# Patient Record
Sex: FEMALE | Race: BLACK OR AFRICAN AMERICAN | NOT HISPANIC OR LATINO | URBAN - METROPOLITAN AREA
[De-identification: names, ages, dates, MRNs, and addresses within clinical notes are randomized per-mention and may not be internally consistent; named-entity substitution may affect disease eponyms.]

---

## 2017-01-06 ENCOUNTER — OUTPATIENT (OUTPATIENT)
Dept: OUTPATIENT SERVICES | Facility: HOSPITAL | Age: 67
LOS: 1 days | End: 2017-01-06
Payer: COMMERCIAL

## 2017-01-06 ENCOUNTER — APPOINTMENT (OUTPATIENT)
Dept: ULTRASOUND IMAGING | Facility: CLINIC | Age: 67
End: 2017-01-06

## 2017-01-06 DIAGNOSIS — Z00.8 ENCOUNTER FOR OTHER GENERAL EXAMINATION: ICD-10-CM

## 2017-01-06 PROCEDURE — 76642 ULTRASOUND BREAST LIMITED: CPT

## 2017-12-05 ENCOUNTER — APPOINTMENT (OUTPATIENT)
Dept: ULTRASOUND IMAGING | Facility: CLINIC | Age: 67
End: 2017-12-05

## 2017-12-27 ENCOUNTER — OUTPATIENT (OUTPATIENT)
Dept: OUTPATIENT SERVICES | Facility: HOSPITAL | Age: 67
LOS: 1 days | End: 2017-12-27
Payer: MEDICARE

## 2017-12-27 ENCOUNTER — APPOINTMENT (OUTPATIENT)
Dept: MAMMOGRAPHY | Facility: CLINIC | Age: 67
End: 2017-12-27
Payer: MEDICARE

## 2017-12-27 DIAGNOSIS — Z00.8 ENCOUNTER FOR OTHER GENERAL EXAMINATION: ICD-10-CM

## 2017-12-27 PROCEDURE — G0202: CPT | Mod: 26

## 2017-12-27 PROCEDURE — 77063 BREAST TOMOSYNTHESIS BI: CPT

## 2017-12-27 PROCEDURE — 77063 BREAST TOMOSYNTHESIS BI: CPT | Mod: 26

## 2017-12-27 PROCEDURE — 77067 SCR MAMMO BI INCL CAD: CPT

## 2018-01-10 ENCOUNTER — APPOINTMENT (OUTPATIENT)
Dept: ULTRASOUND IMAGING | Facility: CLINIC | Age: 68
End: 2018-01-10
Payer: MEDICARE

## 2018-01-24 ENCOUNTER — OUTPATIENT (OUTPATIENT)
Dept: OUTPATIENT SERVICES | Facility: HOSPITAL | Age: 68
LOS: 1 days | End: 2018-01-24
Payer: MEDICARE

## 2018-01-24 ENCOUNTER — APPOINTMENT (OUTPATIENT)
Dept: ULTRASOUND IMAGING | Facility: CLINIC | Age: 68
End: 2018-01-24

## 2018-01-24 DIAGNOSIS — Z00.8 ENCOUNTER FOR OTHER GENERAL EXAMINATION: ICD-10-CM

## 2018-01-24 PROCEDURE — 76642 ULTRASOUND BREAST LIMITED: CPT | Mod: 26,RT

## 2018-01-24 PROCEDURE — 76642 ULTRASOUND BREAST LIMITED: CPT

## 2018-02-05 ENCOUNTER — RESULT REVIEW (OUTPATIENT)
Age: 68
End: 2018-02-05

## 2018-02-05 ENCOUNTER — OUTPATIENT (OUTPATIENT)
Dept: OUTPATIENT SERVICES | Facility: HOSPITAL | Age: 68
LOS: 1 days | End: 2018-02-05
Payer: MEDICARE

## 2018-02-05 ENCOUNTER — APPOINTMENT (OUTPATIENT)
Dept: ULTRASOUND IMAGING | Facility: CLINIC | Age: 68
End: 2018-02-05
Payer: MEDICARE

## 2018-02-05 DIAGNOSIS — Z00.8 ENCOUNTER FOR OTHER GENERAL EXAMINATION: ICD-10-CM

## 2018-02-05 PROCEDURE — 77065 DX MAMMO INCL CAD UNI: CPT

## 2018-02-05 PROCEDURE — 19083 BX BREAST 1ST LESION US IMAG: CPT | Mod: RT

## 2018-02-05 PROCEDURE — 19083 BX BREAST 1ST LESION US IMAG: CPT

## 2018-02-05 PROCEDURE — 77065 DX MAMMO INCL CAD UNI: CPT | Mod: 26,RT

## 2018-02-05 PROCEDURE — A4648: CPT

## 2018-03-05 ENCOUNTER — APPOINTMENT (OUTPATIENT)
Dept: SURGICAL ONCOLOGY | Facility: CLINIC | Age: 68
End: 2018-03-05
Payer: MEDICARE

## 2018-03-05 VITALS
BODY MASS INDEX: 29.89 KG/M2 | WEIGHT: 186 LBS | HEART RATE: 76 BPM | OXYGEN SATURATION: 98 % | DIASTOLIC BLOOD PRESSURE: 85 MMHG | HEIGHT: 66 IN | SYSTOLIC BLOOD PRESSURE: 137 MMHG

## 2018-03-05 DIAGNOSIS — Z80.3 FAMILY HISTORY OF MALIGNANT NEOPLASM OF BREAST: ICD-10-CM

## 2018-03-05 DIAGNOSIS — I34.1 NONRHEUMATIC MITRAL (VALVE) PROLAPSE: ICD-10-CM

## 2018-03-05 DIAGNOSIS — D64.9 ANEMIA, UNSPECIFIED: ICD-10-CM

## 2018-03-05 DIAGNOSIS — C50.919 MALIGNANT NEOPLASM OF UNSPECIFIED SITE OF UNSPECIFIED FEMALE BREAST: ICD-10-CM

## 2018-03-05 DIAGNOSIS — Z78.9 OTHER SPECIFIED HEALTH STATUS: ICD-10-CM

## 2018-03-05 PROCEDURE — 99205 OFFICE O/P NEW HI 60 MIN: CPT

## 2018-03-05 RX ORDER — ASPIRIN 81 MG/1
81 TABLET, DELAYED RELEASE ORAL
Refills: 0 | Status: ACTIVE | COMMUNITY

## 2019-01-01 ENCOUNTER — TRANSCRIPTION ENCOUNTER (OUTPATIENT)
Age: 69
End: 2019-01-01

## 2019-01-01 ENCOUNTER — APPOINTMENT (OUTPATIENT)
Dept: INTERVENTIONAL RADIOLOGY/VASCULAR | Facility: HOSPITAL | Age: 69
End: 2019-01-01

## 2019-01-01 ENCOUNTER — INPATIENT (INPATIENT)
Facility: HOSPITAL | Age: 69
LOS: 6 days | Discharge: HOME HEALTH | DRG: 981 | End: 2019-09-18
Attending: INTERNAL MEDICINE | Admitting: INTERNAL MEDICINE
Payer: MEDICARE

## 2019-01-01 VITALS
DIASTOLIC BLOOD PRESSURE: 61 MMHG | SYSTOLIC BLOOD PRESSURE: 112 MMHG | HEIGHT: 66 IN | WEIGHT: 139.99 LBS | TEMPERATURE: 98 F | HEART RATE: 94 BPM | RESPIRATION RATE: 16 BRPM | OXYGEN SATURATION: 100 %

## 2019-01-01 VITALS
HEART RATE: 67 BPM | OXYGEN SATURATION: 97 % | TEMPERATURE: 98 F | DIASTOLIC BLOOD PRESSURE: 52 MMHG | SYSTOLIC BLOOD PRESSURE: 93 MMHG | RESPIRATION RATE: 18 BRPM

## 2019-01-01 DIAGNOSIS — D64.9 ANEMIA, UNSPECIFIED: ICD-10-CM

## 2019-01-01 DIAGNOSIS — C50.911 MALIGNANT NEOPLASM OF UNSPECIFIED SITE OF RIGHT FEMALE BREAST: ICD-10-CM

## 2019-01-01 DIAGNOSIS — R19.5 OTHER FECAL ABNORMALITIES: ICD-10-CM

## 2019-01-01 DIAGNOSIS — A41.9 SEPSIS, UNSPECIFIED ORGANISM: ICD-10-CM

## 2019-01-01 DIAGNOSIS — C50.919 MALIGNANT NEOPLASM OF UNSPECIFIED SITE OF UNSPECIFIED FEMALE BREAST: ICD-10-CM

## 2019-01-01 DIAGNOSIS — Z98.890 OTHER SPECIFIED POSTPROCEDURAL STATES: Chronic | ICD-10-CM

## 2019-01-01 DIAGNOSIS — K92.2 GASTROINTESTINAL HEMORRHAGE, UNSPECIFIED: ICD-10-CM

## 2019-01-01 DIAGNOSIS — N64.1 FAT NECROSIS OF BREAST: ICD-10-CM

## 2019-01-01 DIAGNOSIS — I34.1 NONRHEUMATIC MITRAL (VALVE) PROLAPSE: ICD-10-CM

## 2019-01-01 DIAGNOSIS — D63.0 ANEMIA IN NEOPLASTIC DISEASE: ICD-10-CM

## 2019-01-01 DIAGNOSIS — D47.3 ESSENTIAL (HEMORRHAGIC) THROMBOCYTHEMIA: ICD-10-CM

## 2019-01-01 DIAGNOSIS — D50.0 IRON DEFICIENCY ANEMIA SECONDARY TO BLOOD LOSS (CHRONIC): ICD-10-CM

## 2019-01-01 DIAGNOSIS — Z91.89 OTHER SPECIFIED PERSONAL RISK FACTORS, NOT ELSEWHERE CLASSIFIED: ICD-10-CM

## 2019-01-01 DIAGNOSIS — E44.0 MODERATE PROTEIN-CALORIE MALNUTRITION: ICD-10-CM

## 2019-01-01 DIAGNOSIS — Z98.890 OTHER SPECIFIED POSTPROCEDURAL STATES: ICD-10-CM

## 2019-01-01 DIAGNOSIS — R63.8 OTHER SYMPTOMS AND SIGNS CONCERNING FOOD AND FLUID INTAKE: ICD-10-CM

## 2019-01-01 LAB
-  AMPICILLIN/SULBACTAM: SIGNIFICANT CHANGE UP
-  AMPICILLIN: SIGNIFICANT CHANGE UP
-  AZTREONAM: SIGNIFICANT CHANGE UP
-  CEFAZOLIN: SIGNIFICANT CHANGE UP
-  CEFAZOLIN: SIGNIFICANT CHANGE UP
-  CEFEPIME: SIGNIFICANT CHANGE UP
-  CEFTRIAXONE: SIGNIFICANT CHANGE UP
-  CIPROFLOXACIN: SIGNIFICANT CHANGE UP
-  CLINDAMYCIN: SIGNIFICANT CHANGE UP
-  ERYTHROMYCIN: SIGNIFICANT CHANGE UP
-  GENTAMICIN: SIGNIFICANT CHANGE UP
-  LEVOFLOXACIN: SIGNIFICANT CHANGE UP
-  LINEZOLID: SIGNIFICANT CHANGE UP
-  OXACILLIN: SIGNIFICANT CHANGE UP
-  PENICILLIN: SIGNIFICANT CHANGE UP
-  PIPERACILLIN/TAZOBACTAM: SIGNIFICANT CHANGE UP
-  PIPERACILLIN/TAZOBACTAM: SIGNIFICANT CHANGE UP
-  RIFAMPIN: SIGNIFICANT CHANGE UP
-  TOBRAMYCIN: SIGNIFICANT CHANGE UP
-  TRIMETHOPRIM/SULFAMETHOXAZOLE: SIGNIFICANT CHANGE UP
-  TRIMETHOPRIM/SULFAMETHOXAZOLE: SIGNIFICANT CHANGE UP
-  VANCOMYCIN: SIGNIFICANT CHANGE UP
ALBUMIN SERPL ELPH-MCNC: 2.7 G/DL — LOW (ref 3.3–5)
ALBUMIN SERPL ELPH-MCNC: 3 G/DL — LOW (ref 3.3–5)
ALP SERPL-CCNC: 67 U/L — SIGNIFICANT CHANGE UP (ref 40–120)
ALP SERPL-CCNC: 83 U/L — SIGNIFICANT CHANGE UP (ref 40–120)
ALT FLD-CCNC: 8 U/L — LOW (ref 10–45)
ALT FLD-CCNC: 9 U/L — LOW (ref 10–45)
ANION GAP SERPL CALC-SCNC: 10 MMOL/L — SIGNIFICANT CHANGE UP (ref 5–17)
ANION GAP SERPL CALC-SCNC: 10 MMOL/L — SIGNIFICANT CHANGE UP (ref 5–17)
ANION GAP SERPL CALC-SCNC: 11 MMOL/L — SIGNIFICANT CHANGE UP (ref 5–17)
ANION GAP SERPL CALC-SCNC: 7 MMOL/L — SIGNIFICANT CHANGE UP (ref 5–17)
ANION GAP SERPL CALC-SCNC: 8 MMOL/L — SIGNIFICANT CHANGE UP (ref 5–17)
ANION GAP SERPL CALC-SCNC: 9 MMOL/L — SIGNIFICANT CHANGE UP (ref 5–17)
ANISOCYTOSIS BLD QL: SIGNIFICANT CHANGE UP
APPEARANCE UR: CLEAR — SIGNIFICANT CHANGE UP
APTT BLD: 31.1 SEC — SIGNIFICANT CHANGE UP (ref 27.5–36.3)
APTT BLD: 31.5 SEC — SIGNIFICANT CHANGE UP (ref 27.5–36.3)
AST SERPL-CCNC: 24 U/L — SIGNIFICANT CHANGE UP (ref 10–40)
AST SERPL-CCNC: 26 U/L — SIGNIFICANT CHANGE UP (ref 10–40)
BASOPHILS # BLD AUTO: 0.02 K/UL — SIGNIFICANT CHANGE UP (ref 0–0.2)
BASOPHILS # BLD AUTO: 0.06 K/UL — SIGNIFICANT CHANGE UP (ref 0–0.2)
BASOPHILS # BLD AUTO: 0.08 K/UL — SIGNIFICANT CHANGE UP (ref 0–0.2)
BASOPHILS NFR BLD AUTO: 0.2 % — SIGNIFICANT CHANGE UP (ref 0–2)
BASOPHILS NFR BLD AUTO: 0.5 % — SIGNIFICANT CHANGE UP (ref 0–2)
BASOPHILS NFR BLD AUTO: 0.7 % — SIGNIFICANT CHANGE UP (ref 0–2)
BILIRUB SERPL-MCNC: 0.2 MG/DL — SIGNIFICANT CHANGE UP (ref 0.2–1.2)
BILIRUB SERPL-MCNC: 0.2 MG/DL — SIGNIFICANT CHANGE UP (ref 0.2–1.2)
BILIRUB UR-MCNC: NEGATIVE — SIGNIFICANT CHANGE UP
BLD GP AB SCN SERPL QL: NEGATIVE — SIGNIFICANT CHANGE UP
BUN SERPL-MCNC: 10 MG/DL — SIGNIFICANT CHANGE UP (ref 7–23)
BUN SERPL-MCNC: 10 MG/DL — SIGNIFICANT CHANGE UP (ref 7–23)
BUN SERPL-MCNC: 11 MG/DL — SIGNIFICANT CHANGE UP (ref 7–23)
BUN SERPL-MCNC: 9 MG/DL — SIGNIFICANT CHANGE UP (ref 7–23)
BURR CELLS BLD QL SMEAR: PRESENT — SIGNIFICANT CHANGE UP
BURR CELLS BLD QL SMEAR: SLIGHT — SIGNIFICANT CHANGE UP
CALCIUM SERPL-MCNC: 8.1 MG/DL — LOW (ref 8.4–10.5)
CALCIUM SERPL-MCNC: 8.3 MG/DL — LOW (ref 8.4–10.5)
CALCIUM SERPL-MCNC: 8.4 MG/DL — SIGNIFICANT CHANGE UP (ref 8.4–10.5)
CALCIUM SERPL-MCNC: 8.7 MG/DL — SIGNIFICANT CHANGE UP (ref 8.4–10.5)
CALCIUM SERPL-MCNC: 8.8 MG/DL — SIGNIFICANT CHANGE UP (ref 8.4–10.5)
CALCIUM SERPL-MCNC: 8.8 MG/DL — SIGNIFICANT CHANGE UP (ref 8.4–10.5)
CANCER AG27-29 SERPL-ACNC: 13 U/ML — SIGNIFICANT CHANGE UP (ref 0–38.6)
CEA SERPL-MCNC: 2.4 NG/ML — SIGNIFICANT CHANGE UP (ref 0–3.8)
CHLORIDE SERPL-SCNC: 104 MMOL/L — SIGNIFICANT CHANGE UP (ref 96–108)
CHLORIDE SERPL-SCNC: 105 MMOL/L — SIGNIFICANT CHANGE UP (ref 96–108)
CHLORIDE SERPL-SCNC: 106 MMOL/L — SIGNIFICANT CHANGE UP (ref 96–108)
CHLORIDE SERPL-SCNC: 106 MMOL/L — SIGNIFICANT CHANGE UP (ref 96–108)
CHLORIDE SERPL-SCNC: 107 MMOL/L — SIGNIFICANT CHANGE UP (ref 96–108)
CHLORIDE SERPL-SCNC: 107 MMOL/L — SIGNIFICANT CHANGE UP (ref 96–108)
CHLORIDE SERPL-SCNC: 108 MMOL/L — SIGNIFICANT CHANGE UP (ref 96–108)
CHLORIDE SERPL-SCNC: 108 MMOL/L — SIGNIFICANT CHANGE UP (ref 96–108)
CO2 SERPL-SCNC: 21 MMOL/L — LOW (ref 22–31)
CO2 SERPL-SCNC: 22 MMOL/L — SIGNIFICANT CHANGE UP (ref 22–31)
CO2 SERPL-SCNC: 22 MMOL/L — SIGNIFICANT CHANGE UP (ref 22–31)
CO2 SERPL-SCNC: 23 MMOL/L — SIGNIFICANT CHANGE UP (ref 22–31)
CO2 SERPL-SCNC: 24 MMOL/L — SIGNIFICANT CHANGE UP (ref 22–31)
COLOR SPEC: YELLOW — SIGNIFICANT CHANGE UP
CREAT SERPL-MCNC: 0.83 MG/DL — SIGNIFICANT CHANGE UP (ref 0.5–1.3)
CREAT SERPL-MCNC: 0.86 MG/DL — SIGNIFICANT CHANGE UP (ref 0.5–1.3)
CREAT SERPL-MCNC: 0.88 MG/DL — SIGNIFICANT CHANGE UP (ref 0.5–1.3)
CREAT SERPL-MCNC: 0.88 MG/DL — SIGNIFICANT CHANGE UP (ref 0.5–1.3)
CREAT SERPL-MCNC: 0.89 MG/DL — SIGNIFICANT CHANGE UP (ref 0.5–1.3)
CREAT SERPL-MCNC: 0.91 MG/DL — SIGNIFICANT CHANGE UP (ref 0.5–1.3)
CREAT SERPL-MCNC: 0.91 MG/DL — SIGNIFICANT CHANGE UP (ref 0.5–1.3)
CREAT SERPL-MCNC: 0.97 MG/DL — SIGNIFICANT CHANGE UP (ref 0.5–1.3)
CULTURE RESULTS: NO GROWTH — SIGNIFICANT CHANGE UP
CULTURE RESULTS: SIGNIFICANT CHANGE UP
DACRYOCYTES BLD QL SMEAR: SLIGHT — SIGNIFICANT CHANGE UP
DIFF PNL FLD: NEGATIVE — SIGNIFICANT CHANGE UP
EOSINOPHIL # BLD AUTO: 0.32 K/UL — SIGNIFICANT CHANGE UP (ref 0–0.5)
EOSINOPHIL # BLD AUTO: 0.39 K/UL — SIGNIFICANT CHANGE UP (ref 0–0.5)
EOSINOPHIL # BLD AUTO: 0.67 K/UL — HIGH (ref 0–0.5)
EOSINOPHIL NFR BLD AUTO: 2.9 % — SIGNIFICANT CHANGE UP (ref 0–6)
EOSINOPHIL NFR BLD AUTO: 3 % — SIGNIFICANT CHANGE UP (ref 0–6)
EOSINOPHIL NFR BLD AUTO: 6 % — SIGNIFICANT CHANGE UP (ref 0–6)
FERRITIN SERPL-MCNC: 67 NG/ML — SIGNIFICANT CHANGE UP (ref 15–150)
FOLATE SERPL-MCNC: 14.7 NG/ML — SIGNIFICANT CHANGE UP
GLUCOSE BLDC GLUCOMTR-MCNC: 107 MG/DL — HIGH (ref 70–99)
GLUCOSE SERPL-MCNC: 100 MG/DL — HIGH (ref 70–99)
GLUCOSE SERPL-MCNC: 103 MG/DL — HIGH (ref 70–99)
GLUCOSE SERPL-MCNC: 105 MG/DL — HIGH (ref 70–99)
GLUCOSE SERPL-MCNC: 105 MG/DL — HIGH (ref 70–99)
GLUCOSE SERPL-MCNC: 112 MG/DL — HIGH (ref 70–99)
GLUCOSE SERPL-MCNC: 112 MG/DL — HIGH (ref 70–99)
GLUCOSE SERPL-MCNC: 91 MG/DL — SIGNIFICANT CHANGE UP (ref 70–99)
GLUCOSE SERPL-MCNC: 95 MG/DL — SIGNIFICANT CHANGE UP (ref 70–99)
GLUCOSE UR QL: NEGATIVE — SIGNIFICANT CHANGE UP
GRAM STN FLD: SIGNIFICANT CHANGE UP
HAPTOGLOB SERPL-MCNC: 248 MG/DL — HIGH (ref 34–200)
HCT VFR BLD CALC: 12 % — CRITICAL LOW (ref 34.5–45)
HCT VFR BLD CALC: 18.2 % — CRITICAL LOW (ref 34.5–45)
HCT VFR BLD CALC: 18.5 % — CRITICAL LOW (ref 34.5–45)
HCT VFR BLD CALC: 20.8 % — CRITICAL LOW (ref 34.5–45)
HCT VFR BLD CALC: 23.3 % — LOW (ref 34.5–45)
HCT VFR BLD CALC: 23.6 % — LOW (ref 34.5–45)
HCT VFR BLD CALC: 24.8 % — LOW (ref 34.5–45)
HCT VFR BLD CALC: 25 % — LOW (ref 34.5–45)
HCT VFR BLD CALC: 25.6 % — LOW (ref 34.5–45)
HCT VFR BLD CALC: 25.7 % — LOW (ref 34.5–45)
HCT VFR BLD CALC: 25.9 % — LOW (ref 34.5–45)
HCV AB S/CO SERPL IA: 0.07 S/CO — SIGNIFICANT CHANGE UP
HCV AB SERPL-IMP: SIGNIFICANT CHANGE UP
HGB BLD-MCNC: 3.6 G/DL — CRITICAL LOW (ref 11.5–15.5)
HGB BLD-MCNC: 5.6 G/DL — CRITICAL LOW (ref 11.5–15.5)
HGB BLD-MCNC: 5.8 G/DL — CRITICAL LOW (ref 11.5–15.5)
HGB BLD-MCNC: 6.3 G/DL — CRITICAL LOW (ref 11.5–15.5)
HGB BLD-MCNC: 7.2 G/DL — LOW (ref 11.5–15.5)
HGB BLD-MCNC: 7.4 G/DL — LOW (ref 11.5–15.5)
HGB BLD-MCNC: 7.6 G/DL — LOW (ref 11.5–15.5)
HGB BLD-MCNC: 7.8 G/DL — LOW (ref 11.5–15.5)
HGB BLD-MCNC: 7.8 G/DL — LOW (ref 11.5–15.5)
HGB BLD-MCNC: 7.9 G/DL — LOW (ref 11.5–15.5)
HGB BLD-MCNC: 8 G/DL — LOW (ref 11.5–15.5)
HYPOCHROMIA BLD QL: SIGNIFICANT CHANGE UP
IMM GRANULOCYTES NFR BLD AUTO: 0.3 % — SIGNIFICANT CHANGE UP (ref 0–1.5)
IMM GRANULOCYTES NFR BLD AUTO: 0.5 % — SIGNIFICANT CHANGE UP (ref 0–1.5)
IMM GRANULOCYTES NFR BLD AUTO: 0.5 % — SIGNIFICANT CHANGE UP (ref 0–1.5)
INR BLD: 1.19 — HIGH (ref 0.88–1.16)
INR BLD: 1.22 — HIGH (ref 0.88–1.16)
IRON SATN MFR SERPL: 19 UG/DL — LOW (ref 30–160)
IRON SATN MFR SERPL: 8 % — LOW (ref 14–50)
KETONES UR-MCNC: NEGATIVE — SIGNIFICANT CHANGE UP
LDH SERPL L TO P-CCNC: 548 U/L — HIGH (ref 50–242)
LEUKOCYTE ESTERASE UR-ACNC: NEGATIVE — SIGNIFICANT CHANGE UP
LYMPHOCYTES # BLD AUTO: 1.65 K/UL — SIGNIFICANT CHANGE UP (ref 1–3.3)
LYMPHOCYTES # BLD AUTO: 1.72 K/UL — SIGNIFICANT CHANGE UP (ref 1–3.3)
LYMPHOCYTES # BLD AUTO: 1.74 K/UL — SIGNIFICANT CHANGE UP (ref 1–3.3)
LYMPHOCYTES # BLD AUTO: 13.1 % — SIGNIFICANT CHANGE UP (ref 13–44)
LYMPHOCYTES # BLD AUTO: 14.9 % — SIGNIFICANT CHANGE UP (ref 13–44)
LYMPHOCYTES # BLD AUTO: 15.8 % — SIGNIFICANT CHANGE UP (ref 13–44)
MACROCYTES BLD QL: SLIGHT — SIGNIFICANT CHANGE UP
MAGNESIUM SERPL-MCNC: 1.9 MG/DL — SIGNIFICANT CHANGE UP (ref 1.6–2.6)
MAGNESIUM SERPL-MCNC: 2 MG/DL — SIGNIFICANT CHANGE UP (ref 1.6–2.6)
MAGNESIUM SERPL-MCNC: 2 MG/DL — SIGNIFICANT CHANGE UP (ref 1.6–2.6)
MAGNESIUM SERPL-MCNC: 2.1 MG/DL — SIGNIFICANT CHANGE UP (ref 1.6–2.6)
MANUAL SMEAR VERIFICATION: SIGNIFICANT CHANGE UP
MCHC RBC-ENTMCNC: 19.4 PG — LOW (ref 27–34)
MCHC RBC-ENTMCNC: 23 PG — LOW (ref 27–34)
MCHC RBC-ENTMCNC: 23.1 PG — LOW (ref 27–34)
MCHC RBC-ENTMCNC: 23.4 PG — LOW (ref 27–34)
MCHC RBC-ENTMCNC: 24.1 PG — LOW (ref 27–34)
MCHC RBC-ENTMCNC: 24.2 PG — LOW (ref 27–34)
MCHC RBC-ENTMCNC: 24.5 PG — LOW (ref 27–34)
MCHC RBC-ENTMCNC: 24.5 PG — LOW (ref 27–34)
MCHC RBC-ENTMCNC: 24.8 PG — LOW (ref 27–34)
MCHC RBC-ENTMCNC: 24.8 PG — LOW (ref 27–34)
MCHC RBC-ENTMCNC: 24.9 PG — LOW (ref 27–34)
MCHC RBC-ENTMCNC: 29.6 GM/DL — LOW (ref 32–36)
MCHC RBC-ENTMCNC: 30 GM/DL — LOW (ref 32–36)
MCHC RBC-ENTMCNC: 30.3 GM/DL — LOW (ref 32–36)
MCHC RBC-ENTMCNC: 30.3 GM/DL — LOW (ref 32–36)
MCHC RBC-ENTMCNC: 30.5 GM/DL — LOW (ref 32–36)
MCHC RBC-ENTMCNC: 30.5 GM/DL — LOW (ref 32–36)
MCHC RBC-ENTMCNC: 30.9 GM/DL — LOW (ref 32–36)
MCHC RBC-ENTMCNC: 31.2 GM/DL — LOW (ref 32–36)
MCHC RBC-ENTMCNC: 31.8 GM/DL — LOW (ref 32–36)
MCHC RBC-ENTMCNC: 31.9 GM/DL — LOW (ref 32–36)
MCHC RBC-ENTMCNC: 31.9 GM/DL — LOW (ref 32–36)
MCV RBC AUTO: 64.5 FL — LOW (ref 80–100)
MCV RBC AUTO: 73.4 FL — LOW (ref 80–100)
MCV RBC AUTO: 75.9 FL — LOW (ref 80–100)
MCV RBC AUTO: 76.4 FL — LOW (ref 80–100)
MCV RBC AUTO: 78 FL — LOW (ref 80–100)
MCV RBC AUTO: 78.5 FL — LOW (ref 80–100)
MCV RBC AUTO: 79.4 FL — LOW (ref 80–100)
MCV RBC AUTO: 79.5 FL — LOW (ref 80–100)
MCV RBC AUTO: 79.6 FL — LOW (ref 80–100)
MCV RBC AUTO: 80.3 FL — SIGNIFICANT CHANGE UP (ref 80–100)
MCV RBC AUTO: 81.3 FL — SIGNIFICANT CHANGE UP (ref 80–100)
METHOD TYPE: SIGNIFICANT CHANGE UP
MICROCYTES BLD QL: SIGNIFICANT CHANGE UP
MONOCYTES # BLD AUTO: 0.83 K/UL — SIGNIFICANT CHANGE UP (ref 0–0.9)
MONOCYTES # BLD AUTO: 1.02 K/UL — HIGH (ref 0–0.9)
MONOCYTES # BLD AUTO: 1.05 K/UL — HIGH (ref 0–0.9)
MONOCYTES NFR BLD AUTO: 6.3 % — SIGNIFICANT CHANGE UP (ref 2–14)
MONOCYTES NFR BLD AUTO: 9.2 % — SIGNIFICANT CHANGE UP (ref 2–14)
MONOCYTES NFR BLD AUTO: 9.5 % — SIGNIFICANT CHANGE UP (ref 2–14)
NEUTROPHILS # BLD AUTO: 10.09 K/UL — HIGH (ref 1.8–7.4)
NEUTROPHILS # BLD AUTO: 7.62 K/UL — HIGH (ref 1.8–7.4)
NEUTROPHILS # BLD AUTO: 7.84 K/UL — HIGH (ref 1.8–7.4)
NEUTROPHILS NFR BLD AUTO: 68.7 % — SIGNIFICANT CHANGE UP (ref 43–77)
NEUTROPHILS NFR BLD AUTO: 71.1 % — SIGNIFICANT CHANGE UP (ref 43–77)
NEUTROPHILS NFR BLD AUTO: 76.8 % — SIGNIFICANT CHANGE UP (ref 43–77)
NITRITE UR-MCNC: NEGATIVE — SIGNIFICANT CHANGE UP
NRBC # BLD: 0 /100 WBCS — SIGNIFICANT CHANGE UP (ref 0–0)
ORGANISM # SPEC MICROSCOPIC CNT: SIGNIFICANT CHANGE UP
OVALOCYTES BLD QL SMEAR: SLIGHT — SIGNIFICANT CHANGE UP
PH UR: 8 — SIGNIFICANT CHANGE UP (ref 5–8)
PLAT MORPH BLD: NORMAL — SIGNIFICANT CHANGE UP
PLATELET # BLD AUTO: 408 K/UL — HIGH (ref 150–400)
PLATELET # BLD AUTO: 414 K/UL — HIGH (ref 150–400)
PLATELET # BLD AUTO: 433 K/UL — HIGH (ref 150–400)
PLATELET # BLD AUTO: 434 K/UL — HIGH (ref 150–400)
PLATELET # BLD AUTO: 435 K/UL — HIGH (ref 150–400)
PLATELET # BLD AUTO: 439 K/UL — HIGH (ref 150–400)
PLATELET # BLD AUTO: 455 K/UL — HIGH (ref 150–400)
PLATELET # BLD AUTO: 464 K/UL — HIGH (ref 150–400)
PLATELET # BLD AUTO: 467 K/UL — HIGH (ref 150–400)
PLATELET # BLD AUTO: 475 K/UL — HIGH (ref 150–400)
PLATELET # BLD AUTO: 567 K/UL — HIGH (ref 150–400)
POLYCHROMASIA BLD QL SMEAR: SLIGHT — SIGNIFICANT CHANGE UP
POTASSIUM SERPL-MCNC: 3.7 MMOL/L — SIGNIFICANT CHANGE UP (ref 3.5–5.3)
POTASSIUM SERPL-MCNC: 3.8 MMOL/L — SIGNIFICANT CHANGE UP (ref 3.5–5.3)
POTASSIUM SERPL-MCNC: 4 MMOL/L — SIGNIFICANT CHANGE UP (ref 3.5–5.3)
POTASSIUM SERPL-MCNC: 4 MMOL/L — SIGNIFICANT CHANGE UP (ref 3.5–5.3)
POTASSIUM SERPL-MCNC: 4.1 MMOL/L — SIGNIFICANT CHANGE UP (ref 3.5–5.3)
POTASSIUM SERPL-MCNC: 4.2 MMOL/L — SIGNIFICANT CHANGE UP (ref 3.5–5.3)
POTASSIUM SERPL-MCNC: 4.2 MMOL/L — SIGNIFICANT CHANGE UP (ref 3.5–5.3)
POTASSIUM SERPL-MCNC: 4.4 MMOL/L — SIGNIFICANT CHANGE UP (ref 3.5–5.3)
POTASSIUM SERPL-SCNC: 3.7 MMOL/L — SIGNIFICANT CHANGE UP (ref 3.5–5.3)
POTASSIUM SERPL-SCNC: 3.8 MMOL/L — SIGNIFICANT CHANGE UP (ref 3.5–5.3)
POTASSIUM SERPL-SCNC: 4 MMOL/L — SIGNIFICANT CHANGE UP (ref 3.5–5.3)
POTASSIUM SERPL-SCNC: 4 MMOL/L — SIGNIFICANT CHANGE UP (ref 3.5–5.3)
POTASSIUM SERPL-SCNC: 4.1 MMOL/L — SIGNIFICANT CHANGE UP (ref 3.5–5.3)
POTASSIUM SERPL-SCNC: 4.2 MMOL/L — SIGNIFICANT CHANGE UP (ref 3.5–5.3)
POTASSIUM SERPL-SCNC: 4.2 MMOL/L — SIGNIFICANT CHANGE UP (ref 3.5–5.3)
POTASSIUM SERPL-SCNC: 4.4 MMOL/L — SIGNIFICANT CHANGE UP (ref 3.5–5.3)
PROT SERPL-MCNC: 5.9 G/DL — LOW (ref 6–8.3)
PROT SERPL-MCNC: 6.1 G/DL — SIGNIFICANT CHANGE UP (ref 6–8.3)
PROT UR-MCNC: NEGATIVE MG/DL — SIGNIFICANT CHANGE UP
PROTHROM AB SERPL-ACNC: 13.5 SEC — HIGH (ref 10–12.9)
PROTHROM AB SERPL-ACNC: 13.9 SEC — HIGH (ref 10–12.9)
RBC # BLD: 1.86 M/UL — LOW (ref 3.8–5.2)
RBC # BLD: 2.42 M/UL — LOW (ref 3.8–5.2)
RBC # BLD: 2.48 M/UL — LOW (ref 3.8–5.2)
RBC # BLD: 2.74 M/UL — LOW (ref 3.8–5.2)
RBC # BLD: 2.79 M/UL — LOW (ref 3.8–5.2)
RBC # BLD: 2.97 M/UL — LOW (ref 3.8–5.2)
RBC # BLD: 2.97 M/UL — LOW (ref 3.8–5.2)
RBC # BLD: 3.14 M/UL — LOW (ref 3.8–5.2)
RBC # BLD: 3.16 M/UL — LOW (ref 3.8–5.2)
RBC # BLD: 3.18 M/UL — LOW (ref 3.8–5.2)
RBC # BLD: 3.19 M/UL — LOW (ref 3.8–5.2)
RBC # BLD: 3.26 M/UL — LOW (ref 3.8–5.2)
RBC # FLD: 18.4 % — HIGH (ref 10.3–14.5)
RBC # FLD: 25 % — HIGH (ref 10.3–14.5)
RBC # FLD: 25 % — HIGH (ref 10.3–14.5)
RBC # FLD: 25.1 % — HIGH (ref 10.3–14.5)
RBC # FLD: 25.6 % — HIGH (ref 10.3–14.5)
RBC # FLD: 26.1 % — HIGH (ref 10.3–14.5)
RBC # FLD: SIGNIFICANT CHANGE UP (ref 10.3–14.5)
RBC BLD AUTO: ABNORMAL
RETICS #: 60.3 K/UL — SIGNIFICANT CHANGE UP (ref 25–125)
RETICS/RBC NFR: 2.2 % — SIGNIFICANT CHANGE UP (ref 0.5–2.5)
RH IG SCN BLD-IMP: POSITIVE — SIGNIFICANT CHANGE UP
SODIUM SERPL-SCNC: 135 MMOL/L — SIGNIFICANT CHANGE UP (ref 135–145)
SODIUM SERPL-SCNC: 136 MMOL/L — SIGNIFICANT CHANGE UP (ref 135–145)
SODIUM SERPL-SCNC: 138 MMOL/L — SIGNIFICANT CHANGE UP (ref 135–145)
SODIUM SERPL-SCNC: 138 MMOL/L — SIGNIFICANT CHANGE UP (ref 135–145)
SODIUM SERPL-SCNC: 139 MMOL/L — SIGNIFICANT CHANGE UP (ref 135–145)
SP GR SPEC: 1.01 — SIGNIFICANT CHANGE UP (ref 1–1.03)
SPECIMEN SOURCE: SIGNIFICANT CHANGE UP
TARGETS BLD QL SMEAR: SLIGHT — SIGNIFICANT CHANGE UP
TIBC SERPL-MCNC: 248 UG/DL — SIGNIFICANT CHANGE UP (ref 220–430)
UIBC SERPL-MCNC: 229 UG/DL — SIGNIFICANT CHANGE UP (ref 110–370)
UROBILINOGEN FLD QL: 0.2 E.U./DL — SIGNIFICANT CHANGE UP
VANCOMYCIN TROUGH SERPL-MCNC: 12.8 UG/ML — SIGNIFICANT CHANGE UP (ref 10–20)
VIT B12 SERPL-MCNC: 866 PG/ML — SIGNIFICANT CHANGE UP (ref 232–1245)
WBC # BLD: 10.04 K/UL — SIGNIFICANT CHANGE UP (ref 3.8–10.5)
WBC # BLD: 10.36 K/UL — SIGNIFICANT CHANGE UP (ref 3.8–10.5)
WBC # BLD: 10.79 K/UL — HIGH (ref 3.8–10.5)
WBC # BLD: 11.02 K/UL — HIGH (ref 3.8–10.5)
WBC # BLD: 11.09 K/UL — HIGH (ref 3.8–10.5)
WBC # BLD: 13.13 K/UL — HIGH (ref 3.8–10.5)
WBC # BLD: 13.44 K/UL — HIGH (ref 3.8–10.5)
WBC # BLD: 13.6 K/UL — HIGH (ref 3.8–10.5)
WBC # BLD: 14.29 K/UL — HIGH (ref 3.8–10.5)
WBC # BLD: 9.06 K/UL — SIGNIFICANT CHANGE UP (ref 3.8–10.5)
WBC # BLD: 9.78 K/UL — SIGNIFICANT CHANGE UP (ref 3.8–10.5)
WBC # FLD AUTO: 10.04 K/UL — SIGNIFICANT CHANGE UP (ref 3.8–10.5)
WBC # FLD AUTO: 10.36 K/UL — SIGNIFICANT CHANGE UP (ref 3.8–10.5)
WBC # FLD AUTO: 10.79 K/UL — HIGH (ref 3.8–10.5)
WBC # FLD AUTO: 11.02 K/UL — HIGH (ref 3.8–10.5)
WBC # FLD AUTO: 11.09 K/UL — HIGH (ref 3.8–10.5)
WBC # FLD AUTO: 13.13 K/UL — HIGH (ref 3.8–10.5)
WBC # FLD AUTO: 13.44 K/UL — HIGH (ref 3.8–10.5)
WBC # FLD AUTO: 13.6 K/UL — HIGH (ref 3.8–10.5)
WBC # FLD AUTO: 14.29 K/UL — HIGH (ref 3.8–10.5)
WBC # FLD AUTO: 9.06 K/UL — SIGNIFICANT CHANGE UP (ref 3.8–10.5)
WBC # FLD AUTO: 9.78 K/UL — SIGNIFICANT CHANGE UP (ref 3.8–10.5)

## 2019-01-01 PROCEDURE — 71275 CT ANGIOGRAPHY CHEST: CPT

## 2019-01-01 PROCEDURE — 80053 COMPREHEN METABOLIC PANEL: CPT

## 2019-01-01 PROCEDURE — C1889: CPT

## 2019-01-01 PROCEDURE — 86803 HEPATITIS C AB TEST: CPT

## 2019-01-01 PROCEDURE — 36245 INS CATH ABD/L-EXT ART 1ST: CPT

## 2019-01-01 PROCEDURE — 99223 1ST HOSP IP/OBS HIGH 75: CPT | Mod: GC

## 2019-01-01 PROCEDURE — 86901 BLOOD TYPING SEROLOGIC RH(D): CPT

## 2019-01-01 PROCEDURE — 93010 ELECTROCARDIOGRAM REPORT: CPT

## 2019-01-01 PROCEDURE — 75710 ARTERY X-RAYS ARM/LEG: CPT | Mod: 26

## 2019-01-01 PROCEDURE — 75774 ARTERY X-RAY EACH VESSEL: CPT | Mod: 26

## 2019-01-01 PROCEDURE — 86922 COMPATIBILITY TEST ANTIGLOB: CPT

## 2019-01-01 PROCEDURE — 71045 X-RAY EXAM CHEST 1 VIEW: CPT | Mod: 26

## 2019-01-01 PROCEDURE — 85027 COMPLETE CBC AUTOMATED: CPT

## 2019-01-01 PROCEDURE — A9552: CPT

## 2019-01-01 PROCEDURE — 75625 CONTRAST EXAM ABDOMINL AORTA: CPT | Mod: 26

## 2019-01-01 PROCEDURE — 86300 IMMUNOASSAY TUMOR CA 15-3: CPT

## 2019-01-01 PROCEDURE — 75726 ARTERY X-RAYS ABDOMEN: CPT | Mod: 26,59

## 2019-01-01 PROCEDURE — 86923 COMPATIBILITY TEST ELECTRIC: CPT

## 2019-01-01 PROCEDURE — 80048 BASIC METABOLIC PNL TOTAL CA: CPT

## 2019-01-01 PROCEDURE — 83550 IRON BINDING TEST: CPT

## 2019-01-01 PROCEDURE — 71275 CT ANGIOGRAPHY CHEST: CPT | Mod: 26

## 2019-01-01 PROCEDURE — 85045 AUTOMATED RETICULOCYTE COUNT: CPT

## 2019-01-01 PROCEDURE — 86900 BLOOD TYPING SEROLOGIC ABO: CPT

## 2019-01-01 PROCEDURE — 81003 URINALYSIS AUTO W/O SCOPE: CPT

## 2019-01-01 PROCEDURE — 83615 LACTATE (LD) (LDH) ENZYME: CPT

## 2019-01-01 PROCEDURE — 82607 VITAMIN B-12: CPT

## 2019-01-01 PROCEDURE — 82746 ASSAY OF FOLIC ACID SERUM: CPT

## 2019-01-01 PROCEDURE — 87086 URINE CULTURE/COLONY COUNT: CPT

## 2019-01-01 PROCEDURE — 83010 ASSAY OF HAPTOGLOBIN QUANT: CPT

## 2019-01-01 PROCEDURE — P9016: CPT

## 2019-01-01 PROCEDURE — 82728 ASSAY OF FERRITIN: CPT

## 2019-01-01 PROCEDURE — 97161 PT EVAL LOW COMPLEX 20 MIN: CPT

## 2019-01-01 PROCEDURE — 36216 PLACE CATHETER IN ARTERY: CPT

## 2019-01-01 PROCEDURE — 87186 SC STD MICRODIL/AGAR DIL: CPT

## 2019-01-01 PROCEDURE — 78815 PET IMAGE W/CT SKULL-THIGH: CPT | Mod: 26

## 2019-01-01 PROCEDURE — 85610 PROTHROMBIN TIME: CPT

## 2019-01-01 PROCEDURE — 87184 SC STD DISK METHOD PER PLATE: CPT

## 2019-01-01 PROCEDURE — 36415 COLL VENOUS BLD VENIPUNCTURE: CPT

## 2019-01-01 PROCEDURE — 36430 TRANSFUSION BLD/BLD COMPNT: CPT

## 2019-01-01 PROCEDURE — 84484 ASSAY OF TROPONIN QUANT: CPT

## 2019-01-01 PROCEDURE — C1894: CPT

## 2019-01-01 PROCEDURE — 86921 COMPATIBILITY TEST INCUBATE: CPT

## 2019-01-01 PROCEDURE — 85730 THROMBOPLASTIN TIME PARTIAL: CPT

## 2019-01-01 PROCEDURE — 36218 PLACE CATHETER IN ARTERY: CPT

## 2019-01-01 PROCEDURE — 99285 EMERGENCY DEPT VISIT HI MDM: CPT | Mod: 25

## 2019-01-01 PROCEDURE — 99285 EMERGENCY DEPT VISIT HI MDM: CPT

## 2019-01-01 PROCEDURE — 37243 VASC EMBOLIZE/OCCLUDE ORGAN: CPT | Mod: XS

## 2019-01-01 PROCEDURE — 83735 ASSAY OF MAGNESIUM: CPT

## 2019-01-01 PROCEDURE — 82378 CARCINOEMBRYONIC ANTIGEN: CPT

## 2019-01-01 PROCEDURE — 83540 ASSAY OF IRON: CPT

## 2019-01-01 PROCEDURE — 85025 COMPLETE CBC W/AUTO DIFF WBC: CPT

## 2019-01-01 PROCEDURE — 80202 ASSAY OF VANCOMYCIN: CPT

## 2019-01-01 PROCEDURE — 86850 RBC ANTIBODY SCREEN: CPT

## 2019-01-01 PROCEDURE — C1769: CPT

## 2019-01-01 PROCEDURE — 87070 CULTURE OTHR SPECIMN AEROBIC: CPT

## 2019-01-01 PROCEDURE — 82962 GLUCOSE BLOOD TEST: CPT

## 2019-01-01 PROCEDURE — 78815 PET IMAGE W/CT SKULL-THIGH: CPT

## 2019-01-01 PROCEDURE — 87040 BLOOD CULTURE FOR BACTERIA: CPT

## 2019-01-01 PROCEDURE — 71045 X-RAY EXAM CHEST 1 VIEW: CPT

## 2019-01-01 PROCEDURE — C1887: CPT

## 2019-01-01 PROCEDURE — 86078 PHYS BLOOD BANK SERV REACTJ: CPT

## 2019-01-01 PROCEDURE — 93005 ELECTROCARDIOGRAM TRACING: CPT

## 2019-01-01 RX ORDER — ONDANSETRON 8 MG/1
16 TABLET, FILM COATED ORAL ONCE
Refills: 0 | Status: DISCONTINUED | OUTPATIENT
Start: 2019-01-01 | End: 2019-01-01

## 2019-01-01 RX ORDER — POLYETHYLENE GLYCOL 3350 17 G/17G
17 POWDER, FOR SOLUTION ORAL DAILY
Refills: 0 | Status: DISCONTINUED | OUTPATIENT
Start: 2019-01-01 | End: 2019-01-01

## 2019-01-01 RX ORDER — ENOXAPARIN SODIUM 100 MG/ML
40 INJECTION SUBCUTANEOUS DAILY
Refills: 0 | Status: DISCONTINUED | OUTPATIENT
Start: 2019-01-01 | End: 2019-01-01

## 2019-01-01 RX ORDER — MITOXANTRONE 2 MG/ML
5 INJECTION, SOLUTION, CONCENTRATE INTRAVENOUS
Qty: 0 | Refills: 0 | DISCHARGE
Start: 2019-01-01

## 2019-01-01 RX ORDER — BACITRACIN ZINC NEOMYCIN SULFATE POLYMYXIN B SULFATE 400; 3.5; 5 [IU]/G; MG/G; [IU]/G
1 OINTMENT TOPICAL EVERY 8 HOURS
Refills: 0 | Status: DISCONTINUED | OUTPATIENT
Start: 2019-01-01 | End: 2019-01-01

## 2019-01-01 RX ORDER — GEMCITABINE 38 MG/ML
0 INJECTION, SOLUTION INTRAVENOUS
Qty: 0 | Refills: 0 | DISCHARGE
Start: 2019-01-01

## 2019-01-01 RX ORDER — SODIUM CHLORIDE 9 MG/ML
1000 INJECTION INTRAMUSCULAR; INTRAVENOUS; SUBCUTANEOUS
Refills: 0 | Status: DISCONTINUED | OUTPATIENT
Start: 2019-01-01 | End: 2019-01-01

## 2019-01-01 RX ORDER — OXALIPLATIN 5 MG/ML
0 INJECTION, SOLUTION INTRAVENOUS
Qty: 0 | Refills: 0 | DISCHARGE
Start: 2019-01-01

## 2019-01-01 RX ORDER — METRONIDAZOLE 500 MG
500 TABLET ORAL EVERY 8 HOURS
Refills: 0 | Status: DISCONTINUED | OUTPATIENT
Start: 2019-01-01 | End: 2019-01-01

## 2019-01-01 RX ORDER — ACETAMINOPHEN 500 MG
325 TABLET ORAL EVERY 4 HOURS
Refills: 0 | Status: DISCONTINUED | OUTPATIENT
Start: 2019-01-01 | End: 2019-01-01

## 2019-01-01 RX ORDER — METRONIDAZOLE 500 MG
1 TABLET ORAL
Qty: 21 | Refills: 0
Start: 2019-01-01 | End: 2019-01-01

## 2019-01-01 RX ORDER — MAGNESIUM SULFATE 500 MG/ML
1 VIAL (ML) INJECTION ONCE
Refills: 0 | Status: COMPLETED | OUTPATIENT
Start: 2019-01-01 | End: 2019-01-01

## 2019-01-01 RX ORDER — POTASSIUM CHLORIDE 20 MEQ
40 PACKET (EA) ORAL ONCE
Refills: 0 | Status: COMPLETED | OUTPATIENT
Start: 2019-01-01 | End: 2019-01-01

## 2019-01-01 RX ORDER — ACETAMINOPHEN 500 MG
650 TABLET ORAL ONCE
Refills: 0 | Status: COMPLETED | OUTPATIENT
Start: 2019-01-01 | End: 2019-01-01

## 2019-01-01 RX ORDER — PIPERACILLIN AND TAZOBACTAM 4; .5 G/20ML; G/20ML
3.38 INJECTION, POWDER, LYOPHILIZED, FOR SOLUTION INTRAVENOUS EVERY 6 HOURS
Refills: 0 | Status: DISCONTINUED | OUTPATIENT
Start: 2019-01-01 | End: 2019-01-01

## 2019-01-01 RX ORDER — DOCUSATE SODIUM 100 MG
100 CAPSULE ORAL THREE TIMES A DAY
Refills: 0 | Status: DISCONTINUED | OUTPATIENT
Start: 2019-01-01 | End: 2019-01-01

## 2019-01-01 RX ORDER — VANCOMYCIN HCL 1 G
1250 VIAL (EA) INTRAVENOUS EVERY 12 HOURS
Refills: 0 | Status: DISCONTINUED | OUTPATIENT
Start: 2019-01-01 | End: 2019-01-01

## 2019-01-01 RX ORDER — MITOXANTRONE 2 MG/ML
10 INJECTION, SOLUTION, CONCENTRATE INTRAVENOUS ONCE
Refills: 0 | Status: DISCONTINUED | OUTPATIENT
Start: 2019-01-01 | End: 2019-01-01

## 2019-01-01 RX ORDER — VANCOMYCIN HCL 1 G
1000 VIAL (EA) INTRAVENOUS EVERY 12 HOURS
Refills: 0 | Status: DISCONTINUED | OUTPATIENT
Start: 2019-01-01 | End: 2019-01-01

## 2019-01-01 RX ORDER — OXYCODONE AND ACETAMINOPHEN 5; 325 MG/1; MG/1
1 TABLET ORAL EVERY 6 HOURS
Refills: 0 | Status: DISCONTINUED | OUTPATIENT
Start: 2019-01-01 | End: 2019-01-01

## 2019-01-01 RX ORDER — IRON SUCROSE 20 MG/ML
200 INJECTION, SOLUTION INTRAVENOUS ONCE
Refills: 0 | Status: COMPLETED | OUTPATIENT
Start: 2019-01-01 | End: 2019-01-01

## 2019-01-01 RX ORDER — PIPERACILLIN AND TAZOBACTAM 4; .5 G/20ML; G/20ML
4.5 INJECTION, POWDER, LYOPHILIZED, FOR SOLUTION INTRAVENOUS EVERY 6 HOURS
Refills: 0 | Status: DISCONTINUED | OUTPATIENT
Start: 2019-01-01 | End: 2019-01-01

## 2019-01-01 RX ORDER — VANCOMYCIN HCL 1 G
VIAL (EA) INTRAVENOUS
Refills: 0 | Status: DISCONTINUED | OUTPATIENT
Start: 2019-01-01 | End: 2019-01-01

## 2019-01-01 RX ORDER — FLUOROURACIL 50 MG/ML
0 INJECTION, SOLUTION INTRAVENOUS
Qty: 0 | Refills: 0 | DISCHARGE
Start: 2019-01-01

## 2019-01-01 RX ORDER — DEXAMETHASONE 0.5 MG/5ML
16 ELIXIR ORAL ONCE
Refills: 0 | Status: DISCONTINUED | OUTPATIENT
Start: 2019-01-01 | End: 2019-01-01

## 2019-01-01 RX ORDER — BACITRACIN ZINC NEOMYCIN SULFATE POLYMYXIN B SULFATE 400; 3.5; 5 [IU]/G; MG/G; [IU]/G
1 OINTMENT TOPICAL
Qty: 0 | Refills: 0 | DISCHARGE
Start: 2019-01-01

## 2019-01-01 RX ORDER — SENNA PLUS 8.6 MG/1
1 TABLET ORAL DAILY
Refills: 0 | Status: DISCONTINUED | OUTPATIENT
Start: 2019-01-01 | End: 2019-01-01

## 2019-01-01 RX ORDER — FLUOROURACIL 50 MG/ML
1000 INJECTION, SOLUTION INTRAVENOUS ONCE
Refills: 0 | Status: DISCONTINUED | OUTPATIENT
Start: 2019-01-01 | End: 2019-01-01

## 2019-01-01 RX ORDER — OXALIPLATIN 5 MG/ML
100 INJECTION, SOLUTION INTRAVENOUS ONCE
Refills: 0 | Status: DISCONTINUED | OUTPATIENT
Start: 2019-01-01 | End: 2019-01-01

## 2019-01-01 RX ORDER — PIPERACILLIN AND TAZOBACTAM 4; .5 G/20ML; G/20ML
3.38 INJECTION, POWDER, LYOPHILIZED, FOR SOLUTION INTRAVENOUS ONCE
Refills: 0 | Status: COMPLETED | OUTPATIENT
Start: 2019-01-01 | End: 2019-01-01

## 2019-01-01 RX ORDER — GEMCITABINE 38 MG/ML
1000 INJECTION, SOLUTION INTRAVENOUS ONCE
Refills: 0 | Status: DISCONTINUED | OUTPATIENT
Start: 2019-01-01 | End: 2019-01-01

## 2019-01-01 RX ORDER — METRONIDAZOLE 500 MG
1 TABLET ORAL
Qty: 90 | Refills: 11
Start: 2019-01-01 | End: 2020-09-11

## 2019-01-01 RX ORDER — INFLUENZA VIRUS VACCINE 15; 15; 15; 15 UG/.5ML; UG/.5ML; UG/.5ML; UG/.5ML
0.5 SUSPENSION INTRAMUSCULAR ONCE
Refills: 0 | Status: DISCONTINUED | OUTPATIENT
Start: 2019-01-01 | End: 2019-01-01

## 2019-01-01 RX ORDER — PANTOPRAZOLE SODIUM 20 MG/1
40 TABLET, DELAYED RELEASE ORAL
Refills: 0 | Status: DISCONTINUED | OUTPATIENT
Start: 2019-01-01 | End: 2019-01-01

## 2019-01-01 RX ORDER — SODIUM CHLORIDE 9 MG/ML
500 INJECTION INTRAMUSCULAR; INTRAVENOUS; SUBCUTANEOUS ONCE
Refills: 0 | Status: COMPLETED | OUTPATIENT
Start: 2019-01-01 | End: 2019-01-01

## 2019-01-01 RX ADMIN — Medication 325 MILLIGRAM(S): at 19:31

## 2019-01-01 RX ADMIN — PANTOPRAZOLE SODIUM 40 MILLIGRAM(S): 20 TABLET, DELAYED RELEASE ORAL at 06:02

## 2019-01-01 RX ADMIN — PIPERACILLIN AND TAZOBACTAM 25 GRAM(S): 4; .5 INJECTION, POWDER, LYOPHILIZED, FOR SOLUTION INTRAVENOUS at 21:12

## 2019-01-01 RX ADMIN — Medication 40 MILLIEQUIVALENT(S): at 11:49

## 2019-01-01 RX ADMIN — SENNA PLUS 1 TABLET(S): 8.6 TABLET ORAL at 16:09

## 2019-01-01 RX ADMIN — PIPERACILLIN AND TAZOBACTAM 200 GRAM(S): 4; .5 INJECTION, POWDER, LYOPHILIZED, FOR SOLUTION INTRAVENOUS at 17:00

## 2019-01-01 RX ADMIN — Medication 100 GRAM(S): at 14:35

## 2019-01-01 RX ADMIN — BACITRACIN ZINC NEOMYCIN SULFATE POLYMYXIN B SULFATE 1 APPLICATION(S): 400; 3.5; 5 OINTMENT TOPICAL at 16:13

## 2019-01-01 RX ADMIN — BACITRACIN ZINC NEOMYCIN SULFATE POLYMYXIN B SULFATE 1 APPLICATION(S): 400; 3.5; 5 OINTMENT TOPICAL at 06:20

## 2019-01-01 RX ADMIN — BACITRACIN ZINC NEOMYCIN SULFATE POLYMYXIN B SULFATE 1 APPLICATION(S): 400; 3.5; 5 OINTMENT TOPICAL at 22:23

## 2019-01-01 RX ADMIN — PIPERACILLIN AND TAZOBACTAM 25 GRAM(S): 4; .5 INJECTION, POWDER, LYOPHILIZED, FOR SOLUTION INTRAVENOUS at 01:43

## 2019-01-01 RX ADMIN — Medication 100 MILLIGRAM(S): at 22:23

## 2019-01-01 RX ADMIN — BACITRACIN ZINC NEOMYCIN SULFATE POLYMYXIN B SULFATE 1 APPLICATION(S): 400; 3.5; 5 OINTMENT TOPICAL at 13:23

## 2019-01-01 RX ADMIN — Medication 100 MILLIGRAM(S): at 06:20

## 2019-01-01 RX ADMIN — Medication 325 MILLIGRAM(S): at 18:31

## 2019-01-01 RX ADMIN — Medication 650 MILLIGRAM(S): at 07:26

## 2019-01-01 RX ADMIN — Medication 100 MILLIGRAM(S): at 22:02

## 2019-01-01 RX ADMIN — Medication 650 MILLIGRAM(S): at 06:44

## 2019-01-01 RX ADMIN — Medication 250 MILLIGRAM(S): at 06:06

## 2019-01-01 RX ADMIN — PANTOPRAZOLE SODIUM 40 MILLIGRAM(S): 20 TABLET, DELAYED RELEASE ORAL at 06:00

## 2019-01-01 RX ADMIN — BACITRACIN ZINC NEOMYCIN SULFATE POLYMYXIN B SULFATE 1 APPLICATION(S): 400; 3.5; 5 OINTMENT TOPICAL at 06:08

## 2019-01-01 RX ADMIN — Medication 325 MILLIGRAM(S): at 06:27

## 2019-01-01 RX ADMIN — PIPERACILLIN AND TAZOBACTAM 200 GRAM(S): 4; .5 INJECTION, POWDER, LYOPHILIZED, FOR SOLUTION INTRAVENOUS at 05:51

## 2019-01-01 RX ADMIN — SODIUM CHLORIDE 75 MILLILITER(S): 9 INJECTION INTRAMUSCULAR; INTRAVENOUS; SUBCUTANEOUS at 13:55

## 2019-01-01 RX ADMIN — Medication 325 MILLIGRAM(S): at 12:57

## 2019-01-01 RX ADMIN — Medication 100 MILLIGRAM(S): at 09:46

## 2019-01-01 RX ADMIN — BACITRACIN ZINC NEOMYCIN SULFATE POLYMYXIN B SULFATE 1 APPLICATION(S): 400; 3.5; 5 OINTMENT TOPICAL at 13:04

## 2019-01-01 RX ADMIN — PIPERACILLIN AND TAZOBACTAM 200 GRAM(S): 4; .5 INJECTION, POWDER, LYOPHILIZED, FOR SOLUTION INTRAVENOUS at 09:47

## 2019-01-01 RX ADMIN — Medication 100 MILLIGRAM(S): at 13:21

## 2019-01-01 RX ADMIN — PANTOPRAZOLE SODIUM 40 MILLIGRAM(S): 20 TABLET, DELAYED RELEASE ORAL at 06:20

## 2019-01-01 RX ADMIN — ENOXAPARIN SODIUM 40 MILLIGRAM(S): 100 INJECTION SUBCUTANEOUS at 11:49

## 2019-01-01 RX ADMIN — SODIUM CHLORIDE 500 MILLILITER(S): 9 INJECTION INTRAMUSCULAR; INTRAVENOUS; SUBCUTANEOUS at 14:03

## 2019-01-01 RX ADMIN — BACITRACIN ZINC NEOMYCIN SULFATE POLYMYXIN B SULFATE 1 APPLICATION(S): 400; 3.5; 5 OINTMENT TOPICAL at 05:43

## 2019-01-01 RX ADMIN — Medication 100 MILLIGRAM(S): at 06:07

## 2019-01-01 RX ADMIN — BACITRACIN ZINC NEOMYCIN SULFATE POLYMYXIN B SULFATE 1 APPLICATION(S): 400; 3.5; 5 OINTMENT TOPICAL at 21:36

## 2019-01-01 RX ADMIN — BACITRACIN ZINC NEOMYCIN SULFATE POLYMYXIN B SULFATE 1 APPLICATION(S): 400; 3.5; 5 OINTMENT TOPICAL at 07:03

## 2019-01-01 RX ADMIN — OXYCODONE AND ACETAMINOPHEN 1 TABLET(S): 5; 325 TABLET ORAL at 00:53

## 2019-01-01 RX ADMIN — Medication 325 MILLIGRAM(S): at 00:17

## 2019-01-01 RX ADMIN — PANTOPRAZOLE SODIUM 40 MILLIGRAM(S): 20 TABLET, DELAYED RELEASE ORAL at 06:07

## 2019-01-01 RX ADMIN — PIPERACILLIN AND TAZOBACTAM 200 GRAM(S): 4; .5 INJECTION, POWDER, LYOPHILIZED, FOR SOLUTION INTRAVENOUS at 16:12

## 2019-01-01 RX ADMIN — BACITRACIN ZINC NEOMYCIN SULFATE POLYMYXIN B SULFATE 1 APPLICATION(S): 400; 3.5; 5 OINTMENT TOPICAL at 06:03

## 2019-01-01 RX ADMIN — PIPERACILLIN AND TAZOBACTAM 200 GRAM(S): 4; .5 INJECTION, POWDER, LYOPHILIZED, FOR SOLUTION INTRAVENOUS at 03:30

## 2019-01-01 RX ADMIN — Medication 325 MILLIGRAM(S): at 16:45

## 2019-01-01 RX ADMIN — Medication 100 MILLIGRAM(S): at 07:02

## 2019-01-01 RX ADMIN — PIPERACILLIN AND TAZOBACTAM 200 GRAM(S): 4; .5 INJECTION, POWDER, LYOPHILIZED, FOR SOLUTION INTRAVENOUS at 12:03

## 2019-01-01 RX ADMIN — OXYCODONE AND ACETAMINOPHEN 1 TABLET(S): 5; 325 TABLET ORAL at 23:53

## 2019-01-01 RX ADMIN — OXYCODONE AND ACETAMINOPHEN 1 TABLET(S): 5; 325 TABLET ORAL at 16:14

## 2019-01-01 RX ADMIN — Medication 250 MILLIGRAM(S): at 19:26

## 2019-01-01 RX ADMIN — PIPERACILLIN AND TAZOBACTAM 200 GRAM(S): 4; .5 INJECTION, POWDER, LYOPHILIZED, FOR SOLUTION INTRAVENOUS at 22:01

## 2019-01-01 RX ADMIN — Medication 325 MILLIGRAM(S): at 11:57

## 2019-01-01 RX ADMIN — Medication 250 MILLIGRAM(S): at 18:21

## 2019-01-01 RX ADMIN — Medication 250 MILLIGRAM(S): at 07:32

## 2019-01-01 RX ADMIN — SENNA PLUS 1 TABLET(S): 8.6 TABLET ORAL at 22:02

## 2019-01-01 RX ADMIN — Medication 250 MILLIGRAM(S): at 06:20

## 2019-01-01 RX ADMIN — Medication 325 MILLIGRAM(S): at 06:57

## 2019-01-01 RX ADMIN — PANTOPRAZOLE SODIUM 40 MILLIGRAM(S): 20 TABLET, DELAYED RELEASE ORAL at 06:19

## 2019-01-01 RX ADMIN — BACITRACIN ZINC NEOMYCIN SULFATE POLYMYXIN B SULFATE 1 APPLICATION(S): 400; 3.5; 5 OINTMENT TOPICAL at 23:55

## 2019-01-01 RX ADMIN — BACITRACIN ZINC NEOMYCIN SULFATE POLYMYXIN B SULFATE 1 APPLICATION(S): 400; 3.5; 5 OINTMENT TOPICAL at 21:16

## 2019-01-01 RX ADMIN — PANTOPRAZOLE SODIUM 40 MILLIGRAM(S): 20 TABLET, DELAYED RELEASE ORAL at 06:28

## 2019-01-01 RX ADMIN — Medication 100 MILLIGRAM(S): at 23:54

## 2019-01-01 RX ADMIN — Medication 166.67 MILLIGRAM(S): at 07:43

## 2019-01-01 RX ADMIN — PIPERACILLIN AND TAZOBACTAM 25 GRAM(S): 4; .5 INJECTION, POWDER, LYOPHILIZED, FOR SOLUTION INTRAVENOUS at 18:33

## 2019-01-01 RX ADMIN — PIPERACILLIN AND TAZOBACTAM 200 GRAM(S): 4; .5 INJECTION, POWDER, LYOPHILIZED, FOR SOLUTION INTRAVENOUS at 23:58

## 2019-01-01 RX ADMIN — BACITRACIN ZINC NEOMYCIN SULFATE POLYMYXIN B SULFATE 1 APPLICATION(S): 400; 3.5; 5 OINTMENT TOPICAL at 22:02

## 2019-01-01 RX ADMIN — Medication 325 MILLIGRAM(S): at 01:17

## 2019-01-01 RX ADMIN — PIPERACILLIN AND TAZOBACTAM 25 GRAM(S): 4; .5 INJECTION, POWDER, LYOPHILIZED, FOR SOLUTION INTRAVENOUS at 03:23

## 2019-01-01 RX ADMIN — PANTOPRAZOLE SODIUM 40 MILLIGRAM(S): 20 TABLET, DELAYED RELEASE ORAL at 07:02

## 2019-01-01 RX ADMIN — Medication 250 MILLIGRAM(S): at 18:01

## 2019-01-01 RX ADMIN — BACITRACIN ZINC NEOMYCIN SULFATE POLYMYXIN B SULFATE 1 APPLICATION(S): 400; 3.5; 5 OINTMENT TOPICAL at 05:59

## 2019-01-01 RX ADMIN — ENOXAPARIN SODIUM 40 MILLIGRAM(S): 100 INJECTION SUBCUTANEOUS at 14:34

## 2019-01-01 RX ADMIN — PIPERACILLIN AND TAZOBACTAM 25 GRAM(S): 4; .5 INJECTION, POWDER, LYOPHILIZED, FOR SOLUTION INTRAVENOUS at 13:21

## 2019-01-01 RX ADMIN — PIPERACILLIN AND TAZOBACTAM 25 GRAM(S): 4; .5 INJECTION, POWDER, LYOPHILIZED, FOR SOLUTION INTRAVENOUS at 06:02

## 2019-01-01 RX ADMIN — OXYCODONE AND ACETAMINOPHEN 1 TABLET(S): 5; 325 TABLET ORAL at 14:38

## 2019-01-01 RX ADMIN — POLYETHYLENE GLYCOL 3350 17 GRAM(S): 17 POWDER, FOR SOLUTION ORAL at 22:02

## 2019-01-01 RX ADMIN — PIPERACILLIN AND TAZOBACTAM 25 GRAM(S): 4; .5 INJECTION, POWDER, LYOPHILIZED, FOR SOLUTION INTRAVENOUS at 09:00

## 2019-01-01 RX ADMIN — ENOXAPARIN SODIUM 40 MILLIGRAM(S): 100 INJECTION SUBCUTANEOUS at 13:21

## 2019-01-01 RX ADMIN — Medication 250 MILLIGRAM(S): at 18:12

## 2019-01-01 RX ADMIN — BACITRACIN ZINC NEOMYCIN SULFATE POLYMYXIN B SULFATE 1 APPLICATION(S): 400; 3.5; 5 OINTMENT TOPICAL at 13:40

## 2019-01-01 RX ADMIN — Medication 100 MILLIGRAM(S): at 14:35

## 2019-01-01 RX ADMIN — Medication 166.67 MILLIGRAM(S): at 18:30

## 2019-01-01 RX ADMIN — Medication 166.67 MILLIGRAM(S): at 06:41

## 2019-01-01 RX ADMIN — Medication 100 MILLIGRAM(S): at 21:36

## 2019-01-01 RX ADMIN — ENOXAPARIN SODIUM 40 MILLIGRAM(S): 100 INJECTION SUBCUTANEOUS at 12:03

## 2019-01-01 RX ADMIN — Medication 100 MILLIGRAM(S): at 13:40

## 2019-01-01 RX ADMIN — PIPERACILLIN AND TAZOBACTAM 25 GRAM(S): 4; .5 INJECTION, POWDER, LYOPHILIZED, FOR SOLUTION INTRAVENOUS at 19:55

## 2019-01-01 RX ADMIN — Medication 250 MILLIGRAM(S): at 06:28

## 2019-01-01 RX ADMIN — PIPERACILLIN AND TAZOBACTAM 25 GRAM(S): 4; .5 INJECTION, POWDER, LYOPHILIZED, FOR SOLUTION INTRAVENOUS at 14:35

## 2019-01-01 RX ADMIN — Medication 100 MILLIGRAM(S): at 06:02

## 2019-01-01 RX ADMIN — PIPERACILLIN AND TAZOBACTAM 25 GRAM(S): 4; .5 INJECTION, POWDER, LYOPHILIZED, FOR SOLUTION INTRAVENOUS at 23:56

## 2019-01-01 RX ADMIN — IRON SUCROSE 110 MILLIGRAM(S): 20 INJECTION, SOLUTION INTRAVENOUS at 05:59

## 2019-01-01 RX ADMIN — POLYETHYLENE GLYCOL 3350 17 GRAM(S): 17 POWDER, FOR SOLUTION ORAL at 14:35

## 2019-01-01 RX ADMIN — PIPERACILLIN AND TAZOBACTAM 25 GRAM(S): 4; .5 INJECTION, POWDER, LYOPHILIZED, FOR SOLUTION INTRAVENOUS at 07:53

## 2019-05-28 ENCOUNTER — TRANSCRIPTION ENCOUNTER (OUTPATIENT)
Age: 69
End: 2019-05-28

## 2019-09-11 NOTE — H&P ADULT - PROBLEM SELECTOR PLAN 5
1) PCP Contacted on Admission: (Y/N) --> Name & Phone #: Dr. Orr [phone]  2) Date of Contact with PCP:  3) PCP Contacted at Discharge: (Y/N, N/A)  4) Summary of Handoff Given to PCP:   5) Post-Discharge Appointment Date and Location: F: none  E: replete K <4, Mg <2  N: Regular  GI Ppx: Protonix  DVT Ppx: SCDs  Code status: FULL  Dispo: [facility]

## 2019-09-11 NOTE — H&P ADULT - HISTORY OF PRESENT ILLNESS
Patient is a 67yo female with PMH breast cancer (diagnosed in 2018), anemia, MVP presenting with dyspnea and chest pain on exertion and worsening fatigue. About 1 month ago she started feeling weak and tired. 2-3 weeks ago she started getting left sided chest pain and shortness of breath upon exerting herself. Pain and sob come on after walking 10 feet. She went to an Urgent Care on Saturday 9/7 and they wanted to do an EKG but patient refused. She saw Dr. Orr yesterday who did blood tests. She will be starting treatment next week (pt unsure whether chemo/radiation). She received a call from him today that he Hgb was 4 and she needed to come into the hospital for a blood transfusion. She report lightheadedness, dizziness, pain in her mid-upper back, photophobia, nasal congestion, feeling of lump in throat, decreased appetite, 50lb weight loss since 11/2018, dysuria. She admits to having loose, watery BMs every other day for the past month. Says her BMs sometimes look dark red because she drinks a lot of beet juice. Last BM today was loose, watery and dark red. She also reports bowel incontinence. She was treated 3 weeks ago with a Z-pack for nasal congestion and throat pain. She has been taking herbal supplements, multivitamins and reports she has been trying to manage her breast cancer holistically.     In the ED vs: 97.6, 112/61, 94, 16, 100%   Labs: wbc 11, hgb 3.6, hct 12, plts 567, glu 112, Alb 3, PT 13.5, INR 1.19  Imaging: EKG nsr, CXR   Received: 1L NS bolus, 1u pRBCs, currently receiving another unit pRBCs  Patient will be [facility] for further management Patient is a 67yo female with PMH breast cancer (diagnosed in 2018), anemia, MVP presenting with dyspnea and chest pain on exertion and worsening fatigue. About 1 month ago she started feeling weak and tired. 2-3 weeks ago she started getting left sided chest pain and shortness of breath upon exerting herself. Pain and sob come on after walking 10 feet. She went to an Urgent Care on Saturday 9/7 and they wanted to do an EKG but patient refused. She saw Dr. Orr yesterday who did blood tests. She will be starting treatment next week (pt unsure whether chemo/radiation). She received a call from him today that he Hgb was 4 and she needed to come into the hospital for a blood transfusion. She report lightheadedness, dizziness, pain in her mid-upper back, photophobia, nasal congestion, feeling of lump in throat, decreased appetite, 50lb weight loss since 11/2018, dysuria. She admits to having loose, watery BMs every other day for the past month. Says her BMs sometimes look dark red because she drinks a lot of beet juice. Last BM today was loose, watery and dark red. She also reports bowel incontinence. She was treated 3 weeks ago with a Z-pack for nasal congestion and throat pain. She has been taking herbal supplements, multivitamins and reports she has been trying to manage her breast cancer holistically. She admits to taking Advil 2-3x per week for R breast pain which helps.     In the ED vs: 97.6, 112/61, 94, 16, 100%   Labs: wbc 11, hgb 3.6, hct 12, plts 567, glu 112, Alb 3, PT 13.5, INR 1.19  Imaging: EKG nsr, CXR   Received: 1L NS bolus, currently receiving 1u pRBCs, another 1u pRBCs ordered   Patient will be admitted to [facility] for further management Patient is a 69yo female with PMH breast cancer (diagnosed in 2018), anemia, MVP presenting with dyspnea and chest pain on exertion and worsening fatigue. About 1 month ago she started feeling weak and tired. 2-3 weeks ago she started getting left sided chest pain and shortness of breath upon exerting herself. Pain and sob come on after walking 10 feet. She went to an Urgent Care on Saturday 9/7 and they wanted to do an EKG but patient refused. She saw Dr. Orr yesterday who did blood tests. She will be starting treatment for her breast cancer next week (pt unsure whether chemo/radiation). She received a call from him today that her Hgb was 4 and she needed to come into the hospital for a blood transfusion. She reports lightheadedness, dizziness, pain in her mid-upper back, photophobia, nasal congestion, feeling of lump in throat, decreased appetite, 50lb weight loss since 11/2018, dysuria. She admits to having loose, watery BMs every other day for the past month. Says her BMs sometimes look dark red because she drinks a lot of beet juice. Last BM today was loose, watery and dark red. She also reports bowel incontinence. She was treated 3 weeks ago with a Z-pack for nasal congestion and throat pain. She has been taking herbal supplements, multivitamins and reports she has been trying to manage her breast cancer holistically. She admits to taking Advil 2-3x per week for R breast pain which helps.     In the ED vs: 97.6, 112/61, 94, 16, 100%   Labs: wbc 11, hgb 3.6, hct 12, plts 567, glu 112, Alb 3, PT 13.5, INR 1.19  Imaging: EKG nsr, CXR   Received: 1L NS bolus, currently receiving 1u pRBCs, another 1u pRBCs ordered   Patient will be admitted to [facility] for further management Patient is a 67yo female with PMH breast cancer (diagnosed in 2018), anemia, MVP presenting with dyspnea and chest pain on exertion and worsening fatigue. About 1 month ago she started feeling weak and tired. 2-3 weeks ago she started getting left sided chest pain and shortness of breath upon exerting herself. Pain and sob come on after walking 10 feet. She went to an Urgent Care on Saturday 9/7 and they wanted to do an EKG but patient refused. She saw Dr. Orr yesterday who did blood tests. She will be starting treatment for her breast cancer next week (pt unsure whether chemo/radiation). She received a call from him today that her Hgb was 4 and she needed to come into the hospital for a blood transfusion. She reports lightheadedness, dizziness, pain in her mid-upper back, photophobia, nasal congestion, feeling of lump in throat, decreased appetite, 50lb weight loss since 11/2018. She admits to having loose, watery BMs every other day for the past month. Says her BMs sometimes look dark red because she drinks a lot of beet juice. Last BM today was loose, watery and dark red. She also reports bowel incontinence. She was treated 3 weeks ago with a Z-pack for nasal congestion and throat pain. She has been taking herbal supplements, multivitamins and reports she has been trying to manage her breast cancer holistically. She admits to taking Advil 2-3x per week for R breast pain which helps. She denies headache, fevers, chills, abdominal pain, dysuria, hematuria.     In the ED vs: 97.6, 112/61, 94, 16, 100%   Labs: wbc 11, hgb 3.6, hct 12, plts 567, glu 112, Alb 3, PT 13.5, INR 1.19  Imaging: EKG nsr, CXR   Received: 1L NS bolus, currently receiving 1u pRBCs, another 1u pRBCs ordered   Patient will be admitted to [facility] for further management

## 2019-09-11 NOTE — H&P ADULT - NSHPSOCIALHISTORY_GEN_ALL_CORE
Denies smoking tobacco, recreational drug use, alcohol use. Reports her brother has been staying with her.

## 2019-09-11 NOTE — ED PROVIDER NOTE - CLINICAL SUMMARY MEDICAL DECISION MAKING FREE TEXT BOX
avss. tired appearing. NAD. no active GIB but guaiac+ brown stools. found to have symptomatic microcytic anemia hb 3s. s/p protonix and prbc transfusion. no coagulopathy. trop neg. ekg w/o acute abnl. cxr w/o acute focal consol vs pulm edema vs ptx vs significant mass. will admit to medicine for transfusion/evaluation.

## 2019-09-11 NOTE — ED ADULT NURSE REASSESSMENT NOTE - NS ED NURSE REASSESS COMMENT FT1
Patient to receive blood transfusion. Labs reviewed. Patient consent in chart. Patient educated on possible signs of blood transfusion and informed to notify staff if patient were to develop any severe respiratory distress, flank pain, or any other major concerns. Patient with two large bore IV access sites. Patient to receive VS per policy and procedure. Blood product checked with second RN. Will continue to monitor with frequent VS and for possible transfusion reactions.

## 2019-09-11 NOTE — H&P ADULT - NSHPLABSRESULTS_GEN_ALL_CORE
LABS:                         3.6    11.02 )-----------( 567      ( 11 Sep 2019 11:51 )             12.0     09-11    138  |  105  |  9   ----------------------------<  112<H>  3.8   |  23  |  0.88    Ca    8.8      11 Sep 2019 11:51    TPro  6.1  /  Alb  3.0<L>  /  TBili  0.2  /  DBili  x   /  AST  24  /  ALT  9<L>  /  AlkPhos  67  09-11    PT/INR - ( 11 Sep 2019 11:51 )   PT: 13.5 sec;   INR: 1.19          PTT - ( 11 Sep 2019 11:51 )  PTT:31.1 sec    CARDIAC MARKERS ( 11 Sep 2019 11:51 )  x     / <0.01 ng/mL / x     / x     / x                RADIOLOGY, EKG & ADDITIONAL TESTS: Reviewed

## 2019-09-11 NOTE — ED PROVIDER NOTE - PHYSICAL EXAMINATION
CONST: tired appearing NAD speaking in full sentences  HEAD: atraumatic  EYES: conjunctivae pallor  ENT: mmm  NECK: supple  CARD: rrr no murmurs  CHEST: ctab no r/r/w, no stridor/retractions/tripoding  ABD: soft, nd, nttp, no rebound/gaurding  RECTAL: guaiac+ brown stool  EXT: FROM, symmetric distal pulses intact  SKIN: warm, dry, no rash, no pedal edema, cap refill <2sec  NEURO: a+ox3, 5/5 strength x4, gross sensation intact x4

## 2019-09-11 NOTE — H&P ADULT - ATTENDING COMMENTS
Patient was seen and examined with the resident team today.  I agree with Dr. Gutierrez's assessment and plan with the following exceptions/additions:     Briefly, this is a 67yo woman with a PMH of R-sided breast cancer (2018, deferred treatment) c/b a fungating, bleeding mass, iron deficiency anemia and MVP who p/w chronic but progressive lethargy, SOB/FUNK and exertional CP after outpatient labs revealed profound anemia and subsequently found to have a Hb of 3.6 on admission.  Notes that her mass has been present for about 6 months and progressive in size and discharge.  Whenever she cleans it she notes that it oozes blood and even "squirts...gushes blood."  She changes her dressings several times a day but no always 2/2 blood loss.  She does have chronic, foul-smelling discharge.  Pt reports "reddish" stools that she associates with the initiation and on days that she consumes beet juice.  Denies any hematuria or abnormal urine bleed.  Remainder of ROS as noted above.    Exam - chronically-ill appearing, +pale, +conjunctival pallor and nailbed pallor, MMM, clear OP, RRR, very large malodorous R-fungating breast mass w/serosanguinous drainage, CTA B, +BS soft NTND, WWP, no c/c/e, AOx3, pleasant/conversant/appropriate.      In summary,  this is a 67yo woman with a PMH of R-sided breast cancer (2018, deferred treatment) c/b a fungating, bleeding mass, iron deficiency anemia and MVP who p/w chronic symptomatic anemia i/s/o a chronically bleeding, fungating breast mass.  Despite have a +Guaiac, I suspect her anemia is multifactorial - (1) anemia of chronic disease 2/2 to her malignancy and (2) chronic blood loss anemia from her fungating breast mass.  Her reports of "reddish" stools appear to be associated w/food consumption.  Additionally, her hemodynamics are stable; thus, less suspicious for an acute UGIB.      -- transfuse for Hb >7  -- addon full anemia work-up to pre-transfusion labs  -- H/O consult, RE: anemia, ?candidate for radiation for fungating mass  -- collateral from her outpatient oncologist   -- DVT PPx - SCD  -- Dispo - TBD     Bertha Barrow  [phone]

## 2019-09-11 NOTE — ED ADULT NURSE NOTE - NSIMPLEMENTINTERV_GEN_ALL_ED
Implemented All Fall with Harm Risk Interventions:  [place] to call system. Call bell, personal items and telephone within reach. Instruct patient to call for assistance. Room bathroom lighting operational. Non-slip footwear when patient is off stretcher. Physically safe environment: no spills, clutter or unnecessary equipment. Stretcher in lowest position, wheels locked, appropriate side rails in place. Provide visual cue, wrist band, yellow gown, etc. Monitor gait and stability. Monitor for mental status changes and reorient to person, place, and time. Review medications for side effects contributing to fall risk. Reinforce activity limits and safety measures with patient and family. Provide visual clues: red socks.

## 2019-09-11 NOTE — H&P ADULT - NSICDXPASTSURGICALHX_GEN_ALL_CORE_FT
PAST SURGICAL HISTORY:  H/O hemorrhoidectomy     H/O inguinal hernia repair     H/O umbilical hernia repair

## 2019-09-11 NOTE — H&P ADULT - PROBLEM SELECTOR PLAN 1
Presenting with worsening weakness, fatigue, chest pain/dyspnea on exertion for the past month. Dr. Orr called pt today after seeing her in office yesterday saying her hgb was 4 and she needs to come to hospital for a blood transfusion. H/o of iron deficiency anemia in past. Previously took iron, last time about 5 years ago. Had a colonoscopy 3 years ago which was normal, does not remember name of GI doc in [place]. 1 month of loose watery BMs every other day. Reports BMs are sometimes dark red in color due to th beet juice she drinks. Admits to taking Advil 2-3x per week (2 tabs at a time) for R breast pain. Thinks her baseline Hgb is 10. Previous cbc from 2017 showing hgb 10.7. Hgb today 3.6. Received 1L NS bolus in ED.   -currently receiving 1u pRBCs, another 1u pRBCs ordered   -f/u 10pm cbc  -f/u iron studies  -monitor for signs/symptoms of bleed  -orthostatics negative   -BP stable at 100/61   -GI consulted, f/u recs   -Transfuse for hgb <7 Presenting with worsening weakness, fatigue, chest pain/dyspnea on exertion for the past month. Dr. Orr called pt today after seeing her in office yesterday saying her hgb was 4 and she needs to come to hospital for a blood transfusion. H/o of iron deficiency anemia in past. Previously took iron, last time about 5 years ago. Had a colonoscopy 3 years ago which was normal, does not remember name of GI doc in [place]. 1 month of loose watery BMs every other day. Reports BMs are sometimes dark red in color due to th beet juice she drinks. Admits to taking Advil 2-3x per week (2 tabs at a time) for R breast pain. Thinks her baseline Hgb is 10. Previous cbc from 2017 showing hgb 10.7. Hgb today 3.6. Received 1L NS bolus in ED.   -currently receiving 1u pRBCs, another 1u pRBCs ordered   -f/u 10pm cbc  -f/u iron studies  -monitor for signs/symptoms of bleed  -orthostatics negative   -BP stable at 100/61   -GI consulted, f/u recs   -Transfuse for hgb <7  -CBC AM Presenting with worsening weakness, fatigue, chest pain/dyspnea on exertion for the past month. Dr. Orr called pt today after seeing her in office yesterday saying her hgb was 4 and she needs to come to hospital for a blood transfusion. H/o of iron deficiency anemia in past. Previously took iron, last time about 5 years ago. Had a colonoscopy 3 years ago which was normal, does not remember name of GI doc in [place]. 1 month of loose watery BMs every other day. Reports BMs are sometimes dark red in color due to th beet juice she drinks. Admits to taking Advil 2-3x per week (2 tabs at a time) for R breast pain. Thinks her baseline Hgb is 10. Previous cbc from 2017 showing hgb 10.7. Hgb today 3.6. Received 1L NS bolus in ED.   -currently receiving 1u pRBCs, another 1u pRBCs ordered   -f/u 10pm cbc  -f/u iron studies, reticulocyte count  -monitor for signs/symptoms of bleed  -orthostatics negative   -BP stable at 100/61   -GI consulted, f/u recs   -Transfuse for hgb <7  -CBC AM Presenting with worsening weakness, fatigue, chest pain/dyspnea on exertion for the past month. Dr. Orr called pt today after seeing her in office yesterday saying her hgb was 4 and she needs to come to hospital for a blood transfusion. H/o of iron deficiency anemia in past. Previously took iron, last time about 5 years ago. Had a colonoscopy 3 years ago which was normal, does not remember name of GI doc in [place]. 1 month of loose watery BMs every other day. Reports BMs are sometimes dark red in color due to th beet juice she drinks. Admits to taking Advil 2-3x per week (2 tabs at a time) for R breast pain. Says that her R breast mass does bleed. Thinks her baseline Hgb is 10. Previous cbc from 2017 showing hgb 10.7. Hgb today 3.6. Received 1L NS bolus in ED. Likely 2/2 to iron deficiency vs GI bleed vs bleeding R breast mass.   -currently receiving 1u pRBCs, another 1u pRBCs ordered   -f/u 10pm cbc  -f/u iron studies, reticulocyte count  -monitor for signs/symptoms of bleed  -orthostatics negative   -BP stable at 100/61   -GI consulted, f/u recs   -Heme/onc consult  -Transfuse for hgb <7  -CBC AM Presenting with worsening weakness, fatigue, chest pain/dyspnea on exertion for the past month. Dr. Orr called pt today after seeing her in office yesterday saying her hgb was 4 and she needs to come to hospital for a blood transfusion. H/o of iron deficiency anemia in past. Previously took iron, last time about 5 years ago. Had a colonoscopy 3 years ago which was normal, does not remember name of GI doc in [place]. 1 month of loose watery BMs every other day. Reports BMs are sometimes dark red in color due to th beet juice she drinks. Admits to taking Advil 2-3x per week (2 tabs at a time) for R breast pain. Says that her R breast mass does bleed. Thinks her baseline Hgb is 10. Previous cbc from 2017 showing hgb 10.7. Hgb today 3.6. Received 1L NS bolus in ED. Likely 2/2 to iron deficiency vs GI bleed vs bleeding R breast mass.   -currently receiving 1u pRBCs, another 1u pRBCs ordered   -f/u 10pm cbc  -f/u iron studies, reticulocyte count  -monitor for signs/symptoms of bleed  -orthostatics negative   -BP stable at 100/61    -Heme/onc consult  -Consider GI consult in AM if no improvement in Hgb or reports melena/red blood per rectum  -Transfuse for hgb <7  -CBC AM

## 2019-09-11 NOTE — H&P ADULT - PROBLEM SELECTOR PLAN 2
No h/o of GI bleed in past. Colonoscopy 3 years ago negative. Hgb today 3.6. Possibly a slow subacute GI bleed vs iron deficiency anemia. Rectal exam negative for masses, melena, bright red blood. Does report 1 month of loose watery BMs every other day. Reports BMs are sometimes dark red in color due to the beet juice she drinks.   -Management as per above No h/o of GI bleed in past. Does have a history of hemorrhoids. Colonoscopy 3 years ago negative. Hgb today 3.6. Possibly a slow subacute GI bleed vs iron deficiency anemia. Rectal exam negative for masses, melena, bright red blood. Does report 1 month of loose watery BMs every other day. Reports BMs are sometimes dark red in color due to the beet juice she drinks.   -Management as per above

## 2019-09-11 NOTE — H&P ADULT - ASSESSMENT
Patient is a 69yo female with PMH breast cancer (diagnosed in 2018), anemia, MVP presenting with dyspnea and chest pain on exertion and worsening fatigue. She is being admitted for symptomatic anemia.

## 2019-09-11 NOTE — H&P ADULT - NSHPPHYSICALEXAM_GEN_ALL_CORE
VITAL SIGNS:  T(C): 36.8 (09-11-19 @ 15:53), Max: 37.3 (09-11-19 @ 13:19)  T(F): 98.2 (09-11-19 @ 15:53), Max: 99.2 (09-11-19 @ 14:15)  HR: 72 (09-11-19 @ 15:53) (72 - 94)  BP: 96/56 (09-11-19 @ 15:53) (93/54 - 112/61)  BP(mean): --  RR: 16 (09-11-19 @ 15:53) (16 - 16)  SpO2: 100% (09-11-19 @ 15:53) (100% - 100%)  Wt(kg): --    PHYSICAL EXAM:    Constitutional: WDWN; resting comfortably in bed; pale appearing; NAD  Head: NC/AT  Eyes: PERRL, EOMI, anicteric sclera  ENT: no nasal discharge; uvula midline, no oropharyngeal erythema or exudates; MMM  Neck: supple; no JVD or thyromegaly  Respiratory: CTA B/L; no W/R/R, no retractions  Cardiac: +S1/S2; RRR; no M/R/G; PMI non-displaced  Breast: Enlarged R breast with malodorous discharge and mass present    Gastrointestinal: abdomen soft, NT/ND; no rebound or guarding; +BSx4  Extremities: WWP, no clubbing or cyanosis; no peripheral edema  Musculoskeletal: NROM x4; no joint swelling, tenderness or erythema  Vascular: 2+ radial, femoral, DP/PT pulses B/L  Dermatologic: skin warm, dry and intact;   Lymphatic: no submandibular or cervical LAD  Neurologic: AAOx3; CNII-XII grossly intact; no focal deficits  Psychiatric: affect and characteristics of appearance, verbalizations, behaviors are appropriate VITAL SIGNS:  T(C): 36.8 (09-11-19 @ 15:53), Max: 37.3 (09-11-19 @ 13:19)  T(F): 98.2 (09-11-19 @ 15:53), Max: 99.2 (09-11-19 @ 14:15)  HR: 72 (09-11-19 @ 15:53) (72 - 94)  BP: 96/56 (09-11-19 @ 15:53) (93/54 - 112/61)  BP(mean): --  RR: 16 (09-11-19 @ 15:53) (16 - 16)  SpO2: 100% (09-11-19 @ 15:53) (100% - 100%)  Wt(kg): --    PHYSICAL EXAM:    Constitutional: WDWN; resting comfortably in bed; pale appearing; NAD  Head: NC/AT  Eyes: PERRL, EOMI, anicteric sclera  ENT: no nasal discharge; uvula midline, no oropharyngeal erythema or exudates; MMM  Neck: supple; no JVD or thyromegaly  Respiratory: CTA B/L; no W/R/R, no retractions  Cardiac: +S1/S2; RRR; no M/R/G; PMI non-displaced  Breast: Enlarged R breast with malodorous discharge and mass present    Gastrointestinal: abdomen soft, NT/ND; no rebound or guarding; +BSx4  Extremities: WWP, no clubbing or cyanosis; no peripheral edema  Musculoskeletal: NROM x4; no joint swelling, tenderness or erythema  Vascular: 2+ radial, femoral, DP/PT pulses B/L  Dermatologic: skin warm, dry and intact  Lymphatic: cervical LAD  Neurologic: AAOx3; CNII-XII grossly intact; no focal deficits  Psychiatric: affect and characteristics of appearance, verbalizations, behaviors are appropriate VITAL SIGNS:  T(C): 36.8 (09-11-19 @ 15:53), Max: 37.3 (09-11-19 @ 13:19)  T(F): 98.2 (09-11-19 @ 15:53), Max: 99.2 (09-11-19 @ 14:15)  HR: 72 (09-11-19 @ 15:53) (72 - 94)  BP: 96/56 (09-11-19 @ 15:53) (93/54 - 112/61)  BP(mean): --  RR: 16 (09-11-19 @ 15:53) (16 - 16)  SpO2: 100% (09-11-19 @ 15:53) (100% - 100%)  Wt(kg): --    PHYSICAL EXAM:    Constitutional: WDWN; resting comfortably in bed; pale appearing; NAD  Head: NC/AT  Eyes: PERRL, EOMI, anicteric sclera  ENT: no nasal discharge; uvula midline, no oropharyngeal erythema or exudates; MMM  Neck: supple; no JVD or thyromegaly  Respiratory: CTA B/L; no W/R/R, no retractions  Cardiac: +S1/S2; RRR; holosystolic murmur heard at TONO borderd; no R/G; PMI non-displaced  Breast: Enlarged R breast with malodorous discharge and mass present    Gastrointestinal: abdomen soft, NT/ND; no rebound or guarding; +BSx4  Extremities: WWP, no clubbing or cyanosis; no peripheral edema  Musculoskeletal: NROM x4; no joint swelling, tenderness or erythema  Vascular: 2+ radial, femoral, DP/PT pulses B/L  Dermatologic: skin warm, dry and intact  Lymphatic: cervical LAD  Neurologic: AAOx3; CNII-XII grossly intact; no focal deficits  Psychiatric: affect and characteristics of appearance, verbalizations, behaviors are appropriate

## 2019-09-11 NOTE — ED PROVIDER NOTE - OBJECTIVE STATEMENT
68F nonsmoker, breast ca, anemia noncompliant to iron, c/o 2wk progressively worsening generalized weakness and now 1-2d exterional nonradiating nonpositional nonpleuritic ss chest pressure/dyspnea. colonoscopy >3y ago reportedly wnl. no fever/chills, no abd pain/n/v, no hematochezia/melena, no hematuria.     pcp: freddie 68F nonsmoker, active breast ca, anemia noncompliant to iron, s/p prior hemorrhoidectomy, c/o 2wk progressively worsening generalized weakness and now 1-2d exterional nonradiating nonpositional nonpleuritic ss chest pressure/dyspnea. colonoscopy >3y ago reportedly wnl. dark red stools in setting of high beet juice intake. no fever/chills, no abd pain/n/v, no melena, no hematuria, no prior GIB, no antiplatelet/AC, no etoh, no significant nsaid use.    pcp: aakash

## 2019-09-11 NOTE — H&P ADULT - PROBLEM SELECTOR PLAN 4
F: none  E: replete K <4, Mg <2  N: Regular  GI Ppx:   DVT Ppx: SCDs  Code status: FULL  Dispo: [facility] Plts 567. Appears to be new as CBC from 2017 showing plt count of 300. Likely reactive process due to cancer, possibly 2/2 to a myelodysplastic vs myeloproliferative syndrome.  -Heme/onc consult   -daily CBC Plts 567. Appears to be new as CBC from 2017 showing plt count of 300. Likely reactive process, possibly 2/2 to her h/o iron deficiency vs myelodysplastic vs myeloproliferative syndrome.  -Heme/onc consult   -daily CBC Plts 567. Appears to be new as CBC from 2017 showing plt count of 300. Likely reactive process, possibly 2/2 to her h/o iron deficiency anemia vs myelodysplastic vs myeloproliferative syndrome.  -Heme/onc consult   -daily CBC Plts 567. Appears to be new as CBC from 2017 showing plt count of 300. Likely reactive process, possibly 2/2 to her h/o iron deficiency anemia vs myelodysplastic vs myeloproliferative syndrome.  -Heme/onc consult   -f/u iron studies  -daily CBC

## 2019-09-11 NOTE — PROGRESS NOTE ADULT - SUBJECTIVE AND OBJECTIVE BOX
PT. seen and examined at bed side.    Case discussed with medical team.    Case discussed with Consultants.    Orders reviewed.    Plan:as per orders.    Pt. was wrongly admitted to Hospitalist service.    She is now on my service.     sent Pt. tO THE ED    I am responsible for the H&P not the Hospitalist      [email or web address] in ED

## 2019-09-11 NOTE — ED ADULT NURSE NOTE - OBJECTIVE STATEMENT
67 y/o female hx of R breast CA and anemia c/o hemoglobin of 4. pt c/o weakness, sob and chest pain on exertion. denies use of blood thinners, bloody or black stool, vomiting, nausea, falls. pt a&ox4, ambulatory. 67 y/o female hx of R breast CA and anemia c/o hemoglobin of 4. pt c/o weakness, sob and chest pain on exertion. denies use of blood thinners, bloody or black stool, vomiting, nausea, falls. pt a&ox4, ambulatory. EKG in progress.

## 2019-09-11 NOTE — H&P ADULT - PROBLEM SELECTOR PLAN 3
H/O of R breast cancer dx 2/2018. 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 Patient reports chemotherapy was offered at that time but she declined. Has been doing a holistic approach with supplements and vitamins. Will be starting treatment next week with Dr. Orr, unsure whether chemo and/or radiation.   -F/u on list of supplements and vitamins. She was told to have someone bring a list with names   -Consider Heme/onc consult. R breast enlarged with malodorous discharge and mass. Also reporting mid-upper back pain. Concern for mets. H/O of R breast cancer dx 2/2018. 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 Patient reports chemotherapy was offered at that time but she declined. Has been doing a holistic approach with supplements and vitamins. Will be starting treatment next week with Dr. Orr, unsure whether chemo and/or radiation.   -F/u on list of supplements and vitamins. She was told to have someone bring a list with names   -Heme/onc consult. R breast enlarged with malodorous discharge and mass. Also reporting mid-upper back pain. Concern for mets.

## 2019-09-12 NOTE — PROGRESS NOTE ADULT - PROBLEM SELECTOR PLAN 1
- currently p/w active bleeding coming from medial side of right breast  - initially presented in ED w/ Hgb 3.6, received 1L NS bolus & 2units pRBCs, Hgb improved to 5.6  -of note, pt's temp increased to 100.3F within 15 min of transfusion of additional unit of pRBC.   - transfusion initially d/c'd, since reapproved by blood bank (9/12)  - negative transfusion rxn work-up (9/12)  - elevated LDH, however haptoglobin also elevated therefore low suspicion for hemolysis (9/12)  - pending iron studies, reticulocyte count  - BP stable at 112/61, orthostatics negative (9/12)   - C/w transfusion of pRBC for hgb <7 - currently p/w active bleeding coming from medial side of right breast  - initially presented in ED w/ Hgb 3.6, received 1L NS bolus & 2units pRBCs, Hgb improved to 5.6  -Pt's temp increased to 100.3F within 15 min of transfusion of 3rd unit of pRBC.   -Transfusion was initially d/c'd, since reapproved by blood bank due to negative transfusion rxn work-up  - elevated LDH, however haptoglobin also elevated therefore low suspicion for hemolysis (9/12)  - Pending iron studies, reticulocyte count  - BP stable at 112/61, orthostatics negative (9/12)   - C/w transfusion of additional 2 units pRBC for hgb <7 (4 units total)  - f/u PM hgb - currently p/w active bleeding coming from medial side of right breast  - initially presented in ED w/ Hgb 3.6, received 1L NS bolus & 2units pRBCs, Hgb improved to 5.6  -Pt's temp increased to 100.3F within 15 min of transfusion of 3rd unit of pRBC.   -Transfusion was initially d/c'd, since reapproved by blood bank due to negative transfusion rxn work-up  - elevated LDH, however haptoglobin also elevated therefore low suspicion for hemolysis (9/12)  - Pending iron studies, reticulocyte count  - BP stable at 112/61, orthostatics negative (9/12)   - C/w transfusion of additional 2 units pRBC for hgb <7 (4 units total)  - f/u PM hgb  - f/u results of CT angio chest

## 2019-09-12 NOTE — PROGRESS NOTE ADULT - PROBLEM SELECTOR PLAN 4
Plts 567. Appears to be new as CBC from 2017 showing plt count of 300. Likely reactive process, possibly 2/2 to her h/o iron deficiency anemia vs myelodysplastic vs myeloproliferative syndrome.  -Today, platelets 475 (9/12)  -f/u Heme/onc recs  -f/u iron studies  -daily CBC Plts 567. Appears to be new as CBC from 2017 showing plt count of 300. Likely reactive process, possibly 2/2 to her h/o iron deficiency anemia vs myelodysplastic vs myeloproliferative syndrome.  -Today, platelets 475 (9/12)  - f/u Heme/onc recs  - f/u iron studies  - Daily CBC

## 2019-09-12 NOTE — PROGRESS NOTE ADULT - PROBLEM SELECTOR PLAN 2
-No h/o of GI bleed in past.   -Does report 1 month of loose watery BMs every other day.  - pt endorses occasional dark red BMs which she attributes to drinking beet juice  -remote hx of hemorrhoidectomy   -Colonoscopy 3 years ago negative.   -Rectal exam performed on admission negative for masses, melena, bright red blood. -R breast enlarged with malodorous discharge and mass.  -H/O of R breast cancer dx 2/2018.   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   -pt has never received chemotherapy or radiation. she reports self-management w/ vitamins/supplements.  -Outpatient oncologist Dr. Quintin Barnes following as well as in-house oncology  -Order blood markers for breast cancer CEA 27 & CEA 29  -Order PET scan to follow cancer progression and visualize for mets  -F/u wound care -R breast enlarged with malodorous discharge and mass.  -H/O of R breast cancer dx 2/2018.   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   -Pt has never received chemotherapy or radiation. she reports self-management w/ vitamins/supplements.  -Outpatient oncologist Dr. Quintin Barnes following as well as in-house oncology  -Order blood markers for breast cancer CEA 27 & CEA 29  -Order PET scan to follow cancer progression and visualize for mets  -F/u wound care tomorrow 9/13 -R breast enlarged with malodorous discharge and mass.  -H/O of R breast cancer dx 2/2018.   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   -Pt has never received chemotherapy or radiation. she reports self-management w/ vitamins/supplements.  -Outpatient oncologist Dr. Quintin Barnes following as well as in-house oncology  -Order blood markers for breast cancer CEA 27 & CEA 29  -Plan for PET scan to follow cancer progression and visualize for mets  -F/u wound care tomorrow 9/13

## 2019-09-12 NOTE — CHART NOTE - NSCHARTNOTEFT_GEN_A_CORE
Patient was ordered to receive 2 U PRBC for hgb 5.6, however, 15 minutes into her transfusion her temperature jeana to 100.3 orally, and the transfusion was stopped. A CXR was ordered, blood cultures, new type and screen, LDH, haptoglobin, CBC, CMP, Indirect and Direct bilirubin were ordered. She had complained of chills at the time that the transfusion was stopped. She had no complaints of headache, dizziness, chest pain, palpitations, shortness of breath, nausea, abdominal pain, or weakness. On exam, she appeared comfortable, breathing normally, without tachycardia, S1 and S2. Patient was ordered to receive 2 U PRBC for hgb 5.6, however, 15 minutes into her transfusion her temperature jeana to 100.3 orally, and the transfusion was stopped. A CXR was ordered, blood cultures, new type and screen, LDH, haptoglobin, CBC, CMP, Indirect and Direct bilirubin were ordered. She had complained of chills at the time that the transfusion was stopped. She had no complaints of headache, dizziness, chest pain, palpitations, shortness of breath, nausea, abdominal pain, or weakness. On exam, she appeared comfortable, with normal work of breathing on room air, without tachycardia. The transfusion reaction workup was negative per the blood bank, but Dr. Dodd in the blood bank must approve further transfusions.  We are withholding further blood products until Dr. Dodd approves the results of the workup when he arrives at 7 am.

## 2019-09-12 NOTE — PROGRESS NOTE ADULT - PROBLEM SELECTOR PLAN 5
F: none  E: replete K <4, Mg <2  N: Regular  GI Ppx: Protonix  DVT Ppx: SCDs  Code status: FULL  Dispo: [facility] F: None  E: Replete K <4, Mg <2  N: Regular  GI Ppx: Protonix  DVT Ppx: SCDs  Code status: FULL  Dispo: [facility]

## 2019-09-12 NOTE — PROGRESS NOTE ADULT - ASSESSMENT
Ms. Vasquez is our 68-year old woman w/ hx rt-sided breast cancer (dx 2018), iron-deficiency anemia, mitral valve prolapse, presenting w/ 1-month hx of progressively worsening dyspnea on exertion recently associated w/ mild chest pain in the setting of symptomatic anemia (baseline Hgb ~10).

## 2019-09-12 NOTE — PROGRESS NOTE ADULT - SUBJECTIVE AND OBJECTIVE BOX
Rizwan Willoughbymarissa, MS4   Pager [phone]    SUMMARY:    INTERVAL HOSPITAL COURSE:    SUBJECTIVE/OVERNIGHT EVENTS: Patient seen and examined at bedside.     CONSTITUTIONAL: No fevers, chills, weakness  HEENT: No headache, acute visual changes, throat pain  NECK: No pain or stiffness  RESPIRATORY: No sob, cough, wheezing, hemoptysis  CARDIOVASCULAR: No chest pain or palpitations  GASTROINTESTINAL: No abdominal pain, nausea, vomiting, constipation, or diarrhea  GENITOURINARY: No dysuria, frequency or hematuria  NEUROLOGICAL: No numbness or weakness  SKIN: No rash or itching    OBJECTIVE:    VITAL SIGNS:  ICU Vital Signs Last 24 Hrs  T(C): 37.9 (12 Sep 2019 05:51), Max: 37.9 (12 Sep 2019 05:51)  T(F): 100.3 (12 Sep 2019 05:51), Max: 100.3 (12 Sep 2019 05:51)  HR: 92 (12 Sep 2019 05:51) (70 - 94)  BP: 102/59 (12 Sep 2019 05:51) (93/52 - 120/71)  BP(mean): --  ABP: --  ABP(mean): --  RR: 18 (12 Sep 2019 05:51) (16 - 18)  SpO2: 97% (12 Sep 2019 05:51) (97% - 100%)         @ 07:01  -   @ 07:00  --------------------------------------------------------  IN: 100 mL / OUT: 0 mL / NET: 100 mL              PHYSICAL EXAM:    General: NAD  HEENT: NCAT, PERRL, clear conjunctiva, mmm  Neck: supple, no JVD  Respiratory: CTAB  Cardiovascular: RRR, S1S2, no m/r/g  Abdomen: soft, nontender, nondistended, normal bowel sounds  Extremities: no edema or cyanosis  Skin: normal color and turgor; no rash  Neurological: nonfocal    MEDICATIONS:  MEDICATIONS  (STANDING):  influenza   Vaccine 0.5 milliLiter(s) IntraMuscular once  neomycin/BACItracin/polymyxin Topical Ointment 1 Application(s) Topical every 8 hours  pantoprazole    Tablet 40 milliGRAM(s) Oral before breakfast    MEDICATIONS  (PRN):      ALLERGIES:  Allergies    No Known Allergies    Intolerances        LABS:                        5.8    14.29 )-----------( 475      ( 12 Sep 2019 06:18 )             18.2         135  |  106  |  9   ----------------------------<  91  4.2   |  22  |  0.83    Ca    8.3<L>      12 Sep 2019 06:18  Mg     2.1         TPro  5.9<L>  /  Alb  2.7<L>  /  TBili  0.2  /  DBili  x   /  AST  26  /  ALT  8<L>  /  AlkPhos  83      PT/INR - ( 11 Sep 2019 11:51 )   PT: 13.5 sec;   INR: 1.19          PTT - ( 11 Sep 2019 11:51 )  PTT:31.1 sec  Urinalysis Basic - ( 12 Sep 2019 03:03 )    Color: Yellow / Appearance: Clear / S.010 / pH: x  Gluc: x / Ketone: NEGATIVE  / Bili: Negative / Urobili: 0.2 E.U./dL   Blood: x / Protein: NEGATIVE mg/dL / Nitrite: NEGATIVE   Leuk Esterase: NEGATIVE / RBC: x / WBC x   Sq Epi: x / Non Sq Epi: x / Bacteria: x          RADIOLOGY & ADDITIONAL TESTS: Rizwan Kim, MS4   Pager [phone]    SUMMARY: Ms. Vasquez is a 68-year old woman w/ hx rt-sided breast cancer (dx 2018), iron-deficiency anemia, ilana valve prolapse, presenting w/ 1-month hx of progressively worsening dyspnea on exertion recently associated w/ mild chest pain in the setting of symptomatic anemia (baseline Hgb ~10).    OVERNIGHT EVENTS: Pt transfused 1L NS and 2 units pRBCs in the ED w/ Hgb improving from 3.6 to 5.6. Pt was scheduled to receive additional 2 units of pRBCs, however 15 min after beginning transfusion pt began to experience chills and found to have Temp 100.3F. Transfusion was discontinued and bcx, type & screen, CBC, BMP, hemolytic work-up were all sent.    SUBJECTIVE    CONSTITUTIONAL: No fevers, chills, weakness  HEENT: No headache, acute visual changes, throat pain  NECK: No pain or stiffness  RESPIRATORY: No sob, cough, wheezing, hemoptysis  CARDIOVASCULAR: No chest pain or palpitations  GASTROINTESTINAL: No abdominal pain, nausea, vomiting, constipation, or diarrhea  GENITOURINARY: No dysuria, frequency or hematuria  NEUROLOGICAL: No numbness or weakness  SKIN: No rash or itching    OBJECTIVE:    VITAL SIGNS:  ICU Vital Signs Last 24 Hrs  T(C): 37.9 (12 Sep 2019 05:51), Max: 37.9 (12 Sep 2019 05:51)  T(F): 100.3 (12 Sep 2019 05:51), Max: 100.3 (12 Sep 2019 05:51)  HR: 92 (12 Sep 2019 05:51) (70 - 94)  BP: 102/59 (12 Sep 2019 05:51) (93/52 - 120/71)  BP(mean): --  ABP: --  ABP(mean): --  RR: 18 (12 Sep 2019 05:51) (16 - 18)  SpO2: 97% (12 Sep 2019 05:51) (97% - 100%)         @ 07:01  -   @ 07:00  --------------------------------------------------------  IN: 100 mL / OUT: 0 mL / NET: 100 mL              PHYSICAL EXAM:    General: NAD  HEENT: NCAT, PERRL, clear conjunctiva, mmm  Neck: supple, no JVD  Respiratory: CTAB  Cardiovascular: RRR, S1S2, no m/r/g  Abdomen: soft, nontender, nondistended, normal bowel sounds  Extremities: no edema or cyanosis  Skin: normal color and turgor; no rash  Neurological: nonfocal    MEDICATIONS:  MEDICATIONS  (STANDING):  influenza   Vaccine 0.5 milliLiter(s) IntraMuscular once  neomycin/BACItracin/polymyxin Topical Ointment 1 Application(s) Topical every 8 hours  pantoprazole    Tablet 40 milliGRAM(s) Oral before breakfast    MEDICATIONS  (PRN):      ALLERGIES:  Allergies    No Known Allergies    Intolerances        LABS:                        5.8    14.29 )-----------( 475      ( 12 Sep 2019 06:18 )             18.2         135  |  106  |  9   ----------------------------<  91  4.2   |  22  |  0.83    Ca    8.3<L>      12 Sep 2019 06:18  Mg     2.1         TPro  5.9<L>  /  Alb  2.7<L>  /  TBili  0.2  /  DBili  x   /  AST  26  /  ALT  8<L>  /  AlkPhos  83      PT/INR - ( 11 Sep 2019 11:51 )   PT: 13.5 sec;   INR: 1.19          PTT - ( 11 Sep 2019 11:51 )  PTT:31.1 sec  Urinalysis Basic - ( 12 Sep 2019 03:03 )    Color: Yellow / Appearance: Clear / S.010 / pH: x  Gluc: x / Ketone: NEGATIVE  / Bili: Negative / Urobili: 0.2 E.U./dL   Blood: x / Protein: NEGATIVE mg/dL / Nitrite: NEGATIVE   Leuk Esterase: NEGATIVE / RBC: x / WBC x   Sq Epi: x / Non Sq Epi: x / Bacteria: x          RADIOLOGY & ADDITIONAL TESTS: Rizwan Kim, MS4   Pager [phone]    SUMMARY: Ms. Vasquez is a 68-year old woman w/ hx rt-sided breast cancer (dx 2018), iron-deficiency anemia, ilana valve prolapse, presenting w/ 1-month hx of progressively worsening dyspnea on exertion recently associated w/ mild chest pain in the setting of symptomatic anemia (baseline Hgb ~10).    OVERNIGHT EVENTS: Pt transfused 1L NS and 2 units pRBCs in the ED w/ Hgb improving from 3.6 to 5.6. Pt was scheduled to receive additional 2 units of pRBCs, however 15 min after beginning transfusion pt began to experience chills and found to have Temp 100.3F. Transfusion was discontinued and bcx, type & screen, CBC, BMP, hemolytic work-up were all sent. Blood bank has since granted permission to resume blood transfusion based on negative transfusion reaction work-up.    SUBJECTIVE: Patient seen at bedside just prior to receiving 1 unit of pRBCs. Ms. Vasquez appears to be lying in bed comfortably, however notes active bleeding coming out of the medial side of right breast that is currently covered by gauze and bandages. She endorses feeling light-headed. She denies any fever, chills, dizziness, photophobia, chest pain, palpitations, shortness of breath, nausea or vomiting.    CONSTITUTIONAL: +light-headedness, no fevers, chills, weakness  HEENT: No headache, acute visual changes, throat pain  NECK: No pain or stiffness  RESPIRATORY: No sob, cough, wheezing, hemoptysis  CARDIOVASCULAR: No chest pain or palpitations  GASTROINTESTINAL: No abdominal pain, nausea, vomiting, constipation, or diarrhea  GENITOURINARY: No dysuria, frequency or hematuria  NEUROLOGICAL: No numbness or weakness  SKIN: No rash or itching    OBJECTIVE:    VITAL SIGNS:  ICU Vital Signs Last 24 Hrs  T(C): 37.9 (12 Sep 2019 05:51), Max: 37.9 (12 Sep 2019 05:51)  T(F): 100.3 (12 Sep 2019 05:51), Max: 100.3 (12 Sep 2019 05:51)  HR: 92 (12 Sep 2019 05:51) (70 - 94)  BP: 102/59 (12 Sep 2019 05:51) (93/52 - 120/71)  RR: 18 (12 Sep 2019 05:51) (16 - 18)  SpO2: 97% (12 Sep 2019 05:51) (97% - 100%)         @ 07:01  -   @ 07:00  --------------------------------------------------------  IN: 100 mL / OUT: 0 mL / NET: 100 mL              PHYSICAL EXAM:    General: mild light-headedness, NAD  HEENT: NCAT, PERRL, clear conjunctiva, mmm  Neck: supple, +lymphadenopathy, no JVD  Breast: active bleeding from medial side of right breast covered w/ gauze, left breast WNL  Respiratory: CTAB  Cardiovascular: RRR, S1S2, no m/r/g  Abdomen: soft, nontender, nondistended, normal bowel sounds  Extremities: no edema or cyanosis  Skin: normal color and turgor; no rash  Neurological: nonfocal    MEDICATIONS:  MEDICATIONS  (STANDING):  influenza   Vaccine 0.5 milliLiter(s) IntraMuscular once  neomycin/BACItracin/polymyxin Topical Ointment 1 Application(s) Topical every 8 hours  pantoprazole    Tablet 40 milliGRAM(s) Oral before breakfast    MEDICATIONS  (PRN):      ALLERGIES:  Allergies    No Known Allergies    Intolerances        LABS:                        5.8    14.29 )-----------( 475      ( 12 Sep 2019 06:18 )             18.2         135  |  106  |  9   ----------------------------<  91  4.2   |  22  |  0.83    Ca    8.3<L>      12 Sep 2019 06:18  Mg     2.1         TPro  5.9<L>  /  Alb  2.7<L>  /  TBili  0.2  /  DBili  x   /  AST  26  /  ALT  8<L>  /  AlkPhos  83      PT/INR - ( 11 Sep 2019 11:51 )   PT: 13.5 sec;   INR: 1.19          PTT - ( 11 Sep 2019 11:51 )  PTT:31.1 sec  Urinalysis Basic - ( 12 Sep 2019 03:03 )    Color: Yellow / Appearance: Clear / S.010 / pH: x  Gluc: x / Ketone: NEGATIVE  / Bili: Negative / Urobili: 0.2 E.U./dL   Blood: x / Protein: NEGATIVE mg/dL / Nitrite: NEGATIVE   Leuk Esterase: NEGATIVE / RBC: x / WBC x   Sq Epi: x / Non Sq Epi: x / Bacteria: x          RADIOLOGY & ADDITIONAL TESTS: Rizwan Kim, MS4   Pager [phone]    SUMMARY: Ms. Vasquez is a 68-year old woman w/ hx rt-sided breast cancer (dx 2018), iron-deficiency anemia, ilana valve prolapse, presenting w/ 1-month hx of progressively worsening dyspnea on exertion recently associated w/ mild chest pain in the setting of symptomatic anemia (baseline Hgb ~10).    OVERNIGHT EVENTS: Pt transfused 1L NS and 2 units pRBCs in the ED w/ Hgb improving from 3.6 to 5.6. Pt was scheduled to receive additional 2 units of pRBCs, however 15 min after beginning transfusion pt began to experience chills and found to have Temp 100.3F. Transfusion was discontinued and bcx, type & screen, CBC, BMP, hemolytic work-up were all sent. Blood bank has since granted permission to resume blood transfusion based on negative transfusion reaction work-up.    SUBJECTIVE: Patient seen at bedside just prior to receiving 1 unit of pRBCs. Ms. Vasquez appears to be lying in bed comfortably, however notes active bleeding coming out of the medial side of right breast that is currently covered by gauze and bandages. She endorses feeling light-headed. She denies any fever, chills, dizziness, photophobia, chest pain, palpitations, shortness of breath, nausea or vomiting.    CONSTITUTIONAL: +light-headedness, no fevers, chills, weakness  HEENT: No headache, acute visual changes, throat pain  NECK: No pain or stiffness  RESPIRATORY: No sob, cough, wheezing, hemoptysis  CARDIOVASCULAR: No chest pain or palpitations  GASTROINTESTINAL: No abdominal pain, nausea, vomiting, constipation, or diarrhea  GENITOURINARY: No dysuria, frequency or hematuria  NEUROLOGICAL: No numbness or weakness  SKIN: No rash or itching    OBJECTIVE:    VITAL SIGNS:  ICU Vital Signs Last 24 Hrs  T(C): 37.9 (12 Sep 2019 05:51), Max: 37.9 (12 Sep 2019 05:51)  T(F): 100.3 (12 Sep 2019 05:51), Max: 100.3 (12 Sep 2019 05:51)  HR: 92 (12 Sep 2019 05:51) (70 - 94)  BP: 102/59 (12 Sep 2019 05:51) (93/52 - 120/71)  RR: 18 (12 Sep 2019 05:51) (16 - 18)  SpO2: 97% (12 Sep 2019 05:51) (97% - 100%)         @ 07:01  -   @ 07:00  --------------------------------------------------------  IN: 100 mL / OUT: 0 mL / NET: 100 mL              PHYSICAL EXAM:    General: mild light-headedness, NAD, frail appearing  HEENT: NCAT, PERRL, clear conjunctiva, mmm  Neck: supple, +lymphadenopathy, no JVD  Breast: active bleeding from medial side of right breast covered w/ gauze, left breast WNL  Respiratory: CTAB  Cardiovascular: RRR, S1S2, no m/r/g  Abdomen: soft, nontender, nondistended, normal bowel sounds  Extremities: no edema or cyanosis  Skin: normal color and turgor; no rash  Neurological: nonfocal    MEDICATIONS:  MEDICATIONS  (STANDING):  influenza   Vaccine 0.5 milliLiter(s) IntraMuscular once  neomycin/BACItracin/polymyxin Topical Ointment 1 Application(s) Topical every 8 hours  pantoprazole    Tablet 40 milliGRAM(s) Oral before breakfast    MEDICATIONS  (PRN):      ALLERGIES:  Allergies    No Known Allergies    Intolerances        LABS:                        5.8    14.29 )-----------( 475      ( 12 Sep 2019 06:18 )             18.2         135  |  106  |  9   ----------------------------<  91  4.2   |  22  |  0.83    Ca    8.3<L>      12 Sep 2019 06:18  Mg     2.1         TPro  5.9<L>  /  Alb  2.7<L>  /  TBili  0.2  /  DBili  x   /  AST  26  /  ALT  8<L>  /  AlkPhos  83      PT/INR - ( 11 Sep 2019 11:51 )   PT: 13.5 sec;   INR: 1.19          PTT - ( 11 Sep 2019 11:51 )  PTT:31.1 sec  Urinalysis Basic - ( 12 Sep 2019 03:03 )    Color: Yellow / Appearance: Clear / S.010 / pH: x  Gluc: x / Ketone: NEGATIVE  / Bili: Negative / Urobili: 0.2 E.U./dL   Blood: x / Protein: NEGATIVE mg/dL / Nitrite: NEGATIVE   Leuk Esterase: NEGATIVE / RBC: x / WBC x   Sq Epi: x / Non Sq Epi: x / Bacteria: x          RADIOLOGY & ADDITIONAL TESTS: Reviewed

## 2019-09-12 NOTE — PROGRESS NOTE ADULT - PROBLEM SELECTOR PLAN 3
-R breast enlarged with malodorous discharge and mass.  -H/O of R breast cancer dx 2/2018.   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   -pt has never received chemotherapy or radiation. she reports self-management w/ vitamins/supplements.  -Outpatient oncologist Dr. Quintin Barnes following as well as in-house oncology  -Order blood markers for breast cancer CEA 27 & CEA 29  -Order PET scan to follow cancer progression and visualize for mets  -F/u wound care -No h/o of GI bleed in past.   -Does report 1 month of loose watery BMs every other day.  - pt endorses occasional dark red BMs which she attributes to drinking beet juice  -remote hx of hemorrhoidectomy   -Colonoscopy 3 years ago negative.   -Rectal exam performed on admission negative for masses, melena, bright red blood. - No h/o of GI bleed in past.   - Does report 1 month of loose watery BMs every other day.  - Pt endorses occasional dark red BMs which she attributes to drinking beet juice  - Remote hx of hemorrhoidectomy   - Colonoscopy 3 years ago negative.   - Rectal exam performed on admission negative for masses, melena, bright red blood.

## 2019-09-13 NOTE — PROGRESS NOTE ADULT - PROBLEM SELECTOR PLAN 5
F: None  E: Replete K <4, Mg <2  N: Regular  GI Ppx: Protonix  DVT Ppx: SCDs  Code status: FULL  Dispo: [facility]

## 2019-09-13 NOTE — ADVANCED PRACTICE NURSE CONSULT - RECOMMEDATIONS
Right breast fungating mass - irrigate with Xeroform dressing as primary contact layer to prevent sticking to mass, then Tegaderm, ABD pad and wrap with kerlix daily.  Discussed assessment and recommendations with Alena DELANEY. Right breast fungating mass - irrigate with normal saline, apply Xeroform gauze as primary contact layer to prevent sticking to the mass, then Tegaderm, ABD pad and wrap with ace wrap daily. Apply Surgicel to bleeding areas and Do not remove  Discussed assessment and recommendations with RNAlena and Dr Bacon. Right breast fungating mass - irrigate with normal saline, apply Xeroform gauze as primary contact layer to prevent sticking to the mass, then Telfa dressing, ABD pad and wrap with ace wrap daily. Apply Surgicel to bleeding areas and Do not remove  Discussed assessment and recommendations with RNAlena and Dr Bacon.

## 2019-09-13 NOTE — ADVANCED PRACTICE NURSE CONSULT - ASSESSMENT
Patient presented with right breast malodorous, friable, fungating mass covering entire breast. Fungating mass began to bleed as WOCN was removing dressing; applied Aquacel Extra, bleeding stopped. Purulent exudate also noted. WOCN irrigated mass with norml saline, applied Xeroform dressing as primary contact layer to prevent sticking to mass, then tegaderm, ABD pad and wrapped with kerlix.

## 2019-09-13 NOTE — PROGRESS NOTE ADULT - PROBLEM SELECTOR PLAN 2
-R breast enlarged with malodorous discharge and mass.  -H/O of R breast cancer dx 2/2018.   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   -Pt has never received chemotherapy or radiation. she reports self-management w/ vitamins/supplements.  -Outpatient oncologist Dr. Quintin Barnes following as well as in-house oncology  -Order blood markers for breast cancer CEA 27 & CEA 29  -Plan for PET scan to follow cancer progression and visualize for mets  -F/u wound care tomorrow 9/13 - Followed by outpatient oncologist, Dr. Quintin Barnes  - R breast enlarged with malodorous discharge and mass.  - H/O of R breast cancer dx 2/2018.   - past biopsy showing invasive poorly differentiated ductal carcinoma with areas of necrosis.   - Pt has never received chemotherapy or radiation. she reports self-management w/ vitamins/supplements.  - F/u blood markers for breast cancer CEA 27 & CEA 29  - Plan for PET scan today to follow cancer progression and visualize for mets (9/13)  - Wound care saw pt today, f/u recs (9/13) - Pt's case being followed/managed by outpatient oncologist, Dr. Quintin Barnes  - R breast enlarged with malodorous discharge and mass.  - H/O of R breast cancer dx 2/2018.   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  - F/u blood markers for breast cancer CEA 27 & CEA 29  - Plan for PET scan today to follow cancer progression and visualize for mets (9/13)  - Wound care saw pt today, f/u recs (9/13)  - Evaluated by breast surgery, recommend toilet mastectomy  - Pt currently scheduled to undergo regional chemo perfusion/embolization of the breast mass on 9/24 in AM w/ Dr. Rio Pham  - Biopsied sample from procedure will be sent for cytometric testing by Dr. Emiliano Casey based in [place], [place] - Pt's case being followed/managed by outpatient oncologist, Dr. Quintin Barnes  - R breast enlarged with malodorous discharge and mass.  - H/O of R breast cancer dx 2/2018.   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  - F/u blood markers for breast cancer CEA 27 & CEA 29  - f/u PET scan today to follow cancer progression and visualize for mets (9/13)  - Wound care saw pt today, recommending application of Xeroform gauze as primary contact layer with Telfa dressing, ABD pad and wrap with ace wrap. Surgicel to be applied to bleeding areas if needed.  - Evaluated by breast surgery, recommending toilet mastectomy  - Pt currently scheduled to undergo regional chemo perfusion/embolization of the breast mass on 9/24 in AM w/ Dr. Rio Pham  - Biopsied sample from procedure will be sent for cytometric testing by Dr. Emiliano Casey based in [place], [place]

## 2019-09-13 NOTE — CONSULT NOTE ADULT - ASSESSMENT
Send drainage for culture  Streamline antibiotics to IV Vancomycin and Pip-Tazo (Zosyn) while cultures pending  OK to d/c metronidazole Superinfected large breast cancer      EXAM  Send drainage for culture  Streamline antibiotics to IV Vancomycin and Pip-Tazo (Zosyn) while cultures pending  OK to d/c metronidazole

## 2019-09-13 NOTE — PROGRESS NOTE ADULT - PROBLEM SELECTOR PLAN 1
- currently p/w active bleeding coming from medial side of right breast  - initially presented in ED w/ Hgb 3.6, received 1L NS bolus & 2units pRBCs, Hgb improved to 5.6  -Pt's temp increased to 100.3F within 15 min of transfusion of 3rd unit of pRBC.   -Transfusion was initially d/c'd, since reapproved by blood bank due to negative transfusion rxn work-up  - elevated LDH, however haptoglobin also elevated therefore low suspicion for hemolysis (9/12)  - Pending iron studies, reticulocyte count  - BP stable at 112/61, orthostatics negative (9/12)   - C/w transfusion of additional 2 units pRBC for hgb <7 (4 units total)  - f/u PM hgb  - f/u results of CT angio chest - Initially presented in ED w/ Hgb 3.6, received 1L NS bolus & 2units pRBCs, Hgb improved to 5.6  - Pt's temp increased to 100.3F within 15 min of transfusion of 3rd unit of pRBC (9/12)   - Transfusion was initially d/c'd, since reapproved by blood bank due to negative transfusion rxn work-up  - elevated LDH, however haptoglobin also elevated therefore low suspicion for hemolysis (9/12)  - Iron studies consistent w/ mixed iron deficiency and anemia of chronic disease (low MCV 73.4, normal ferritin, normal TIBC, low total iron 19, low %sat 8%)   - CT angio negative for active bleeding, notable fo lymphadenopathy in b/l axilla (9/12)  - DDx lymphangitic carcinomastosis vs. pulmonary edema, per Radiology interpretation  - reticulocyte index 0.74, indicating hypoproliferation (9/13)  - Hgb improving, today Hgb 7.9 (9/13)  - BP stable, currently 138/87 (9/13)  - C/w transfusion of additional 2 units pRBC for hgb <7 (4 units total) - Initially presented in ED w/ Hgb 3.6, received 1L NS bolus & 2units pRBCs, Hgb improved to 5.6  - Pt's temp increased to 100.3F within 15 min of transfusion of 3rd unit of pRBC (9/12)   - Transfusion was initially d/c'd, since reapproved by blood bank due to negative transfusion rxn work-up  - elevated LDH, however haptoglobin also elevated therefore low suspicion for hemolysis (9/12)  - Iron studies consistent w/ mixed iron deficiency and anemia of chronic disease (low MCV 73.4, normal ferritin, normal TIBC, low total iron 19, low %sat 8%)   - CT angio negative for active bleeding, notable for lymphadenopathy in b/l axilla (9/12)  - DDx lymphangitic carcinomastosis vs. pulmonary edema, per Radiology interpretation  - reticulocyte index 0.74, indicating hypoproliferation (9/13)  - Hgb improving, today Hgb 7.9 (9/13)  - BP stable, currently 138/87 (9/13)  - S/p 4 units pRBCs  - Maintain active T&S

## 2019-09-13 NOTE — CONSULT NOTE ADULT - ASSESSMENT
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  The CT angiogram of the chest demonstrates numerous intercostal, mammary branch of the axillary artery, internal mammary artery feeding this very vascular triple negative breast cancer.  The breast service wants to do a toilet mastectomy.  I would prefer regional chemo perfusion/embolization of the mass itself which will be scheduled for Tuesday morning with Dr. Rio Pham.  A biopsy will be taken at that time for cytometric testing by Dr. Emiliano Casey of the [facility] in [place], [place].  The patient will have a PET CT scan later today.  I have discussed all of this with the patient.

## 2019-09-13 NOTE — CONSULT NOTE ADULT - SUBJECTIVE AND OBJECTIVE BOX
The patient was seen and examined yesterday and today but problems with sunrise prevented my entry of my initial consultation.  The patient has been followed for many years but has long as can be documented from 2012 through 2018 when at the latter date had a biopsy of a 1.3 cm breast mass which was highly undifferentiated 9/9 and was triple negative.  The patient elected to go to alternative therapies but the tumor grew and ultimately pierced the skin and began to bleed recently.  She noted rather rapid growth of the disease not only in her right breast but in the right axilla.  The patient is somewhat of a poor historian.  The patient states that she has no other comorbid disease or history of medical interventions in the past.  Family history is noncontributory.    On physical exam the patient presently has normal vital signs.  Examination of the head and neck reveals mucous membrane and facial pallor.  The patient has poor dental health.  There is no palpable lymphadenopathy or jugular venous distention or palpable thyroid in the neck.  The lungs are clear to percussion and auscultation.  The left axilla and breast are entirely within normal limits.  There is an 8 x 10 cm mass that is relatively fixed in the right axilla.  There is a bloody bandage with at least 2 units of fresh blood in it and a large 4 cm crater at the center of the breast which is oozing blood.  The mass itself measures 16 x 20 cm.  It is not tender.  The patient has mild truncal obesity but there is no palpable or percussible hepatomegaly or splenomegaly.  The patient has no chronic venous stasis changes in the lower extremities.  Neurologically there is no gross upper malady in higher cortical, cerebellar, motor or sensory functions.  The patient has no rash.

## 2019-09-13 NOTE — PROGRESS NOTE ADULT - PROBLEM SELECTOR PLAN 3
- No h/o of GI bleed in past.   - Does report 1 month of loose watery BMs every other day.  - Pt endorses occasional dark red BMs which she attributes to drinking beet juice  - Remote hx of hemorrhoidectomy   - Colonoscopy 3 years ago negative.   - Rectal exam performed on admission negative for masses, melena, bright red blood. - No h/o of GI bleed in past.   - Does report 1 month of loose watery BMs every other day.  - Pt endorses occasional dark red BMs which she attributes to drinking beet juice  - Remote hx of hemorrhoidectomy   - Colonoscopy 3 years ago negative.   - Rectal exam performed on admission negative for masses, melena, bright red blood.  - Continue to trend hgb

## 2019-09-13 NOTE — ADVANCED PRACTICE NURSE CONSULT - REASON FOR CONSULT
[facility] nurse consult to assess right breast malignancy. Patient is a 69yo female with PMH breast cancer (diagnosed in 2018), anemia, MVP presenting with dyspnea and chest pain on exertion and worsening fatigue. About 1 month ago she started feeling weak and tired.

## 2019-09-13 NOTE — CONSULT NOTE ADULT - CONSULT REASON
The patient is referred to the emergency room by Dr. Nathaniel Orr a radiation oncologist with bleeding from the breast without hematocrit of 12.

## 2019-09-13 NOTE — CONSULT NOTE ADULT - ATTENDING COMMENTS
PET CT scan later today.  Biopsy for cytometric testing and regional chemoperfusion/embolization of the lesion on Tuesday morning.  Toilet mastectomy for failure to respond to the above treatment if necessary.
Patient seen and examined and evaluation completed by me in person

## 2019-09-13 NOTE — CONSULT NOTE ADULT - SUBJECTIVE AND OBJECTIVE BOX
HPI:  Patient is a 69yo female with PMH breast cancer (diagnosed in 2018), anemia, MVP presenting with dyspnea and chest pain on exertion and worsening fatigue. About 1 month ago she started feeling weak and tired. 2-3 weeks ago she started getting left sided chest pain and shortness of breath upon exerting herself. Pain and sob come on after walking 10 feet. She went to an Urgent Care on  and they wanted to do an EKG but patient refused. She saw Dr. Orr yesterday who did blood tests. She will be starting treatment for her breast cancer next week (pt unsure whether chemo/radiation). She received a call from him today that her Hgb was 4 and she needed to come into the hospital for a blood transfusion. She reports lightheadedness, dizziness, pain in her mid-upper back, photophobia, nasal congestion, feeling of lump in throat, decreased appetite, 50lb weight loss since 2018. She admits to having loose, watery BMs every other day for the past month. Says her BMs sometimes look dark red because she drinks a lot of beet juice. Last BM today was loose, watery and dark red. She also reports bowel incontinence. She was treated 3 weeks ago with a Z-pack for nasal congestion and throat pain. She has been taking herbal supplements, multivitamins and reports she has been trying to manage her breast cancer holistically. She admits to taking Advil 2-3x per week for R breast pain which helps. She denies headache, fevers, chills, abdominal pain, dysuria, hematuria.     In the ED vs: 97.6, 112/61, 94, 16, 100%   Labs: wbc 11, hgb 3.6, hct 12, plts 567, glu 112, Alb 3, PT 13.5, INR 1.19  Imaging: EKG nsr, CXR   Received: 1L NS bolus, currently receiving 1u pRBCs, another 1u pRBCs ordered   Patient will be admitted to [facility] for further management (11 Sep 2019 15:39)      PAST MEDICAL & SURGICAL HISTORY:  H/O hemorrhoids  Mitral valve prolapse  Anemia  Breast cancer  H/O inguinal hernia repair  H/O umbilical hernia repair  H/O hemorrhoidectomy      REVIEW OF SYSTEMS      General:	    Skin/Breast:  	  Ophthalmologic:  	  ENMT:	    Respiratory and Thorax:  	  Cardiovascular:	    Gastrointestinal:	    Genitourinary:	    Musculoskeletal:	    Neurological:	    Psychiatric:	    Hematology/Lymphatics:	    Endocrine:	    Allergic/Immunologic:	    MEDICATIONS  (STANDING):  enoxaparin Injectable 40 milliGRAM(s) SubCutaneous daily  influenza   Vaccine 0.5 milliLiter(s) IntraMuscular once  neomycin/BACItracin/polymyxin Topical Ointment 1 Application(s) Topical every 8 hours  pantoprazole    Tablet 40 milliGRAM(s) Oral before breakfast  piperacillin/tazobactam IVPB. 3.375 Gram(s) IV Intermittent once  piperacillin/tazobactam IVPB.. 3.375 Gram(s) IV Intermittent every 6 hours  vancomycin  IVPB 1000 milliGRAM(s) IV Intermittent every 12 hours    MEDICATIONS  (PRN):  acetaminophen   Tablet .. 325 milliGRAM(s) Oral every 4 hours PRN Temp greater or equal to 38C (100.4F)      Allergies    No Known Allergies    Intolerances        SOCIAL HISTORY:    FAMILY HISTORY:  FH: hyperlipidemia: brother  FH: hypertension: mother, brother      Vital Signs Last 24 Hrs  T(C): 37.3 (13 Sep 2019 07:21), Max: 38.3 (13 Sep 2019 05:28)  T(F): 99.2 (13 Sep 2019 07:21), Max: 100.9 (13 Sep 2019 05:28)  HR: 85 (13 Sep 2019 07:21) (69 - 86)  BP: 138/67 (13 Sep 2019 05:28) (114/59 - 138/67)  BP(mean): --  RR: 18 (13 Sep 2019 07:21) (18 - 20)  SpO2: 96% (13 Sep 2019 07:21) (96% - 100%)    PHYSICAL EXAM:      Constitutional:    Eyes:    ENMT:    Neck:    Breasts:    Back:    Respiratory:    Cardiovascular:    Gastrointestinal:    Genitourinary:    Rectal:    Extremities:    Vascular:    Neurological:    Skin:    Lymph Nodes:    Musculoskeletal:    Psychiatric:        LABS:                        7.9    13.60 )-----------( 434      ( 13 Sep 2019 08:20 )             24.8     09-    139  |  108  |  10  ----------------------------<  112<H>  3.7   |  23  |  0.89    Ca    8.4      13 Sep 2019 08:20  Mg     2.0     -    TPro  5.9<L>  /  Alb  2.7<L>  /  TBili  0.2  /  DBili  x   /  AST  26  /  ALT  8<L>  /  AlkPhos  83  09-12    PT/INR - ( 12 Sep 2019 12:30 )   PT: 13.9 sec;   INR: 1.22          PTT - ( 12 Sep 2019 12:30 )  PTT:31.5 sec  Urinalysis Basic - ( 12 Sep 2019 03:03 )    Color: Yellow / Appearance: Clear / S.010 / pH: x  Gluc: x / Ketone: NEGATIVE  / Bili: Negative / Urobili: 0.2 E.U./dL   Blood: x / Protein: NEGATIVE mg/dL / Nitrite: NEGATIVE   Leuk Esterase: NEGATIVE / RBC: x / WBC x   Sq Epi: x / Non Sq Epi: x / Bacteria: x        RADIOLOGY & ADDITIONAL STUDIES: HPI:  Patient is a 69yo female with PMH breast cancer (diagnosed in 2018), anemia, MVP presenting with dyspnea and chest pain on exertion and worsening fatigue. About 1 month ago she started feeling weak and tired. 2-3 weeks ago she started getting left sided chest pain and shortness of breath upon exerting herself. Pain and sob come on after walking 10 feet. She went to an Urgent Care on  and they wanted to do an EKG but patient refused. She saw Dr. Orr yesterday who did blood tests. She will be starting treatment for her breast cancer next week (pt unsure whether chemo/radiation). She received a call from him today that her Hgb was 4 and she needed to come into the hospital for a blood transfusion. She reports lightheadedness, dizziness, pain in her mid-upper back, photophobia, nasal congestion, feeling of lump in throat, decreased appetite, 50lb weight loss since 2018. She admits to having loose, watery BMs every other day for the past month. Says her BMs sometimes look dark red because she drinks a lot of beet juice. Last BM today was loose, watery and dark red. She also reports bowel incontinence. She was treated 3 weeks ago with a Z-pack for nasal congestion and throat pain. She has been taking herbal supplements, multivitamins and reports she has been trying to manage her breast cancer holistically. She admits to taking Advil 2-3x per week for R breast pain which helps. She denies headache, fevers, chills, abdominal pain, dysuria, hematuria.       Currently on IV Vancomycin and Metronidazole      In the ED vs: 97.6, 112/61, 94, 16, 100%   Labs: wbc 11, hgb 3.6, hct 12, plts 567, glu 112, Alb 3, PT 13.5, INR 1.19  Imaging: EKG nsr, CXR   Received: 1L NS bolus, currently receiving 1u pRBCs, another 1u pRBCs ordered   Patient will be admitted to [facility] for further management (11 Sep 2019 15:39)      PAST MEDICAL & SURGICAL HISTORY:  H/O hemorrhoids  Mitral valve prolapse  Anemia  Breast cancer  H/O inguinal hernia repair  H/O umbilical hernia repair  H/O hemorrhoidectomy      REVIEW OF SYSTEMS  Otherwise negative      MEDICATIONS  (STANDING):  enoxaparin Injectable 40 milliGRAM(s) SubCutaneous daily  influenza   Vaccine 0.5 milliLiter(s) IntraMuscular once  neomycin/BACItracin/polymyxin Topical Ointment 1 Application(s) Topical every 8 hours  pantoprazole    Tablet 40 milliGRAM(s) Oral before breakfast  piperacillin/tazobactam IVPB. 3.375 Gram(s) IV Intermittent once  piperacillin/tazobactam IVPB.. 3.375 Gram(s) IV Intermittent every 6 hours  vancomycin  IVPB 1000 milliGRAM(s) IV Intermittent every 12 hours    MEDICATIONS  (PRN):  acetaminophen   Tablet .. 325 milliGRAM(s) Oral every 4 hours PRN Temp greater or equal to 38C (100.4F)      Allergies    No Known Allergies      SOCIAL HISTORY:  Lives at home    FAMILY HISTORY:  FH: hyperlipidemia: brother  FH: hypertension: mother, brother      Vital Signs Last 24 Hrs  T(C): 37.3 (13 Sep 2019 07:21), Max: 38.3 (13 Sep 2019 05:28)  T(F): 99.2 (13 Sep 2019 07:21), Max: 100.9 (13 Sep 2019 05:28)  HR: 85 (13 Sep 2019 07:21) (69 - 86)  BP: 138/67 (13 Sep 2019 05:28) (114/59 - 138/67)  BP(mean): --  RR: 18 (13 Sep 2019 07:21) (18 - 20)  SpO2: 96% (13 Sep 2019 07:21) (96% - 100%)  Awake and alert  Pale conjunctiva  No oral lesions  RRR  Chest CTA  Abd soft ND NT  Right breast with exophytic mass with purulent malodorous drainage  LEs no edema      LABS:                        7.9    13.60 )-----------( 434      ( 13 Sep 2019 08:20 )             24.8         139  |  108  |  10  ----------------------------<  112<H>  3.7   |  23  |  0.89    Ca    8.4      13 Sep 2019 08:20  Mg     2.0     13    TPro  5.9<L>  /  Alb  2.7<L>  /  TBili  0.2  /  DBili  x   /  AST  26  /  ALT  8<L>  /  AlkPhos  83  12    PT/INR - ( 12 Sep 2019 12:30 )   PT: 13.9 sec;   INR: 1.22          PTT - ( 12 Sep 2019 12:30 )  PTT:31.5 sec  Urinalysis Basic - ( 12 Sep 2019 03:03 )    Color: Yellow / Appearance: Clear / S.010 / pH: x  Gluc: x / Ketone: NEGATIVE  / Bili: Negative / Urobili: 0.2 E.U./dL   Blood: x / Protein: NEGATIVE mg/dL / Nitrite: NEGATIVE   Leuk Esterase: NEGATIVE / RBC: x / WBC x   Sq Epi: x / Non Sq Epi: x / Bacteria: x        RADIOLOGY & ADDITIONAL STUDIES:    < from: CT Angio Chest w/ IV Cont (19 @ 17:56) >  EXAM:  CT ANGIO CHEST (W)AW IC                          PROCEDURE DATE:  2019          INTERPRETATION:    CTA (CT angiography) of the CHEST    INDICATION: Breast cancer with bleeding wound. Anemia with hemoglobin of   5.8.    TECHNIQUE: CTA (CT angiography) of the chest was performed, with images   obtained both 25 seconds and 90 seconds following the administration of   intravenous contrast material. Image post-processing was performed   including the production of axial, coronal, and sagittal reformatted   images and coronal and sagittal maximum intensity projections (MIPs) of   the chest.     PRIOR STUDIES: No prior CT scan available for comparison.    FINDINGS:    There is a 14.2 x 6.7 x 16.0 cm cavitary mass arising from the right   breast, consistent with breast cancer. No active bleeding identified.   There is gas present within the mass, raising suspicion for infection.   Infiltration of the subcutaneous fat in the right breast and right axilla   with skin thickening rightbreast also present. Edema present within soft   tissues of right axilla. Large right axillary lymphadenopathy, with some   of the lymph nodes necrotic. Largest lymph node measures 6.2 x 4.0 cm.   The right breast mass and right axillary adenopathy are hypervascular,   with innumerable feeding arteries and draining veins. The arteries arise   from intercostal branches of the right internal mammary artery as well as   from branches of the right subclavian and axillary arteries. Additional   arterial branches extending inferiorly from the neck, with their origins   not identified on this exam. There are a few 0.8 cm right subpectoral   lymph nodes. There is also mild left axillary lymphadenopathy, with the   largest lymph node measuring 1.1 cm.In the right internal mammary chain   there are two 0.5 cm lymph nodes.    There is smooth interlobular septal thickening in the upp    There is smooth interlobular septal thickening in the upper lobes   bilaterally. Trace bilateral pleural effusions are present with   associated compressive atelectasis of small portions of each lower lobe.   A 0.4 cm diffusely calcified, benign-appearing nodule is present in the   right lower lobe.    Heart size within normal limits. No pericardial effusion. Small calcified   plaque aorta.    No mediastinal or hilar lymphadenopathy.    Images of the upper abdomen are unremarkable.    No suspicious bone lesion.        IMPRESSION:  1. No active bleeding identified.    2. There is a 16.0 cm cavitary mass in the right breast, consistent with   breast cancer. Gas is present within the mass, raising suspicion for   infection.    3. Large lymphadenopathy right axilla with mild adenopathy in right   subpectoral region, right internal mammary chain, and left axilla.    4. Smooth interlobular septal thickening in each upperlobe. The   differential diagnosis includes lymphangitic carcinomatosis and pulmonary   edema.    5. Trace bilateral pleural effusions.

## 2019-09-13 NOTE — PROGRESS NOTE ADULT - SUBJECTIVE AND OBJECTIVE BOX
OVERNIGHT EVENTS:    SUBJECTIVE / INTERVAL HPI: Patient seen and examined at bedside.     VITAL SIGNS:  Vital Signs Last 24 Hrs  T(C): 37.3 (13 Sep 2019 07:21), Max: 38.3 (13 Sep 2019 05:28)  T(F): 99.2 (13 Sep 2019 07:21), Max: 100.9 (13 Sep 2019 05:28)  HR: 85 (13 Sep 2019 07:21) (68 - 86)  BP: 138/67 (13 Sep 2019 05:28) (95/58 - 138/67)  BP(mean): --  RR: 18 (13 Sep 2019 07:21) (18 - 20)  SpO2: 96% (13 Sep 2019 07:21) (96% - 100%)    PHYSICAL EXAM:    General: WDWN  HEENT: NC/AT; PERRL, anicteric sclera; MMM  Neck: supple  Cardiovascular: +S1/S2, RRR  Respiratory: CTA B/L; no W/R/R  Gastrointestinal: soft, NT/ND; +BSx4  Extremities: WWP; no edema, clubbing or cyanosis  Vascular: 2+ radial, DP/PT pulses B/L  Neurological: AAOx3; no focal deficits    MEDICATIONS:  MEDICATIONS  (STANDING):  enoxaparin Injectable 40 milliGRAM(s) SubCutaneous daily  influenza   Vaccine 0.5 milliLiter(s) IntraMuscular once  metroNIDAZOLE  IVPB 500 milliGRAM(s) IV Intermittent every 8 hours  neomycin/BACItracin/polymyxin Topical Ointment 1 Application(s) Topical every 8 hours  pantoprazole    Tablet 40 milliGRAM(s) Oral before breakfast  potassium chloride    Tablet ER 40 milliEquivalent(s) Oral once  vancomycin  IVPB 1000 milliGRAM(s) IV Intermittent every 12 hours    MEDICATIONS  (PRN):  acetaminophen   Tablet .. 325 milliGRAM(s) Oral every 4 hours PRN Temp greater or equal to 38C (100.4F)      ALLERGIES:  Allergies    No Known Allergies    Intolerances        LABS:                        7.9    13.60 )-----------( 434      ( 13 Sep 2019 08:20 )             24.8         139  |  108  |  10  ----------------------------<  112<H>  3.7   |  23  |  0.89    Ca    8.4      13 Sep 2019 08:20  Mg     2.0         TPro  5.9<L>  /  Alb  2.7<L>  /  TBili  0.2  /  DBili  x   /  AST  26  /  ALT  8<L>  /  AlkPhos  83      PT/INR - ( 12 Sep 2019 12:30 )   PT: 13.9 sec;   INR: 1.22          PTT - ( 12 Sep 2019 12:30 )  PTT:31.5 sec  Urinalysis Basic - ( 12 Sep 2019 03:03 )    Color: Yellow / Appearance: Clear / S.010 / pH: x  Gluc: x / Ketone: NEGATIVE  / Bili: Negative / Urobili: 0.2 E.U./dL   Blood: x / Protein: NEGATIVE mg/dL / Nitrite: NEGATIVE   Leuk Esterase: NEGATIVE / RBC: x / WBC x   Sq Epi: x / Non Sq Epi: x / Bacteria: x      CAPILLARY BLOOD GLUCOSE          RADIOLOGY & ADDITIONAL TESTS: Reviewed. Rizwan Kim, MS4   Pager [phone]    SUMMARY:    SUBJECTIVE/OVERNIGHT EVENTS: Patient seen and examined at bedside. No acute overnight events. Ms. Vasquez was seen this morning lying in bed and in no apparent distress.    CONSTITUTIONAL: No fevers, chills, weakness  HEENT: No headache, acute visual changes, throat pain  NECK: No pain or stiffness  RESPIRATORY: No sob, cough, wheezing, hemoptysis  CARDIOVASCULAR: No chest pain or palpitations  GASTROINTESTINAL: No abdominal pain, nausea, vomiting, constipation, or diarrhea  GENITOURINARY: No dysuria, frequency or hematuria  NEUROLOGICAL: No numbness or weakness  SKIN: No rash or itching    OBJECTIVE:    VITAL SIGNS:  ICU Vital Signs Last 24 Hrs  T(C): 37.3 (13 Sep 2019 07:21), Max: 38.3 (13 Sep 2019 05:28)  T(F): 99.2 (13 Sep 2019 07:21), Max: 100.9 (13 Sep 2019 05:28)  HR: 85 (13 Sep 2019 07:21) (69 - 86)  BP: 138/67 (13 Sep 2019 05:28) (114/59 - 138/67)  RR: 18 (13 Sep 2019 07:21) (18 - 20)  SpO2: 96% (13 Sep 2019 07:21) (96% - 100%)         @ 07:01  -   @ 07:00  --------------------------------------------------------  IN: 600 mL / OUT: 200 mL / NET: 400 mL        POCT Blood Glucose.: 107 mg/dL (13 Sep 2019 12:04)      PHYSICAL EXAM:    General: NAD  HEENT: NCAT, PERRL, clear conjunctiva, mmm  Neck: supple, no JVD  Respiratory: CTAB  Cardiovascular: RRR, S1S2, no m/r/g  Abdomen: soft, nontender, nondistended, normal bowel sounds  Extremities: no edema or cyanosis  Skin: normal color and turgor; no rash  Neurological: nonfocal    MEDICATIONS:  MEDICATIONS  (STANDING):  enoxaparin Injectable 40 milliGRAM(s) SubCutaneous daily  influenza   Vaccine 0.5 milliLiter(s) IntraMuscular once  metroNIDAZOLE  IVPB 500 milliGRAM(s) IV Intermittent every 8 hours  neomycin/BACItracin/polymyxin Topical Ointment 1 Application(s) Topical every 8 hours  pantoprazole    Tablet 40 milliGRAM(s) Oral before breakfast  vancomycin  IVPB 1000 milliGRAM(s) IV Intermittent every 12 hours    MEDICATIONS  (PRN):  acetaminophen   Tablet .. 325 milliGRAM(s) Oral every 4 hours PRN Temp greater or equal to 38C (100.4F)      ALLERGIES:  Allergies    No Known Allergies    Intolerances        LABS:                        7.9    13.60 )-----------( 434      ( 13 Sep 2019 08:20 )             24.8     09-13    139  |  108  |  10  ----------------------------<  112<H>  3.7   |  23  |  0.89    Ca    8.4      13 Sep 2019 08:20  Mg     2.0         TPro  5.9<L>  /  Alb  2.7<L>  /  TBili  0.2  /  DBili  x   /  AST  26  /  ALT  8<L>  /  AlkPhos  83  09-12    PT/INR - ( 12 Sep 2019 12:30 )   PT: 13.9 sec;   INR: 1.22          PTT - ( 12 Sep 2019 12:30 )  PTT:31.5 sec  Urinalysis Basic - ( 12 Sep 2019 03:03 )    Color: Yellow / Appearance: Clear / S.010 / pH: x  Gluc: x / Ketone: NEGATIVE  / Bili: Negative / Urobili: 0.2 E.U./dL   Blood: x / Protein: NEGATIVE mg/dL / Nitrite: NEGATIVE   Leuk Esterase: NEGATIVE / RBC: x / WBC x   Sq Epi: x / Non Sq Epi: x / Bacteria: x          RADIOLOGY & ADDITIONAL TESTS: Rizwan Kim, MS4   Pager [phone]    SUMMARY:    SUBJECTIVE/OVERNIGHT EVENTS: Patient seen and examined at bedside. No acute overnight events. Ms. Vasquez was seen this morning lying in bed and in no apparent distress.    CONSTITUTIONAL: No fevers, chills, weakness  HEENT: No headache, acute visual changes, throat pain  NECK: No pain or stiffness  RESPIRATORY: No sob, cough, wheezing, hemoptysis  CARDIOVASCULAR: No chest pain or palpitations  GASTROINTESTINAL: No abdominal pain, nausea, vomiting, constipation, or diarrhea  GENITOURINARY: No dysuria, frequency or hematuria  NEUROLOGICAL: No numbness or weakness  SKIN: No rash or itching    OBJECTIVE:    VITAL SIGNS:  ICU Vital Signs Last 24 Hrs  T(C): 37.3 (13 Sep 2019 07:21), Max: 38.3 (13 Sep 2019 05:28)  T(F): 99.2 (13 Sep 2019 07:21), Max: 100.9 (13 Sep 2019 05:28)  HR: 85 (13 Sep 2019 07:21) (69 - 86)  BP: 138/67 (13 Sep 2019 05:28) (114/59 - 138/67)  RR: 18 (13 Sep 2019 07:21) (18 - 20)  SpO2: 96% (13 Sep 2019 07:21) (96% - 100%)         @ 07:01  -   @ 07:00  --------------------------------------------------------  IN: 600 mL / OUT: 200 mL / NET: 400 mL        POCT Blood Glucose.: 107 mg/dL (13 Sep 2019 12:04)      PHYSICAL EXAM:    General: NAD, mild truncal obesity  HEENT: NCAT, PERRL, poor dentition, mmm  Neck: supple, no JVD  Breast: left breast WNL, right breast showing mass w/ blood oozing from central crater  Respiratory: CTAB  Cardiovascular: RRR, S1S2, no m/r/g  Abdomen: soft, nontender, nondistended, normal bowel sounds  Extremities: no edema or cyanosis  Skin: normal color and turgor; no rash  Neurological: nonfocal    MEDICATIONS:  MEDICATIONS  (STANDING):  enoxaparin Injectable 40 milliGRAM(s) SubCutaneous daily  influenza   Vaccine 0.5 milliLiter(s) IntraMuscular once  metroNIDAZOLE  IVPB 500 milliGRAM(s) IV Intermittent every 8 hours  neomycin/BACItracin/polymyxin Topical Ointment 1 Application(s) Topical every 8 hours  pantoprazole    Tablet 40 milliGRAM(s) Oral before breakfast  vancomycin  IVPB 1000 milliGRAM(s) IV Intermittent every 12 hours    MEDICATIONS  (PRN):  acetaminophen   Tablet .. 325 milliGRAM(s) Oral every 4 hours PRN Temp greater or equal to 38C (100.4F)      ALLERGIES:  Allergies    No Known Allergies    Intolerances        LABS:                        7.9    13.60 )-----------( 434      ( 13 Sep 2019 08:20 )             24.8     09-13    139  |  108  |  10  ----------------------------<  112<H>  3.7   |  23  |  0.89    Ca    8.4      13 Sep 2019 08:20  Mg     2.0         TPro  5.9<L>  /  Alb  2.7<L>  /  TBili  0.2  /  DBili  x   /  AST  26  /  ALT  8<L>  /  AlkPhos  83  09-12    PT/INR - ( 12 Sep 2019 12:30 )   PT: 13.9 sec;   INR: 1.22          PTT - ( 12 Sep 2019 12:30 )  PTT:31.5 sec  Urinalysis Basic - ( 12 Sep 2019 03:03 )    Color: Yellow / Appearance: Clear / S.010 / pH: x  Gluc: x / Ketone: NEGATIVE  / Bili: Negative / Urobili: 0.2 E.U./dL   Blood: x / Protein: NEGATIVE mg/dL / Nitrite: NEGATIVE   Leuk Esterase: NEGATIVE / RBC: x / WBC x   Sq Epi: x / Non Sq Epi: x / Bacteria: x          RADIOLOGY & ADDITIONAL TESTS: Rizwan Kim, MS4   Pager [phone]    ON events:  This AM pt spiked fever to 100.9F. No tachycardia, hypotension. WBC 13.60 from 13.44 yesterday. Started on vanc + metronidazole.    SUBJECTIVE/OVERNIGHT EVENTS: Patient seen and examined at bedside. No acute overnight events. Ms. Vasquez was seen this morning lying in bed and in no apparent distress.    CONSTITUTIONAL: No fevers, chills, weakness  HEENT: No headache, acute visual changes, throat pain  NECK: No pain or stiffness  RESPIRATORY: No sob, cough, wheezing, hemoptysis  CARDIOVASCULAR: No chest pain or palpitations  GASTROINTESTINAL: No abdominal pain, nausea, vomiting, constipation, or diarrhea  GENITOURINARY: No dysuria, frequency or hematuria  NEUROLOGICAL: No numbness or weakness  SKIN: No rash or itching    OBJECTIVE:    VITAL SIGNS:  ICU Vital Signs Last 24 Hrs  T(C): 37.3 (13 Sep 2019 07:21), Max: 38.3 (13 Sep 2019 05:28)  T(F): 99.2 (13 Sep 2019 07:21), Max: 100.9 (13 Sep 2019 05:28)  HR: 85 (13 Sep 2019 07:21) (69 - 86)  BP: 138/67 (13 Sep 2019 05:28) (114/59 - 138/67)  RR: 18 (13 Sep 2019 07:21) (18 - 20)  SpO2: 96% (13 Sep 2019 07:21) (96% - 100%)        12 @ 07:01  -  09-13 @ 07:00  --------------------------------------------------------  IN: 600 mL / OUT: 200 mL / NET: 400 mL        POCT Blood Glucose.: 107 mg/dL (13 Sep 2019 12:04)      PHYSICAL EXAM:    General: NAD, mild truncal obesity  HEENT: NCAT, PERRL, poor dentition, mmm  Neck: supple, no JVD  Breast: left breast WNL, right breast showing large fungating mass w/ blood oozing from central crater  Respiratory: CTAB  Cardiovascular: RRR, S1S2, no m/r/g  Abdomen: soft, nontender, nondistended, normal bowel sounds  Extremities: no edema or cyanosis  Skin: normal color and turgor; no rash  Neurological: nonfocal    MEDICATIONS:  MEDICATIONS  (STANDING):  enoxaparin Injectable 40 milliGRAM(s) SubCutaneous daily  influenza   Vaccine 0.5 milliLiter(s) IntraMuscular once  metroNIDAZOLE  IVPB 500 milliGRAM(s) IV Intermittent every 8 hours  neomycin/BACItracin/polymyxin Topical Ointment 1 Application(s) Topical every 8 hours  pantoprazole    Tablet 40 milliGRAM(s) Oral before breakfast  vancomycin  IVPB 1000 milliGRAM(s) IV Intermittent every 12 hours    MEDICATIONS  (PRN):  acetaminophen   Tablet .. 325 milliGRAM(s) Oral every 4 hours PRN Temp greater or equal to 38C (100.4F)      ALLERGIES:  Allergies    No Known Allergies    Intolerances        LABS:                        7.9    13.60 )-----------( 434      ( 13 Sep 2019 08:20 )             24.8     09-13    139  |  108  |  10  ----------------------------<  112<H>  3.7   |  23  |  0.89    Ca    8.4      13 Sep 2019 08:20  Mg     2.0     -13    TPro  5.9<L>  /  Alb  2.7<L>  /  TBili  0.2  /  DBili  x   /  AST  26  /  ALT  8<L>  /  AlkPhos  83  09-12    PT/INR - ( 12 Sep 2019 12:30 )   PT: 13.9 sec;   INR: 1.22          PTT - ( 12 Sep 2019 12:30 )  PTT:31.5 sec  Urinalysis Basic - ( 12 Sep 2019 03:03 )    Color: Yellow / Appearance: Clear / S.010 / pH: x  Gluc: x / Ketone: NEGATIVE  / Bili: Negative / Urobili: 0.2 E.U./dL   Blood: x / Protein: NEGATIVE mg/dL / Nitrite: NEGATIVE   Leuk Esterase: NEGATIVE / RBC: x / WBC x   Sq Epi: x / Non Sq Epi: x / Bacteria: x          RADIOLOGY & ADDITIONAL TESTS:

## 2019-09-13 NOTE — CONSULT NOTE ADULT - NSHPATTENDINGPLANDISCUSS_GEN_ALL_CORE
The patient, the house staff, Dr. Rio Pham and Dr. Tressa Serrano, the latter by telephone.
the house staf

## 2019-09-13 NOTE — PROGRESS NOTE ADULT - PROBLEM SELECTOR PLAN 4
Plts 567. Appears to be new as CBC from 2017 showing plt count of 300. Likely reactive process, possibly 2/2 to her h/o iron deficiency anemia vs myelodysplastic vs myeloproliferative syndrome.  -Today, platelets 475 (9/12)  - f/u Heme/onc recs  - f/u iron studies  - Daily CBC - Plts 567 on admission. CBC in 2017 showing plt count 300,Likely reactive process, possibly 2/2 to her h/o iron deficiency anemia vs myelodysplastic vs myeloproliferative syndrome  - reticulocyte index 0.74, indicating hypoproliferation (9/13)   - Plts have since been stable, today plts 434 (9/13)  - C/w daily CBC to trend Hgb and platelets

## 2019-09-14 NOTE — PROGRESS NOTE ADULT - PROBLEM SELECTOR PLAN 3
Given profound anemia, patient assessed for GIB on admission, no evidence found  - Pt endorses occasional dark red BM, however she attributes this to consumption of beets  - C-scope performed in 2016 revealed no suspicious findings  - Rectal exam performed on admission negative for masses, melena, bright red blood.

## 2019-09-14 NOTE — PROGRESS NOTE ADULT - SUBJECTIVE AND OBJECTIVE BOX
coverage for Dr Jung    c/o constipation  no BM since Tuesday    REVIEW OF SYSTEMS:  Constitutional: No fever, weight loss or fatigue  Cardiovascular: No chest pain, palpitations, dizziness or leg swelling  Gastrointestinal: No abdominal or epigastric pain. No nausea, vomiting or hematemesis; +constipation.  Skin: No itching, burning, rashes or lesions       MEDICATIONS:  MEDICATIONS  (STANDING):  enoxaparin Injectable 40 milliGRAM(s) SubCutaneous daily  influenza   Vaccine 0.5 milliLiter(s) IntraMuscular once  neomycin/BACItracin/polymyxin Topical Ointment 1 Application(s) Topical every 8 hours  pantoprazole    Tablet 40 milliGRAM(s) Oral before breakfast  piperacillin/tazobactam IVPB.. 3.375 Gram(s) IV Intermittent every 6 hours  vancomycin  IVPB 1000 milliGRAM(s) IV Intermittent every 12 hours    MEDICATIONS  (PRN):  acetaminophen   Tablet .. 325 milliGRAM(s) Oral every 4 hours PRN Temp greater or equal to 38C (100.4F)      Allergies    No Known Allergies    Intolerances        Vital Signs Last 24 Hrs  T(C): 36.6 (14 Sep 2019 05:24), Max: 38.1 (13 Sep 2019 22:05)  T(F): 97.9 (14 Sep 2019 05:24), Max: 100.5 (13 Sep 2019 22:05)  HR: 72 (14 Sep 2019 05:24) (72 - 88)  BP: 100/61 (14 Sep 2019 05:24) (100/61 - 131/76)  BP(mean): --  RR: 19 (14 Sep 2019 05:24) (16 - 19)  SpO2: 97% (14 Sep 2019 05:24) (97% - 99%)    09-13 @ 07:01  -  09-14 @ 07:00  --------------------------------------------------------  IN: 450 mL / OUT: 0 mL / NET: 450 mL        PHYSICAL EXAM:    General: ; in no acute distress  HEENT: MMM, conjunctiva and sclera clear  Lungs: clear  Heart: regular  Gastrointestinal: Soft non-tender non-distended; Normal bowel sounds;   Skin: Warm and dry. No obvious rash    LABS:      CBC Full  -  ( 14 Sep 2019 06:42 )  WBC Count : 11.09 K/uL  RBC Count : 3.14 M/uL  Hemoglobin : 7.8 g/dL  Hematocrit : 25.0 %  Platelet Count - Automated : 414 K/uL  Mean Cell Volume : 79.6 fl  Mean Cell Hemoglobin : 24.8 pg  Mean Cell Hemoglobin Concentration : 31.2 gm/dL  Auto Neutrophil # : 7.62 K/uL  Auto Lymphocyte # : 1.65 K/uL  Auto Monocyte # : 1.02 K/uL  Auto Eosinophil # : 0.67 K/uL  Auto Basophil # : 0.08 K/uL  Auto Neutrophil % : 68.7 %  Auto Lymphocyte % : 14.9 %  Auto Monocyte % : 9.2 %  Auto Eosinophil % : 6.0 %  Auto Basophil % : 0.7 %    09-14    139  |  108  |  9   ----------------------------<  103<H>  4.1   |  23  |  0.91    Ca    8.4      14 Sep 2019 06:42  Mg     2.1     09-14      PT/INR - ( 12 Sep 2019 12:30 )   PT: 13.9 sec;   INR: 1.22          PTT - ( 12 Sep 2019 12:30 )  PTT:31.5 sec                  RADIOLOGY & ADDITIONAL STUDIES (The following images were personally reviewed):

## 2019-09-14 NOTE — PROGRESS NOTE ADULT - ASSESSMENT
Continue IV Vancomycin  Continue Pip-Tazo - increase dose to 4.5 gm IV q 6 hours  Debridement / resection of infected tissue - understood this need to be coordinated in accordance with the  tumor surgery plans Right breast fungating tumor creating exophytic growth superinfected by malodorous gloria  GP and GN gloria including Pseudomonas      RECOMMEND  Continue IV Vancomycin  Continue Pip-Tazo - increase dose to 4.5 gm IV q 6 hours  Debridement / resection of infected tissue - understood this need to be coordinated in accordance with the  tumor surgery plans

## 2019-09-14 NOTE — PROGRESS NOTE ADULT - PROBLEM SELECTOR PLAN 5
F: None  E: Replete K <4, Mg <2  N: Regular  GI Ppx: Protonix  DVT Ppx: SCDs  Code status: FULL  Dispo: [facility]

## 2019-09-14 NOTE — PROGRESS NOTE ADULT - PROBLEM SELECTOR PLAN 1
Pt received 4 units pRBC, hgb today 7.8  - Patient currently hemodynamically stable, HR 79, Bp 131/76 at time of exam  - Denies syncope, palpitations, or chest pain  - Will continue to trend Hgb with CBC tomorrow  - Plan to obtain additional type and screen with AM labs tomorrow Pt received 4 units pRBC, hgb today 7.8  - Patient currently hemodynamically stable, HR 79, Bp 131/76 at time of exam  - Denies syncope, palpitations, or chest pain  - Culture growing pseudomonas, as per ID recs zosyn dose increased to 4.5 g q6hrs  - Will continue to trend Hgb with CBC tomorrow  - Plan to obtain additional type and screen with AM labs tomorrow

## 2019-09-14 NOTE — PROGRESS NOTE ADULT - PROBLEM SELECTOR PLAN 4
- Plts 567 on admission. CBC in 2017 showing plt count 300,Likely reactive process, possibly 2/2 to her h/o iron deficiency anemia vs myelodysplastic vs myeloproliferative syndrome  - reticulocyte index 0.74, indicating hypoproliferation (9/13)   - Plts have since been stable, today plts 434 (9/13)  - C/w daily CBC to trend Hgb and platelets

## 2019-09-14 NOTE — PROGRESS NOTE ADULT - ASSESSMENT
Ms. Vasquez is our 68-year old woman w/ hx rt-sided breast cancer (dx 2018), iron-deficiency anemia, mitral valve prolapse, presenting w/ 1-month hx of progressively worsening dyspnea on exertion recently associated w/ mild chest pain in the setting of symptomatic anemia (baseline Hgb ~10). Mild fever overnight with T 100.5

## 2019-09-14 NOTE — PROGRESS NOTE ADULT - SUBJECTIVE AND OBJECTIVE BOX
INTERVAL HPI/OVERNIGHT EVENTS:    ANTIBIOTICS/RELEVANT:    MEDICATIONS  (STANDING):  docusate sodium 100 milliGRAM(s) Oral three times a day  enoxaparin Injectable 40 milliGRAM(s) SubCutaneous daily  influenza   Vaccine 0.5 milliLiter(s) IntraMuscular once  neomycin/BACItracin/polymyxin Topical Ointment 1 Application(s) Topical every 8 hours  pantoprazole    Tablet 40 milliGRAM(s) Oral before breakfast  piperacillin/tazobactam IVPB.. 3.375 Gram(s) IV Intermittent every 6 hours  vancomycin  IVPB 1000 milliGRAM(s) IV Intermittent every 12 hours    MEDICATIONS  (PRN):  acetaminophen   Tablet .. 325 milliGRAM(s) Oral every 4 hours PRN Temp greater or equal to 38C (100.4F)      Allergies    No Known Allergies    Intolerances        Vital Signs Last 24 Hrs  T(C): 37.4 (14 Sep 2019 13:41), Max: 38.1 (13 Sep 2019 22:05)  T(F): 99.3 (14 Sep 2019 13:41), Max: 100.5 (13 Sep 2019 22:05)  HR: 82 (14 Sep 2019 13:41) (72 - 88)  BP: 103/55 (14 Sep 2019 13:41) (100/61 - 131/76)  BP(mean): --  RR: 16 (14 Sep 2019 13:41) (16 - 19)  SpO2: 96% (14 Sep 2019 13:41) (96% - 99%)          LABS:                        7.8    11.09 )-----------( 414      ( 14 Sep 2019 06:42 )             25.0     09-14    139  |  108  |  9   ----------------------------<  103<H>  4.1   |  23  |  0.91    Ca    8.4      14 Sep 2019 06:42  Mg     2.1     09-14            MICROBIOLOGY:      Culture - Other (09.13.19 @ 10:32)    Gram Stain:   Rare WBC's  Numerous Gram Negative Rods  Numerous Gram positive cocci in pairs    Specimen Source: .Other Right breast wound culture    Culture Results:   Numerous Pseudomonas aeruginosa  Numerous Gram Positive Cocci  Numerous Escherichia coli  Culture in progress    Culture - Blood (09.13.19 @ 08:16)    Specimen Source: .Blood Blood-Arterial    Culture Results:   No growth at 1 day.    Culture - Blood (09.13.19 @ 08:16)    Specimen Source: .Blood Blood-Arterial    Culture Results:   No growth at 1 day.    Culture - Urine (09.12.19 @ 08:38)    Specimen Source: .Urine None    Culture Results:   Less than 10,000 cols/cc; Insignificant amount of growth.        RADIOLOGY & ADDITIONAL STUDIES: INTERVAL HPI/OVERNIGHT EVENTS:    Clinically stable    MEDICATIONS  (STANDING):  docusate sodium 100 milliGRAM(s) Oral three times a day  enoxaparin Injectable 40 milliGRAM(s) SubCutaneous daily  influenza   Vaccine 0.5 milliLiter(s) IntraMuscular once  neomycin/BACItracin/polymyxin Topical Ointment 1 Application(s) Topical every 8 hours  pantoprazole    Tablet 40 milliGRAM(s) Oral before breakfast  piperacillin/tazobactam IVPB.. 3.375 Gram(s) IV Intermittent every 6 hours  vancomycin  IVPB 1000 milliGRAM(s) IV Intermittent every 12 hours    MEDICATIONS  (PRN):  acetaminophen   Tablet .. 325 milliGRAM(s) Oral every 4 hours PRN Temp greater or equal to 38C (100.4F)      Allergies    No Known Allergies      EXAM  Vital Signs Last 24 Hrs  T(C): 37.4 (14 Sep 2019 13:41), Max: 38.1 (13 Sep 2019 22:05)  T(F): 99.3 (14 Sep 2019 13:41), Max: 100.5 (13 Sep 2019 22:05)  HR: 82 (14 Sep 2019 13:41) (72 - 88)  BP: 103/55 (14 Sep 2019 13:41) (100/61 - 131/76)  BP(mean): --  RR: 16 (14 Sep 2019 13:41) (16 - 19)  SpO2: 96% (14 Sep 2019 13:41) (96% - 99%)  Awake and alert  Depressed affect  No rash  Otherwise unchanged        LABS:                        7.8    11.09 )-----------( 414      ( 14 Sep 2019 06:42 )             25.0     09-14    139  |  108  |  9   ----------------------------<  103<H>  4.1   |  23  |  0.91    Ca    8.4      14 Sep 2019 06:42  Mg     2.1     09-14            MICROBIOLOGY:      Culture - Other (09.13.19 @ 10:32)    Gram Stain:   Rare WBC's  Numerous Gram Negative Rods  Numerous Gram positive cocci in pairs    Specimen Source: .Other Right breast wound culture    Culture Results:   Numerous Pseudomonas aeruginosa  Numerous Gram Positive Cocci  Numerous Escherichia coli  Culture in progress    Culture - Blood (09.13.19 @ 08:16)    Specimen Source: .Blood Blood-Arterial    Culture Results:   No growth at 1 day.    Culture - Blood (09.13.19 @ 08:16)    Specimen Source: .Blood Blood-Arterial    Culture Results:   No growth at 1 day.    Culture - Urine (09.12.19 @ 08:38)    Specimen Source: .Urine None    Culture Results:   Less than 10,000 cols/cc; Insignificant amount of growth.

## 2019-09-14 NOTE — PROGRESS NOTE ADULT - PROBLEM SELECTOR PLAN 2
- Pt's case being followed/managed by outpatient oncologist, Dr. Quintin Barnes  - R breast enlarged with malodorous discharge and mass.  - H/O of R breast cancer dx 2/2018.   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  - F/u blood markers for breast cancer CEA 27 & CEA 29  - f/u PET scan today to follow cancer progression and visualize for mets (9/13)  - Appreciate wound care recomendations  - Evaluated by breast surgery, recommending toilet mastectomy  - Pt currently scheduled to undergo regional chemo perfusion/embolization of the breast mass on 9/24 in AM w/ Dr. Rio Pham  - Biopsied sample from procedure will be sent for cytometric testing by Dr. Emiliano Casey based in [place], [place]

## 2019-09-14 NOTE — PROGRESS NOTE ADULT - SUBJECTIVE AND OBJECTIVE BOX
OVERNIGHT EVENTS: Mild fever with T max of 100.5    SUBJECTIVE / INTERVAL HPI: Patient seen and examined at bedside. In no acute distress, reports pain in right breast which is managable with current therapy. Patient denies additional complaints. Denies syncope, shortness of breath, chest pain or any GI bleeding    VITAL SIGNS:  Vital Signs Last 24 Hrs  T(C): 36.6 (14 Sep 2019 05:24), Max: 38.1 (13 Sep 2019 22:05)  T(F): 97.9 (14 Sep 2019 05:24), Max: 100.5 (13 Sep 2019 22:05)  HR: 72 (14 Sep 2019 05:24) (72 - 88)  BP: 100/61 (14 Sep 2019 05:24) (100/61 - 131/76)  RR: 19 (14 Sep 2019 05:24) (16 - 19)  SpO2: 97% (14 Sep 2019 05:24) (97% - 99%)    PHYSICAL EXAM:    General: WDWN  HEENT: NC/AT; PERRL, MMM  Neck: supple, no JVD or thyromegaly   Cardiovascular: +S1/S2; RRR  Respiratory: CTA B/L; no W/R/R  Gastrointestinal: soft, NT/ND; +BSx4  Extremities: WWP; no edema, clubbing or cyanosis  Vascular: 2+ radial, DP/PT pulses B/L  Neurological: AAOx3; no focal deficits  Skin: Mass over right breast with malodorous discharge, covered by dressing    MEDICATIONS:  MEDICATIONS  (STANDING):  enoxaparin Injectable 40 milliGRAM(s) SubCutaneous daily  influenza   Vaccine 0.5 milliLiter(s) IntraMuscular once  neomycin/BACItracin/polymyxin Topical Ointment 1 Application(s) Topical every 8 hours  pantoprazole    Tablet 40 milliGRAM(s) Oral before breakfast  piperacillin/tazobactam IVPB.. 3.375 Gram(s) IV Intermittent every 6 hours  vancomycin  IVPB 1000 milliGRAM(s) IV Intermittent every 12 hours    MEDICATIONS  (PRN):  acetaminophen   Tablet .. 325 milliGRAM(s) Oral every 4 hours PRN Temp greater or equal to 38C (100.4F)      ALLERGIES:  Allergies    No Known Allergies    Intolerances        LABS:                        7.8    11.09 )-----------( 414      ( 14 Sep 2019 06:42 )             25.0     09-14    139  |  108  |  9   ----------------------------<  103<H>  4.1   |  23  |  0.91    Ca    8.4      14 Sep 2019 06:42  Mg     2.1     09-14      PT/INR - ( 12 Sep 2019 12:30 )   PT: 13.9 sec;   INR: 1.22          PTT - ( 12 Sep 2019 12:30 )  PTT:31.5 sec    CAPILLARY BLOOD GLUCOSE      POCT Blood Glucose.: 107 mg/dL (13 Sep 2019 12:04)      RADIOLOGY & ADDITIONAL TESTS: Reviewed.

## 2019-09-15 NOTE — PROGRESS NOTE ADULT - SUBJECTIVE AND OBJECTIVE BOX
coverage for Dr Jung    Pt seen and examined   c/o breast pain R    REVIEW OF SYSTEMS:  Constitutional: No fever,   Cardiovascular: No chest pain, palpitations, dizziness or leg swelling  Gastrointestinal: No abdominal or epigastric pain. No nausea, nor vomiting ; No diarrhea   Skin: No itching, burning, rashes or lesions       MEDICATIONS:  MEDICATIONS  (STANDING):  docusate sodium 100 milliGRAM(s) Oral three times a day  enoxaparin Injectable 40 milliGRAM(s) SubCutaneous daily  influenza   Vaccine 0.5 milliLiter(s) IntraMuscular once  neomycin/BACItracin/polymyxin Topical Ointment 1 Application(s) Topical every 8 hours  pantoprazole    Tablet 40 milliGRAM(s) Oral before breakfast  piperacillin/tazobactam IVPB.. 4.5 Gram(s) IV Intermittent every 6 hours  vancomycin  IVPB 1000 milliGRAM(s) IV Intermittent every 12 hours    MEDICATIONS  (PRN):  acetaminophen   Tablet .. 325 milliGRAM(s) Oral every 4 hours PRN Temp greater or equal to 38C (100.4F), Moderate Pain (4 - 6), Severe Pain (7 - 10)      Allergies    No Known Allergies    Intolerances        Vital Signs Last 24 Hrs  T(C): 37.1 (15 Sep 2019 05:07), Max: 37.4 (14 Sep 2019 13:41)  T(F): 98.7 (15 Sep 2019 05:07), Max: 99.3 (14 Sep 2019 13:41)  HR: 77 (15 Sep 2019 05:07) (77 - 82)  BP: 102/59 (15 Sep 2019 05:07) (102/59 - 103/55)  BP(mean): --  RR: 17 (15 Sep 2019 05:07) (16 - 17)  SpO2: 97% (15 Sep 2019 05:07) (96% - 97%)    09-14 @ 07:01  -  09-15 @ 07:00  --------------------------------------------------------  IN: 450 mL / OUT: 0 mL / NET: 450 mL        PHYSICAL EXAM:    General:  in no acute distress  HEENT: MMM, conjunctiva and sclera clear  Lungs: clear  Heart: regular  Breast: dressings and drain  Gastrointestinal: Soft non-tender non-distended; Normal bowel sounds;   Skin: Warm and dry. No obvious rash    LABS:      CBC Full  -  ( 15 Sep 2019 07:27 )  WBC Count : 9.06 K/uL  RBC Count : 3.19 M/uL  Hemoglobin : 7.8 g/dL  Hematocrit : 25.6 %  Platelet Count - Automated : 455 K/uL  Mean Cell Volume : 80.3 fl  Mean Cell Hemoglobin : 24.5 pg  Mean Cell Hemoglobin Concentration : 30.5 gm/dL  Auto Neutrophil # : x  Auto Lymphocyte # : x  Auto Monocyte # : x  Auto Eosinophil # : x  Auto Basophil # : x  Auto Neutrophil % : x  Auto Lymphocyte % : x  Auto Monocyte % : x  Auto Eosinophil % : x  Auto Basophil % : x    09-15    138  |  107  |  9   ----------------------------<  105<H>  4.0   |  23  |  0.97    Ca    8.4      15 Sep 2019 07:27  Mg     2.0     09-15                        RADIOLOGY & ADDITIONAL STUDIES (The following images were personally reviewed):

## 2019-09-15 NOTE — PROVIDER CONTACT NOTE (OTHER) - SITUATION
Pt c/o of dizziness while showering this morning. Pt also c/o of constipation and pain in right breast.

## 2019-09-16 NOTE — DIETITIAN INITIAL EVALUATION ADULT. - PROBLEM SELECTOR PLAN 1
Presenting with worsening weakness, fatigue, chest pain/dyspnea on exertion for the past month. Dr. Orr called pt today after seeing her in office yesterday saying her hgb was 4 and she needs to come to hospital for a blood transfusion. H/o of iron deficiency anemia in past. Previously took iron, last time about 5 years ago. Had a colonoscopy 3 years ago which was normal, does not remember name of GI doc in [place]. 1 month of loose watery BMs every other day. Reports BMs are sometimes dark red in color due to th beet juice she drinks. Admits to taking Advil 2-3x per week (2 tabs at a time) for R breast pain. Says that her R breast mass does bleed. Thinks her baseline Hgb is 10. Previous cbc from 2017 showing hgb 10.7. Hgb today 3.6. Received 1L NS bolus in ED. Likely 2/2 to iron deficiency vs GI bleed vs bleeding R breast mass.   -currently receiving 1u pRBCs, another 1u pRBCs ordered   -f/u 10pm cbc  -f/u iron studies, reticulocyte count  -monitor for signs/symptoms of bleed  -orthostatics negative   -BP stable at 100/61    -Heme/onc consult  -Consider GI consult in AM if no improvement in Hgb or reports melena/red blood per rectum  -Transfuse for hgb <7  -CBC AM

## 2019-09-16 NOTE — PROGRESS NOTE ADULT - SUBJECTIVE AND OBJECTIVE BOX
The patient feels generally comfortable although constipated.  The patient has had no bleeding from the heavily wrapped right breast bandage.    On physical exam the patient presently has normal vital signs.  Examination of the head and neck reveals mucous membrane and facial pallor.  The patient has poor dental health.  There is no palpable lymphadenopathy or jugular venous distention or palpable thyroid in the neck.  The lungs are clear to percussion and auscultation.  The left axilla and breast are entirely within normal limits.  There is an 8 x 10 cm mass that is relatively fixed in the right axilla.  There is a bloody bandage with at least 2 units of fresh blood in it and a large 4 cm crater at the center of the breast which is oozing blood.  The mass itself measures 16 x 20 cm.  It is not tender.  The patient has mild truncal obesity but there is no palpable or percussible hepatomegaly or splenomegaly.  The patient has no chronic venous stasis changes in the lower extremities.  Neurologically there is no gross upper malady in higher cortical, cerebellar, motor or sensory functions.  The patient has no rash.

## 2019-09-16 NOTE — PHYSICAL THERAPY INITIAL EVALUATION ADULT - GENERAL OBSERVATIONS, REHAB EVAL
as per nichole DELANEY, Dr. Bacon medicine intern patient is cleared for PT/OOB. received supine + IV, call bell, in no apparent distress.

## 2019-09-16 NOTE — PROGRESS NOTE ADULT - ASSESSMENT
Pt is a 69 yo F w/ hx rt-sided breast cancer (dx 2018), iron-deficiency anemia, mitral valve prolapse, presenting w/ 1-month hx of progressively worsening dyspnea on exertion recently associated w/ chest tightness in the setting of symptomatic anemia (baseline Hgb ~10)

## 2019-09-16 NOTE — DIETITIAN INITIAL EVALUATION ADULT. - ENERGY NEEDS
Ht: 66in Wt: 158.2lbs IBW: 130lbs (+/-10%), 122%IBW, BMI: 25.5 kg/m2   IBW (59.1 kg) used for calculations as pt >120% of IBW  Needs adjusted for cancer, older age, malnutrition.

## 2019-09-16 NOTE — PROGRESS NOTE ADULT - ASSESSMENT
The patient tomorrow will have an aspiration biopsy of the large axillary mass as a breast biopsy would be likely to bleed externally.  This was discussed with Dr. Tressa Serrano.  This will be done under anesthesia in Dr. Rio Pham's operating room on 11 [address]. The patient tomorrow will be transferred to [facility] for systemic chemotherapy On Wednesday.

## 2019-09-16 NOTE — PROGRESS NOTE ADULT - SUBJECTIVE AND OBJECTIVE BOX
68y old  Female who presents with a chief complaint of Symptomatic Anemia   	  MEDICATIONS:    piperacillin/tazobactam IVPB.. 4.5 Gram(s) IV Intermittent every 6 hours  vancomycin  IVPB 1000 milliGRAM(s) IV Intermittent every 12 hours      acetaminophen   Tablet .. 325 milliGRAM(s) Oral every 4 hours PRN  oxyCODONE    5 mG/acetaminophen 325 mG 1 Tablet(s) Oral every 6 hours PRN    docusate sodium 100 milliGRAM(s) Oral three times a day  pantoprazole    Tablet 40 milliGRAM(s) Oral before breakfast  polyethylene glycol 3350 17 Gram(s) Oral daily  senna 1 Tablet(s) Oral daily      enoxaparin Injectable 40 milliGRAM(s) SubCutaneous daily  influenza   Vaccine 0.5 milliLiter(s) IntraMuscular once  neomycin/BACItracin/polymyxin Topical Ointment 1 Application(s) Topical every 8 hours      Complaint: Overnight, pt had shortness of breath, chest tightness, and light headedness while walking to bathroom, similar to her symptoms prior to admission. Symptoms resolved when patient returned to bed.    Otherwise 12 point review of systems is normal.    PHYSICAL EXAM:  Constitutional: NAD  Eyes: PERRL, EOMI, sclera non-icteric  Neck: supple, no masses, no JVD  Respiratory: CTA b/l, good air entry b/l, no wheezing, rhonchi, rales, with normal respiratory effort and no intercostal retractions  Cardiovascular: RRR, normal S1S2, no M/R/G  Gastrointestinal: soft, NTND, no masses palpable, BS normal in all four quadrants,   Extremities:  no c/c/e  Neurological: AAOx3  Breast: Right breast                      8.0    10.04 )-----------( 435      ( 16 Sep 2019 05:43 )             25.9     09-16    139  |  106  |  10  ----------------------------<  95  4.2   |  24  |  0.91    Ca    8.8      16 Sep 2019 05:43  Mg     1.9     09-16    ASSESSMENT/PLAN: 	  Pt is a 69 yo F w/ hx rt-sided breast cancer (dx 2018), iron-deficiency anemia, mitral valve prolapse, presenting w/ 1-month hx of progressively worsening dyspnea on exertion recently associated w/ chest tightness in the setting of symptomatic anemia (baseline Hgb ~10)      Problem/Plan - 1:  ·  Problem: Anemia.  Plan: Anemia improved, Hgb 8.0  - Pt currently hemodynamically stable w/ HR 82, /64  - Denies syncope, palpitations, or chest pain  - Continue to trend Hgb with CBC.     Problem/Plan - 2:  ·  Problem: Breast cancer.  Plan: - Pt's case being followed/managed by outpatient oncologist, Dr. Quintin Barnes  - R breast enlarged with malodorous discharge and mass  - Continue zosyn 4.5mg q6h and vancomycin 1g q12h  - f/u 6pm vanc trough  - H/O of R breast cancer dx 2/2018  - 2012 biopsy of 1.3 cm breast mass found to be highly undifferentiated 9/9 ductal carcinoma, and triple negative  - F/u blood markers for breast cancer CEA 27 & CEA 29  - PET CT: right breast mass corresponding to known cancer, lymph nodes suspicious for metastatic disease, uptake in small right pleural effusion that may be due to infection/inflammation related to neoplasm  - Pt currently scheduled to undergo regional chemo perfusion/embolization of the breast mass    Biopsied sample from procedure will be sent for cytometric testing by Dr. Emiliano Casey based in [place], [place].     Problem/Plan - 3:  ·  Problem: R/O GI bleed.  Plan: Given profound anemia, patient assessed for GIB on admission, no evidence found  - Pt endorses occasional dark red BM, however she attributes this to consumption of beets  - C-scope performed in 2016 revealed no suspicious findings  - Rectal exam performed on admission negative for masses, melena, bright red blood.     Problem/Plan - 4:  ·  Problem: R/O Thrombocytosis.  Plan: - Plts 567 on admission. CBC in 2017 showing plt count 300, likely reactive process, possibly 2/2 to her h/o iron deficiency anemia vs myelodysplastic vs myeloproliferative syndrome  - Reticulocyte index 0.74, indicating hypoproliferation  - Plts have since been stable, today plts 435  - C/w daily CBC to trend Hgb and platelets.     Problem/Plan - 5:  ·  Problem: Nutrition, metabolism, and development symptoms.  Plan: F: None  E: Replete K <4, Mg <2  N: Regular  GI Ppx: Protonix  DVT Ppx: SCDs  Code status: FULL  Dispo: [facility].       Problem/Plan - 6:  Problem: Transition of care performed with sharing of clinical summary. Plan: 1) PCP Contacted on Admission: (Y/N) --> Name & Phone #: Dr. Orr [phone]  2) Date of Contact with PCP:  3) PCP Contacted at Discharge: (Y/N, N/A)  4) Summary of Handoff Given to PCP:   5) Post-Discharge Appointment Date and Location:

## 2019-09-16 NOTE — DIETITIAN INITIAL EVALUATION ADULT. - PROBLEM SELECTOR PLAN 2
No h/o of GI bleed in past. Does have a history of hemorrhoids. Colonoscopy 3 years ago negative. Hgb today 3.6. Possibly a slow subacute GI bleed vs iron deficiency anemia. Rectal exam negative for masses, melena, bright red blood. Does report 1 month of loose watery BMs every other day. Reports BMs are sometimes dark red in color due to the beet juice she drinks.   -Management as per above

## 2019-09-16 NOTE — PROGRESS NOTE ADULT - PROBLEM SELECTOR PLAN 5
F: None  E: Replete K <4, Mg <2  N: Regular  GI Ppx: Protonix  DVT Ppx: SCDs  Code status: FULL  Dispo: [facility]

## 2019-09-16 NOTE — DIETITIAN INITIAL EVALUATION ADULT. - OTHER INFO
68 y.o F with PMHx including breast cancer (dx in 2018), anemia, MVP p/w dyspnea and chest pain on exertion and worsening fatigue. She is being admitted for symptomatic anemia. Given profound anemia, patient assessed for GIB on admission, no evidence found. Per ID, found to have right breast fungating tumor creating exophytic growth superinfected by malodorous gloria GP and GN gloria including Pseudomonas. On abx. Pending plans for debridement/resection of infected tissue. Pt currently scheduled to undergo regional chemo perfusion/embolization of the breast mass on 9/24. Per HPI, pt reportedly on multiple herbal supplements, multivitamins, bowel incontinence, and reports she has been trying to manage her breast cancer holistically.     Pt seen resting in bed w/ low-energy. Pt endorses poor appetite and intake x 1 month PTA and attributes to sickness. Pt reports preparing food for self, eating mostly salad greens and avoiding sugar as she reports was told "sugar feeds Cancer"(?). Pt reports taking B complex, multivitamins, probiotics, vitamin, and multiple herbal supplements PTA, however, unable to provide list of herbal supplements at this time. Confirms NKFA. Pt reports UBW of 205lbs and suspects wt loss of ~50lbs since 11/2018. Pt now noted weighs 158lbs; 23% wt loss x 10 months noted. Nutrition focused physical exam conducted and findings suggestive of moderate muscle/fat loss. See below. Suspecting malnutrition based on nutrition focused physical exam, significant wt loss, decreased PO intake x 1 month. Pt endorses fair appetite, tolerating current diet order with ~50% PO intake for meals. No pain reported at this time. Denies N/V/D but reports feeling constipated; Pt on multiple bowel regimen noted. +BM 9/15. Declined prune juice at this time. Skin: Wound noted on R breast. Intact pressure wise. Encouraged PO intake and offered ONS. Pt declined Ensure Enlive 2/2 high sugar content but amenable to other ONS with lower sugar content for additional kcal/protein intake. Made aware RD remains available. 68 y.o F with PMHx including breast cancer (dx in 2018), anemia, MVP p/w dyspnea and chest pain on exertion and worsening fatigue. She is being admitted for symptomatic anemia. Given profound anemia, patient assessed for GIB on admission, no evidence found. Per ID, found to have right breast fungating tumor creating exophytic growth superinfected by malodorous gloria GP and GN gloria including Pseudomonas. On abx. Pending plans for debridement/resection of infected tissue. Pt currently scheduled to undergo regional chemo perfusion/embolization of the breast mass on 9/24. Per HPI, pt reportedly on multiple herbal supplements, multivitamins, bowel incontinence, and reports she has been trying to manage her breast cancer holistically.     Pt seen resting in bed w/ low-energy. Pt endorses poor appetite and intake x 1 month PTA and attributes to sickness. Pt reports preparing food for self, eating mostly salad greens and avoiding sugar as she reports was told "sugar feeds Cancer"(?). Pt reports taking B complex, multivitamins, probiotics, vitamin, and multiple herbal supplements PTA, however, unable to provide list of herbal supplements at this time. Confirms NKFA. Pt reports UBW of 205lbs and suspects wt loss of ~50lbs since 11/2018. Pt now noted weighs 158lbs; 23% wt loss x 10 months noted. Nutrition focused physical exam conducted and findings suggestive of moderate muscle/fat loss. See below. Suspecting malnutrition based on nutrition focused physical exam, significant wt loss, decreased PO intake x 1 month. Pt endorses fair appetite, tolerating current diet order with ~50% PO intake for meals. No pain reported at this time. Denies N/V/D but reports feeling constipated; Pt on multiple bowel regimen noted. +BM 9/15. Declined prune juice at this time. Skin: Wound noted on R breast. Intact pressure wise. Encouraged PO intake and discussed importance of adequate nutrition. Offered ONS. Pt declined Ensure Enlive 2/2 high sugar content but amenable to other ONS with lower sugar content for additional kcal/protein intake. Made aware RD remains available.

## 2019-09-16 NOTE — DIETITIAN INITIAL EVALUATION ADULT. - PROBLEM SELECTOR PLAN 5
F: none  E: replete K <4, Mg <2  N: Regular  GI Ppx: Protonix  DVT Ppx: SCDs  Code status: FULL  Dispo: [facility]

## 2019-09-16 NOTE — PROGRESS NOTE ADULT - ASSESSMENT
Right breast cancer appears long-standing with a fungating and superinfected with polymicrobial gloria mass.  Involved bacteria including Pseudomonas and Staph aureus.  Awaiting sensitivity testing      RECOMMEND  Continue IV Vancomycin and Pip-Tazo  Offer psychosocial support and Psychiatry consult   Duration of antimicrobial therapy linked to plans for cancer treatment.  Ideally surgey/physical removal would shorten the treatment

## 2019-09-16 NOTE — PROGRESS NOTE ADULT - PROBLEM SELECTOR PLAN 6
1) PCP Contacted on Admission: (Y/N) --> Name & Phone #: Dr. Orr [phone]  2) Date of Contact with PCP:  3) PCP Contacted at Discharge: (Y/N, N/A)  4) Summary of Handoff Given to PCP:   5) Post-Discharge Appointment Date and Location:
1) PCP Contacted on Admission: (Y/N) --> Name & Phone #: Dr. Orr [phone]  2) Date of Contact with PCP:  3) PCP Contacted at Discharge: (Y/N, N/A)  4) Summary of Handoff Given to PCP:   5) Post-Discharge Appointment Date and Location:
1) PCP Contacted on Admission: (Y/N) --> Name & Phone #: Dr. Orr [phone]  2) Date of Contact with PCP:  3) PCP Contacted at Discharge: (Y/N, N/A)  4) Summary of Handoff Given to PCP:   5) Post-Discharge Appointment Date and Location:
1) PCP Contacted on Admission: (Y/N) --> Name & Phone #: Dr. Orr [phone]  2) Date of Contact with PCP:  3) PCP Contacted at Discharge: (Y/N, N/A)  4) Summary of Handoff Given to PCP:   5) Post-Discharge Appointment Date and Location:

## 2019-09-16 NOTE — DIETITIAN INITIAL EVALUATION ADULT. - ADD RECOMMEND
1. Recommend continue with regular diet. Recommend adding Orgain 3 x daily (provides 660 kcal, 48 g protein).2. Encourage PO intake. 3. [place] pt's food preferences. 4. Monitor wt trends. 5. Recommend adding multivitamin.

## 2019-09-16 NOTE — PROGRESS NOTE ADULT - PROBLEM SELECTOR PLAN 2
- Pt's case being followed/managed by outpatient oncologist, Dr. Quintin Barnes  - R breast enlarged with malodorous discharge and mass  - Continue zosyn 4.5mg q6h and vancomycin 1g q12h  - f/u 6pm vanc trough  - H/O of R breast cancer dx 2/2018  - 2012 biopsy of 1.3 cm breast mass found to be highly undifferentiated 9/9 ductal carcinoma, and triple negative  - F/u blood markers for breast cancer CEA 27 & CEA 29  - PET CT: right breast mass corresponding to known cancer, lymph nodes suspicious for metastatic disease, uptake in small right pleural effusion that may be due to infection/inflammation related to neoplasm  - Pt currently scheduled to undergo regional chemo perfusion/embolization of the breast mass tomorrow (9/17) in AM w/ Dr. Rio Pham  - Biopsied sample from procedure will be sent for cytometric testing by Dr. Emiliano Casey based in [place], [place]

## 2019-09-16 NOTE — PROGRESS NOTE ADULT - SUBJECTIVE AND OBJECTIVE BOX
INTERVAL HPI/OVERNIGHT EVENTS:    No clinical changes  Per house staff, patient scheduled for chemo for tomorrow    MEDICATIONS  (STANDING):  docusate sodium 100 milliGRAM(s) Oral three times a day  enoxaparin Injectable 40 milliGRAM(s) SubCutaneous daily  influenza   Vaccine 0.5 milliLiter(s) IntraMuscular once  neomycin/BACItracin/polymyxin Topical Ointment 1 Application(s) Topical every 8 hours  pantoprazole    Tablet 40 milliGRAM(s) Oral before breakfast  piperacillin/tazobactam IVPB.. 4.5 Gram(s) IV Intermittent every 6 hours  polyethylene glycol 3350 17 Gram(s) Oral daily  senna 1 Tablet(s) Oral daily  vancomycin  IVPB 1000 milliGRAM(s) IV Intermittent every 12 hours    MEDICATIONS  (PRN):  acetaminophen   Tablet .. 325 milliGRAM(s) Oral every 4 hours PRN Temp greater or equal to 38C (100.4F), Moderate Pain (4 - 6), Severe Pain (7 - 10)  oxyCODONE    5 mG/acetaminophen 325 mG 1 Tablet(s) Oral every 6 hours PRN Moderate Pain (4 - 6)      Allergies    No Known Allergies    EXAM  Vital Signs Last 24 Hrs  T(C): 37.2 (16 Sep 2019 11:49), Max: 37.4 (15 Sep 2019 21:00)  T(F): 99 (16 Sep 2019 11:49), Max: 99.4 (15 Sep 2019 21:00)  HR: 76 (16 Sep 2019 11:49) (76 - 87)  BP: 101/65 (16 Sep 2019 11:49) (99/62 - 108/67)  BP(mean): --  RR: 17 (16 Sep 2019 11:49) (17 - 19)  SpO2: 97% (16 Sep 2019 11:49) (96% - 98%)  Awake and alert but with very flat affect and not responding to questions although appears to hear them.  First asks what "chemo" is but appears not to want to listen to the answer and states she does not want it and wants her cancer treated with her radiation therapy MD, not here.  Right breast area with fungating mass and drainage  Otherwise unchanged        LABS:                        8.0    10.04 )-----------( 435      ( 16 Sep 2019 05:43 )             25.9     09-16    139  |  106  |  10  ----------------------------<  95  4.2   |  24  |  0.91    Ca    8.8      16 Sep 2019 05:43  Mg     1.9     09-16            MICROBIOLOGY:    Culture - Urine (09.15.19 @ 19:20)    Specimen Source: .Urine Clean Catch (Midstream)    Culture Results:   No growth    Culture - Other (09.13.19 @ 10:32)    Gram Stain:   Rare WBC's  Numerous Gram Negative Rods  Numerous Gram positive cocci in pairs    Specimen Source: .Other Right breast wound culture    Culture Results:   Numerous Pseudomonas aeruginosa  Numerous Streptococcus anginosus  Numerous Escherichia coli  Numerous Staphylococcus aureus  Numerous Corynebacterium striatum group  Numerous Arthrobacter species  More than three types of organisms recovered may indicate colonization  and/or contamination.  Please call Microbiology immediately if identification and/or  suceptibility is indicated.    ------------------------------------      NM PET/CT Onc FDG Skull to Thigh, Inital (09.13.19 @ 15:44) >  EXAM:  PETCT [facility] ONC FDG INIT                          PROCEDURE DATE:  09/13/2019          INTERPRETATION:  PET-CT Scan    History: Right breast invasive ductal carcinoma. Positive lymph nodes.   Anemia of 3.6. Baseline study to help determine initial treatment   strategy.       Procedure: Following at least 4 hour fasting, the patient's blood glucose   was 107 mg/dl.  The patient was injected with 10.5 mCi of F-18-FDG.     After resting in a quiet room for about one hour, the patient was  positioned on the scanning table and images were obtained using standard   PET/CT technique from the mid thigh to the axilla.  This acquisition was   performed with the patient's arms lifted as high as possible over the   head.    A second PET/CT acquisition of the head and neck was then performed with   the patient's arms at the side.    Simultaneous low-dose CT scanning is used for attenuation correction and   localization.    PET images were reconstructed in axial, sagittal and coronal planes using   CT based attenuation correction.  Images were displayed as PET, CT and   fused data sets as well as maximum intensity pixel projections.  < from: NM PET/CT Onc FDG Skull to Thigh, Inital (09.13.19 @ 15:44) >  Findings:     Head and neck: Normal.  Chest wall: FDG avid (SUV 12.4) 16.1 x 8.5 cm right breast cavitary mass   with a few foci of gas. There is associated skin thickening and   subcutaneous edema. Multiple FDG avid right axillary lymph nodes, the   largest measuring 6.3 cm with an SUV of 9.6. There are multiple FDG avid   left axillary lymph nodes measuring up to 1.9 cm (SUV 5.8). There are   multiple FDG avid subcentimeter lymph nodes in the right subpectoral   region (high SUV 3.3). There is a 1.2 cm FDG avid (SUV 4.1) right   supraclavicular node.     Lungs and large airways: Mild interlobular septal thickening. 2 mm   calcified nodule right lower lobe compatible calcified granuloma.    Pleura:  There is mild FDG uptake within a small right pleural effusion   (SUV 2.3). There is a non-FDG avid trace left pleural effusion..    Mediastinum and hilar regions: There is mild FDG uptake within the right   perihilar region (SUV 2.8) (series 4 image 32).    Heart and pericardium:  Heart size is normal. No pericardial effusion.    Vessels:  Small calcified plaque aorta.    Liver:  Normal.    Gallbladder: No radiopaque stones gallbladder.    Spleen:  Normal.    Pancreas:  Normal.    Adrenal glands:  Increased FDG uptake in the adrenal glands, can be   physiologic.    Kidneys: Normal.    Abdominal and pelvic adenopathy:  No lymphadenopathy in abdomen or pelvis.    Ascites: None.    Gastrointestinal tract: Normal.    Pelvic organs: 2.4 cm left ovarian cyst without significant FDG activity.   Uterus and right adnexa unremarkable.    Soft tissues: Normal.    Bones: Normal.      Impression:     Large FDG avid right breast mass corresponding to patient's known right   breast cancer. Again noted is gas within the mass and infection cannot be   excluded.    FDG avid bilateral axillary, right supraclavicular and right subpectoral   lymph nodes suspicious for metastatic disease.    Mild FDG uptake within a small right pleural effusion which may be due to   infectious/inflammatory changes of the neoplasm cannot be excluded. Mild   FDG uptake within the right perihilar region which is nonspecific and may   represent a small right hilar lymph node.

## 2019-09-16 NOTE — PHYSICAL THERAPY INITIAL EVALUATION ADULT - LEVEL OF CONSCIOUSNESS, REHAB EVAL
of intestine; Hypertension; Impaired ambulation; Kidney stone; Morbid obesity with BMI of 40.0-44.9, adult ([facility] [address] ECTOR on CPAP; Osteoarthritis; [facility] Restless leg syndrome; Spinal stenosis in cervical region; Type II or unspecified type diabetes mellitus without mention of complication, not stated as uncontrolled; Unspecified sleep apnea; Urethral caruncle; and Wears glasses. PAST SURGICAL HISTORY: has a past surgical history that includes Cervical disc surgery (2012); Appendectomy; Tonsillectomy and adenoidectomy (1953); hernia repair (1999); eye surgery (Bilateral, 2017); Cervical spine surgery (5/31/13); laminectomy (05/31/2013); Upper gastrointestinal endoscopy (12/28/2016); Colonoscopy (2009); Colonoscopy (12/29/2016); Colonoscopy (12/30/2016); Colonoscopy (04/25/2017); pr colsc flx w/removal lesion by hot bx forceps (N/A, 4/25/2017); Cystorrhaphy (04/04/2018); pr cystourethroscopy,biopsies (N/A, 4/4/2018); bronchoscopy (06/05/2018); pr [facility] incl fluor gdnce dx w/cell washg spx (N/A, 6/5/2018); Upper gastrointestinal endoscopy (08/29/2018); and Upper gastrointestinal endoscopy (N/A, 8/29/2018). CURRENT MEDICATIONS:  has a current medication list which includes the following prescription(s): nystatin, linagliptin, amiodarone, metoprolol tartrate, alprazolam, furosemide, sitagliptan-metformin, spironolactone, ipratropium-albuterol, melatonin, magnesium oxide, calcitriol, atorvastatin, carbidopa-levodopa, calcium citrate, senna, conjugated estrogens, aspirin, ropinirole, pantoprazole, breo ellipta, ferrous sulfate, vitamin d, rasagiline mesylate, oxygen concentrator, onetouch verio, onetouch delica lancets 36Q, and spiriva respimat. ALLERGIES:  is allergic to dye [barium-containing compounds]; pcn [penicillins]; bactrim [sulfamethoxazole-trimethoprim]; and red dye. FAMILY HISTORY: Negative for any hematological or oncological conditions.      SOCIAL HISTORY:  reports that she quit Oral, resp. rate 18, height 5' 2\" (1.575 m), weight 184 lb (83.5 kg), last menstrual period 04/03/2004, not currently breastfeeding. HEENT:  Eyes are normal. Ears, nose and throat are normal.  Neck: Supple. No lymph node enlargement. No thyroid enlargement. Trachea is centrally located. Chest:  Clear to auscultation. No wheezes or crepitations. Heart: Regular sinus rhythm. Abdomen: Soft, nontender. No hepatosplenomegaly. No masses. Extremities:  With no edema. Lymph Nodes:  No cervical, axillary or inguinal lymph node enlargement. Neurologic:  Conscious and oriented. No focal neurological deficits. Psychosocial: No depression, anxiety or stress. Skin: No rashes, bruises or ecchymoses. Review of Diagnostic data:   Lab Results   Component Value Date    WBC 7.7 10/23/2018    HGB 9.5 (L) 10/23/2018    HCT 28.5 (L) 10/23/2018    MCV 98.3 10/23/2018     (L) 10/23/2018    LYMPHOPCT 18 (L) 10/23/2018    RBC 2.90 (L) 10/23/2018    MCH 32.9 10/23/2018    MCHC 33.5 10/23/2018    RDW 15.1 (H) 10/23/2018     Lab Results   Component Value Date    IRON 76 07/05/2018    TIBC 304 07/05/2018    FERRITIN 598 (H) 07/05/2018       Chemistry        Component Value Date/Time     10/07/2018 0520    K 4.2 10/07/2018 0520    CL 99 10/07/2018 0520    CO2 34 (H) 10/07/2018 0520    BUN 20 10/07/2018 0520    CREATININE 1.65 (H) 10/07/2018 0520        Component Value Date/Time    CALCIUM 10.2 10/07/2018 0520    ALKPHOS 69 10/06/2018 1045    AST 24 10/06/2018 1045    ALT 12 10/06/2018 1045    BILITOT 0.20 (L) 10/06/2018 1045        Lab Results   Component Value Date    IRON 76 07/05/2018    TIBC 304 07/05/2018    FERRITIN 598 (H) 07/05/2018         IMPRESSION:   Previous labs with Macrocytic anemia. Previous Evidence of Iron deficiency. History of thrombocytopenia. Repeated labs today with normocytic anemia. Anemia of chronic renal insufficiency stage IV. Multiple co-morbidities as listed.     PLAN:   I reviewed the labs as above and discussed with the patient. I explained to the patient the nature of this hematologic problem. I explained the significance of these abnormalities in layman language. She is s/p Iron replacement. Hb still below 10. It is anemia of chronic renal insufficiency stage IV. We will continue Aranesp every 3 weeks for Hb below 10. I will give Vitamin B12 injections monthly. We will evaluate response to treatment in few weeks. We will make further recommendations based on response to treatment. Patient's questions were answered to the best of her satisfaction and she verbalized full understanding and agreement. alert

## 2019-09-16 NOTE — PROGRESS NOTE ADULT - PROBLEM SELECTOR PLAN 4
- Plts 567 on admission. CBC in 2017 showing plt count 300, likely reactive process, possibly 2/2 to her h/o iron deficiency anemia vs myelodysplastic vs myeloproliferative syndrome  - Reticulocyte index 0.74, indicating hypoproliferation  - Plts have since been stable, today plts 435  - C/w daily CBC to trend Hgb and platelets

## 2019-09-16 NOTE — PHYSICAL THERAPY INITIAL EVALUATION ADULT - ADDITIONAL COMMENTS
Lives with brother in private house with 3 IRMA, 8 steps in the home. railings for stairs. patient denies h/o falls in last 6 months. Denies use of AD

## 2019-09-16 NOTE — CHART NOTE - NSCHARTNOTEFT_GEN_A_CORE
Upon Nutritional Assessment by the Registered Dietitian your patient was determined to meet criteria / has evidence of the following diagnosis/diagnoses:          [ ]  Mild Protein Calorie Malnutrition        [X ]  Moderate Protein Calorie Malnutrition        [ ] Severe Protein Calorie Malnutrition        [ ] Unspecified Protein Calorie Malnutrition        [ ] Underweight / BMI <19        [ ] Morbid Obesity / BMI > 40      Findings as based on:  •  Comprehensive nutrition assessment and consultation  1. Nutrition focused physical exam performed and suggestive of moderate muscle wasting in temporal, clavicle/shoulder regions, moderate fat loss in orbital fat pads, buccal fat pads, upper arms.   2. 23% wt loss x 10 months.   3. <75% PO intake x 1 month. Suspecting moderate malnutrition.      Treatment:    The following diet has been recommended:  1. Recommend continue with regular diet. Recommend adding Orgain 3 x daily (provides 660 kcal, 48 g protein).   2. Encourage PO intake.   3. [place] pt's food preferences.   4. Monitor wt trends.   5. Recommend adding multivitamin.     PROVIDER Section:     By signing this assessment you are acknowledging and agree with the diagnosis/diagnoses assigned by the Registered Dietitian    Comments:

## 2019-09-16 NOTE — DIETITIAN INITIAL EVALUATION ADULT. - PROBLEM SELECTOR PLAN 4
Plts 567. Appears to be new as CBC from 2017 showing plt count of 300. Likely reactive process, possibly 2/2 to her h/o iron deficiency anemia vs myelodysplastic vs myeloproliferative syndrome.  -Heme/onc consult   -f/u iron studies  -daily CBC

## 2019-09-16 NOTE — DIETITIAN INITIAL EVALUATION ADULT. - PROBLEM SELECTOR PLAN 6
1) PCP Contacted on Admission: (Y/N) --> Name & Phone #: Dr. Orr [phone]  2) Date of Contact with PCP:  3) PCP Contacted at Discharge: (Y/N, N/A)  4) Summary of Handoff Given to PCP:   5) Post-Discharge Appointment Date and Location:

## 2019-09-16 NOTE — DIETITIAN INITIAL EVALUATION ADULT. - PROBLEM SELECTOR PLAN 3
H/O of R breast cancer dx 2/2018. 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 Patient reports chemotherapy was offered at that time but she declined. Has been doing a holistic approach with supplements and vitamins. Will be starting treatment next week with Dr. Orr, unsure whether chemo and/or radiation.   -F/u on list of supplements and vitamins. She was told to have someone bring a list with names   -Heme/onc consult. R breast enlarged with malodorous discharge and mass. Also reporting mid-upper back pain. Concern for mets.

## 2019-09-16 NOTE — OCCUPATIONAL THERAPY INITIAL EVALUATION ADULT - PERTINENT HX OF CURRENT PROBLEM, REHAB EVAL
Patient is a 69yo female with PMH breast cancer (diagnosed in 2018), anemia, MVP presenting with dyspnea and chest pain on exertion and worsening fatigue. About 1 month ago she started feeling weak and tired. 2-3 weeks ago she started getting left sided chest pain and shortness of breath upon exerting herself. Pain and sob come on after walking 10 feet. Upon admission patient's hgb was 3.6

## 2019-09-16 NOTE — PROGRESS NOTE ADULT - SUBJECTIVE AND OBJECTIVE BOX
OVERNIGHT EVENTS:  Overnight, pt had shortness of breath, chest tightness, and light headedness while walking to bathroom, similar to her symptoms prior to admission. Symptoms resolved when patient returned to bed.    SUBJECTIVE / INTERVAL HPI: Patient seen and examined at bedside.     VITAL SIGNS:  Vital Signs Last 24 Hrs  T(C): 37.2 (16 Sep 2019 05:42), Max: 37.4 (15 Sep 2019 21:00)  T(F): 99 (16 Sep 2019 05:42), Max: 99.4 (15 Sep 2019 21:00)  HR: 82 (16 Sep 2019 05:42) (74 - 85)  BP: 104/64 (16 Sep 2019 05:42) (99/62 - 108/67)  BP(mean): --  RR: 19 (16 Sep 2019 05:42) (18 - 19)  SpO2: 98% (16 Sep 2019 05:42) (96% - 99%)    PHYSICAL EXAM:    General: WDWN  HEENT: NC/AT; PERRL, anicteric sclera; MMM  Neck: supple  Cardiovascular: +S1/S2, RRR  Respiratory: CTA B/L; no W/R/R  Gastrointestinal: soft, NT/ND; +BSx4  Extremities: WWP; no edema, clubbing or cyanosis  Vascular: 2+ radial, DP/PT pulses B/L  Neurological: AAOx3; no focal deficits    MEDICATIONS:  MEDICATIONS  (STANDING):  docusate sodium 100 milliGRAM(s) Oral three times a day  enoxaparin Injectable 40 milliGRAM(s) SubCutaneous daily  influenza   Vaccine 0.5 milliLiter(s) IntraMuscular once  magnesium sulfate  IVPB 1 Gram(s) IV Intermittent once  neomycin/BACItracin/polymyxin Topical Ointment 1 Application(s) Topical every 8 hours  pantoprazole    Tablet 40 milliGRAM(s) Oral before breakfast  piperacillin/tazobactam IVPB.. 4.5 Gram(s) IV Intermittent every 6 hours  polyethylene glycol 3350 17 Gram(s) Oral daily  senna 1 Tablet(s) Oral daily  vancomycin  IVPB 1000 milliGRAM(s) IV Intermittent every 12 hours    MEDICATIONS  (PRN):  acetaminophen   Tablet .. 325 milliGRAM(s) Oral every 4 hours PRN Temp greater or equal to 38C (100.4F), Moderate Pain (4 - 6), Severe Pain (7 - 10)  oxyCODONE    5 mG/acetaminophen 325 mG 1 Tablet(s) Oral every 6 hours PRN Moderate Pain (4 - 6)      ALLERGIES:  Allergies    No Known Allergies    Intolerances        LABS:                        8.0    10.04 )-----------( 435      ( 16 Sep 2019 05:43 )             25.9     09-16    139  |  106  |  10  ----------------------------<  95  4.2   |  24  |  0.91    Ca    8.8      16 Sep 2019 05:43  Mg     1.9     09-16          CAPILLARY BLOOD GLUCOSE          RADIOLOGY & ADDITIONAL TESTS: Reviewed. OVERNIGHT EVENTS:  Overnight, pt had shortness of breath, chest tightness, and light headedness while walking to bathroom, similar to her symptoms prior to admission. Symptoms resolved when patient returned to bed.    SUBJECTIVE / INTERVAL HPI: Patient seen and examined at bedside. Denies any acute complaints.    VITAL SIGNS:  Vital Signs Last 24 Hrs  T(C): 37.2 (16 Sep 2019 05:42), Max: 37.4 (15 Sep 2019 21:00)  T(F): 99 (16 Sep 2019 05:42), Max: 99.4 (15 Sep 2019 21:00)  HR: 82 (16 Sep 2019 05:42) (74 - 85)  BP: 104/64 (16 Sep 2019 05:42) (99/62 - 108/67)  BP(mean): --  RR: 19 (16 Sep 2019 05:42) (18 - 19)  SpO2: 98% (16 Sep 2019 05:42) (96% - 99%)    PHYSICAL EXAM:    General: WDWN  HEENT: NC/AT; PERRL, anicteric sclera, MMM  Neck: supple  Cardiovascular: +S1/S2, RRR  Respiratory: CTA B/L; no W/R/R  Breast: Right breast covered in ace wrap  Gastrointestinal: soft, NT/ND; +BS  Extremities: WWP; no edema, clubbing or cyanosis  Vascular: 2+ radial, DP/PT pulses B/L  Neurological: AAOx3; no focal deficits    MEDICATIONS:  MEDICATIONS  (STANDING):  docusate sodium 100 milliGRAM(s) Oral three times a day  enoxaparin Injectable 40 milliGRAM(s) SubCutaneous daily  influenza   Vaccine 0.5 milliLiter(s) IntraMuscular once  magnesium sulfate  IVPB 1 Gram(s) IV Intermittent once  neomycin/BACItracin/polymyxin Topical Ointment 1 Application(s) Topical every 8 hours  pantoprazole    Tablet 40 milliGRAM(s) Oral before breakfast  piperacillin/tazobactam IVPB.. 4.5 Gram(s) IV Intermittent every 6 hours  polyethylene glycol 3350 17 Gram(s) Oral daily  senna 1 Tablet(s) Oral daily  vancomycin  IVPB 1000 milliGRAM(s) IV Intermittent every 12 hours    MEDICATIONS  (PRN):  acetaminophen   Tablet .. 325 milliGRAM(s) Oral every 4 hours PRN Temp greater or equal to 38C (100.4F), Moderate Pain (4 - 6), Severe Pain (7 - 10)  oxyCODONE    5 mG/acetaminophen 325 mG 1 Tablet(s) Oral every 6 hours PRN Moderate Pain (4 - 6)      ALLERGIES:  Allergies    No Known Allergies    Intolerances        LABS:                        8.0    10.04 )-----------( 435      ( 16 Sep 2019 05:43 )             25.9     09-16    139  |  106  |  10  ----------------------------<  95  4.2   |  24  |  0.91    Ca    8.8      16 Sep 2019 05:43  Mg     1.9     09-16          CAPILLARY BLOOD GLUCOSE          RADIOLOGY & ADDITIONAL TESTS: Reviewed. OVERNIGHT EVENTS:  Overnight, pt had shortness of breath, chest tightness, and light headedness while walking to bathroom, similar to her symptoms prior to admission. Symptoms resolved when patient returned to bed.    SUBJECTIVE / INTERVAL HPI: Patient seen and examined at bedside. Denies any acute complaints.    VITAL SIGNS:  Vital Signs Last 24 Hrs  T(C): 37.2 (16 Sep 2019 05:42), Max: 37.4 (15 Sep 2019 21:00)  T(F): 99 (16 Sep 2019 05:42), Max: 99.4 (15 Sep 2019 21:00)  HR: 82 (16 Sep 2019 05:42) (74 - 85)  BP: 104/64 (16 Sep 2019 05:42) (99/62 - 108/67)  BP(mean): --  RR: 19 (16 Sep 2019 05:42) (18 - 19)  SpO2: 98% (16 Sep 2019 05:42) (96% - 99%)    PHYSICAL EXAM:    General: WDWN  HEENT: NC/AT; PERRL, anicteric sclera, MMM  Neck: supple  Cardiovascular: +S1/S2, RRR  Respiratory: CTA B/L; no W/R/R  Breast: Right breast covered in ace wrap, R>L axillary lymphadenopathy, R supraclavicular lymphadenopathy  Gastrointestinal: soft, distended, nontender; +BS  Extremities: WWP; no edema, clubbing or cyanosis  Vascular: 2+ radial, DP/PT pulses B/L  Neurological: AAOx3; no focal deficits    MEDICATIONS:  MEDICATIONS  (STANDING):  docusate sodium 100 milliGRAM(s) Oral three times a day  enoxaparin Injectable 40 milliGRAM(s) SubCutaneous daily  influenza   Vaccine 0.5 milliLiter(s) IntraMuscular once  magnesium sulfate  IVPB 1 Gram(s) IV Intermittent once  neomycin/BACItracin/polymyxin Topical Ointment 1 Application(s) Topical every 8 hours  pantoprazole    Tablet 40 milliGRAM(s) Oral before breakfast  piperacillin/tazobactam IVPB.. 4.5 Gram(s) IV Intermittent every 6 hours  polyethylene glycol 3350 17 Gram(s) Oral daily  senna 1 Tablet(s) Oral daily  vancomycin  IVPB 1000 milliGRAM(s) IV Intermittent every 12 hours    MEDICATIONS  (PRN):  acetaminophen   Tablet .. 325 milliGRAM(s) Oral every 4 hours PRN Temp greater or equal to 38C (100.4F), Moderate Pain (4 - 6), Severe Pain (7 - 10)  oxyCODONE    5 mG/acetaminophen 325 mG 1 Tablet(s) Oral every 6 hours PRN Moderate Pain (4 - 6)      ALLERGIES:  Allergies    No Known Allergies    Intolerances        LABS:                        8.0    10.04 )-----------( 435      ( 16 Sep 2019 05:43 )             25.9     09-16    139  |  106  |  10  ----------------------------<  95  4.2   |  24  |  0.91    Ca    8.8      16 Sep 2019 05:43  Mg     1.9     09-16          CAPILLARY BLOOD GLUCOSE          RADIOLOGY & ADDITIONAL TESTS: Reviewed.

## 2019-09-16 NOTE — DIETITIAN INITIAL EVALUATION ADULT. - MALNUTRITION
1. Nutrition focused physical exam performed and suggestive of moderate muscle wasting in temporal, clavicle/shoulder regions, moderate fat loss in orbital fat pads, buccal fat pads, upper arms. 2. 23% wt loss x 10 months. 3. <75% PO intake x 1 month. Suspecting moderate malnutrition. (suspect moderate malnutrition in context of chronic illness)

## 2019-09-16 NOTE — PROGRESS NOTE ADULT - PROBLEM SELECTOR PLAN 1
Anemia improved, Hgb 8.0  - Pt currently hemodynamically stable w/ HR 82, /64  - Denies syncope, palpitations, or chest pain  - Continue to trend Hgb with CBC

## 2019-09-16 NOTE — PHYSICAL THERAPY INITIAL EVALUATION ADULT - PERTINENT HX OF CURRENT PROBLEM, REHAB EVAL
Patient is a 67yo female with PMH breast cancer (diagnosed in 2018), anemia, MVP presenting with dyspnea and chest pain on exertion and worsening fatigue. About 1 month ago she started feeling weak and tired. 2-3 weeks ago she started getting left sided chest pain and shortness of breath upon exerting herself. Pain and sob come on after walking 10 feet. Upon admission patient's hgb was 3.6

## 2019-09-17 NOTE — PROGRESS NOTE ADULT - ASSESSMENT
Pt is a 69 yo F w/ hx rt-sided breast cancer (dx 2018), iron-deficiency anemia, mitral valve prolapse, presenting w/ 1-month hx of progressively worsening dyspnea on exertion recently associated w/ chest tightness in the setting of symptomatic anemia.

## 2019-09-17 NOTE — PROGRESS NOTE ADULT - SUBJECTIVE AND OBJECTIVE BOX
OVERNIGHT EVENTS:  No acute events overnight    SUBJECTIVE / INTERVAL HPI: Patient seen and examined at bedside.     VITAL SIGNS:  Vital Signs Last 24 Hrs  T(C): 36.9 (17 Sep 2019 06:06), Max: 37.2 (16 Sep 2019 11:49)  T(F): 98.5 (17 Sep 2019 06:06), Max: 99 (16 Sep 2019 11:49)  HR: 75 (17 Sep 2019 06:06) (68 - 87)  BP: 94/57 (17 Sep 2019 06:06) (94/57 - 104/62)  BP(mean): --  RR: 17 (17 Sep 2019 06:06) (17 - 18)  SpO2: 97% (17 Sep 2019 06:06) (97% - 100%)    PHYSICAL EXAM:    General: WDWN  HEENT: NC/AT; PERRL, anicteric sclera; MMM  Neck: supple  Cardiovascular: +S1/S2, RRR  Respiratory: CTA B/L; no W/R/R  Gastrointestinal: soft, NT/ND; +BSx4  Extremities: WWP; no edema, clubbing or cyanosis  Vascular: 2+ radial, DP/PT pulses B/L  Neurological: AAOx3; no focal deficits    MEDICATIONS:  MEDICATIONS  (STANDING):  dexamethasone  IVPB 16 milliGRAM(s) IV Intermittent once  docusate sodium 100 milliGRAM(s) Oral three times a day  enoxaparin Injectable 40 milliGRAM(s) SubCutaneous daily  fluorouracil INTRA-ARTERIAL (eMAR) 1000 milliGRAM(s) IntraArterial once  gemcitabine INTRA-ARTERIAL (eMAR) 1000 milliGRAM(s) IntraArterial once  influenza   Vaccine 0.5 milliLiter(s) IntraMuscular once  mitoXANtrone IVPB (eMAR) 10 milliGRAM(s) IV Intermittent once  neomycin/BACItracin/polymyxin Topical Ointment 1 Application(s) Topical every 8 hours  ondansetron  IVPB 16 milliGRAM(s) IV Intermittent once  OXALIplatin INTRA-ARTERIAL (eMAR) 100 milliGRAM(s) IntraArterial once  pantoprazole    Tablet 40 milliGRAM(s) Oral before breakfast  piperacillin/tazobactam IVPB.. 4.5 Gram(s) IV Intermittent every 6 hours  polyethylene glycol 3350 17 Gram(s) Oral daily  senna 1 Tablet(s) Oral daily  vancomycin  IVPB 1250 milliGRAM(s) IV Intermittent every 12 hours    MEDICATIONS  (PRN):  acetaminophen   Tablet .. 325 milliGRAM(s) Oral every 4 hours PRN Temp greater or equal to 38C (100.4F), Moderate Pain (4 - 6), Severe Pain (7 - 10)  oxyCODONE    5 mG/acetaminophen 325 mG 1 Tablet(s) Oral every 6 hours PRN Moderate Pain (4 - 6)      ALLERGIES:  Allergies    No Known Allergies    Intolerances        LABS:                        7.6    10.36 )-----------( 433      ( 17 Sep 2019 05:49 )             25.7     09-17    136  |  104  |  9   ----------------------------<  105<H>  4.0   |  21<L>  |  0.86    Ca    8.7      17 Sep 2019 05:49  Mg     2.1     09-17          CAPILLARY BLOOD GLUCOSE          RADIOLOGY & ADDITIONAL TESTS: Reviewed. OVERNIGHT EVENTS:  No acute events overnight    SUBJECTIVE / INTERVAL HPI: Patient seen and examined at bedside. This morning pt denies any acute complaints but states that she is not interested in receiving the biopsy.    VITAL SIGNS:  Vital Signs Last 24 Hrs  T(C): 36.9 (17 Sep 2019 06:06), Max: 37.2 (16 Sep 2019 11:49)  T(F): 98.5 (17 Sep 2019 06:06), Max: 99 (16 Sep 2019 11:49)  HR: 75 (17 Sep 2019 06:06) (68 - 87)  BP: 94/57 (17 Sep 2019 06:06) (94/57 - 104/62)  BP(mean): --  RR: 17 (17 Sep 2019 06:06) (17 - 18)  SpO2: 97% (17 Sep 2019 06:06) (97% - 100%)    PHYSICAL EXAM:    General: WDWN  HEENT: NC/AT; PERRL, anicteric sclera, MMM  Neck: supple  Cardiovascular: +S1/S2, RRR  Respiratory: CTA B/L; no W/R/R  Breast: Right breast covered in ace wrap, R>L axillary lymphadenopathy, R supraclavicular lymphadenopathy  Gastrointestinal: soft, distended, nontender; +BS  Extremities: WWP; no edema, clubbing or cyanosis  Vascular: 2+ radial, DP/PT pulses B/L  Neurological: AAOx3; no focal deficits    MEDICATIONS:  MEDICATIONS  (STANDING):  dexamethasone  IVPB 16 milliGRAM(s) IV Intermittent once  docusate sodium 100 milliGRAM(s) Oral three times a day  enoxaparin Injectable 40 milliGRAM(s) SubCutaneous daily  fluorouracil INTRA-ARTERIAL (eMAR) 1000 milliGRAM(s) IntraArterial once  gemcitabine INTRA-ARTERIAL (eMAR) 1000 milliGRAM(s) IntraArterial once  influenza   Vaccine 0.5 milliLiter(s) IntraMuscular once  mitoXANtrone IVPB (eMAR) 10 milliGRAM(s) IV Intermittent once  neomycin/BACItracin/polymyxin Topical Ointment 1 Application(s) Topical every 8 hours  ondansetron  IVPB 16 milliGRAM(s) IV Intermittent once  OXALIplatin INTRA-ARTERIAL (eMAR) 100 milliGRAM(s) IntraArterial once  pantoprazole    Tablet 40 milliGRAM(s) Oral before breakfast  piperacillin/tazobactam IVPB.. 4.5 Gram(s) IV Intermittent every 6 hours  polyethylene glycol 3350 17 Gram(s) Oral daily  senna 1 Tablet(s) Oral daily  vancomycin  IVPB 1250 milliGRAM(s) IV Intermittent every 12 hours    MEDICATIONS  (PRN):  acetaminophen   Tablet .. 325 milliGRAM(s) Oral every 4 hours PRN Temp greater or equal to 38C (100.4F), Moderate Pain (4 - 6), Severe Pain (7 - 10)  oxyCODONE    5 mG/acetaminophen 325 mG 1 Tablet(s) Oral every 6 hours PRN Moderate Pain (4 - 6)      ALLERGIES:  Allergies    No Known Allergies    Intolerances        LABS:                        7.6    10.36 )-----------( 433      ( 17 Sep 2019 05:49 )             25.7     09-17    136  |  104  |  9   ----------------------------<  105<H>  4.0   |  21<L>  |  0.86    Ca    8.7      17 Sep 2019 05:49  Mg     2.1     09-17          CAPILLARY BLOOD GLUCOSE          RADIOLOGY & ADDITIONAL TESTS: Reviewed.

## 2019-09-17 NOTE — PROGRESS NOTE ADULT - PROBLEM SELECTOR PLAN 1
Hgb 7.6 today, stable  - Pt currently hemodynamically stable w/ HR 75, BP 94/57 (has been low for past few days)  - Denies syncope, palpitations, or chest pain  - Continue to trend Hgb with CBC  - Active T&S

## 2019-09-17 NOTE — PROGRESS NOTE ADULT - ASSESSMENT
The patient has been nothing by mouth and will be going for regional chemotherapy and embolization today.  This was discussed in detail with Dr. Rio Pham.  It was the opinion of Dr. Nathaniel Orr that the patient should receive no antibiotics, no regional chemotherapy or chemoperfusion/embolization to prevent further blood loss but only radiation therapy.  I felt that his opinion was not necessarily in the patient's best interest given the large amount of blood loss from the breast itself, its amazing extreme vascularity and the large size of the tumor making it unlikely to be completely eradicated by radiation therapy alone.  I also thought that because the patient had triple negative breast cancer she would be a good candidate for regional chemotherapy, systemic chemotherapy and ultimately immunotherapy.

## 2019-09-17 NOTE — PROGRESS NOTE ADULT - PROBLEM SELECTOR PLAN 2
- Pt's case being followed/managed by outpatient oncologist, Dr. Quintin Barnes  - R breast enlarged with malodorous discharge and mass  - Continue zosyn 4.5mg q6h  - AM vancomycin level 12.8 -> vanc increased 1g->1.250g q12h  - f/u 4pm vanc trough tomorrow  - H/O of R breast cancer dx 2/2018  - 2012 biopsy of 1.3 cm breast mass found to be highly undifferentiated 9/9 ductal carcinoma, and triple negative  - F/u blood markers for breast cancer CEA 27 & CEA 29  - PET CT: right breast mass corresponding to known cancer, lymph nodes suspicious for metastatic disease, uptake in small right pleural effusion that may be due to infection/inflammation related to neoplasm  - Pt to undergo regional chemo perfusion/embolization of the breast mass today w/ Dr. Rio Pham

## 2019-09-17 NOTE — PROGRESS NOTE ADULT - SUBJECTIVE AND OBJECTIVE BOX
68y old  Female who presents with a chief complaint of Symptomatic Anemia   	  MEDICATIONS:    piperacillin/tazobactam IVPB.. 4.5 Gram(s) IV Intermittent every 6 hours  vancomycin  IVPB 1250 milliGRAM(s) IV Intermittent every 12 hours      acetaminophen   Tablet .. 325 milliGRAM(s) Oral every 4 hours PRN  ondansetron  IVPB 16 milliGRAM(s) IV Intermittent once  oxyCODONE    5 mG/acetaminophen 325 mG 1 Tablet(s) Oral every 6 hours PRN    docusate sodium 100 milliGRAM(s) Oral three times a day  pantoprazole    Tablet 40 milliGRAM(s) Oral before breakfast  polyethylene glycol 3350 17 Gram(s) Oral daily  senna 1 Tablet(s) Oral daily    dexamethasone  IVPB 16 milliGRAM(s) IV Intermittent once    enoxaparin Injectable 40 milliGRAM(s) SubCutaneous daily  fluorouracil INTRA-ARTERIAL (eMAR) 1000 milliGRAM(s) IntraArterial once  gemcitabine INTRA-ARTERIAL (eMAR) 1000 milliGRAM(s) IntraArterial once  influenza   Vaccine 0.5 milliLiter(s) IntraMuscular once  mitoXANtrone IVPB (eMAR) 10 milliGRAM(s) IV Intermittent once  neomycin/BACItracin/polymyxin Topical Ointment 1 Application(s) Topical every 8 hours  OXALIplatin INTRA-ARTERIAL (eMAR) 100 milliGRAM(s) IntraArterial once  sodium chloride 0.9%. 1000 milliLiter(s) IV Continuous <Continuous>    Complaint:     Otherwise 12 point review of systems is normal.    PHYSICAL EXAM:  Constitutional: good  Eyes: PERRL, EOMI, sclera non-icteric  Neck: supple, no masses, no JVD  Respiratory: CTA b/l, good air entry b/l, no wheezing, rhonchi, rales, with normal respiratory effort and no intercostal retractions  Cardiovascular: RRR, normal S1S2, no M/R/G  Gastrointestinal: soft,ND, no masses palpable, BS+,distended,   Extremities:  no c/c/e  Neurological: AAOx3  Breast: Right breast covered in ace wrap, R>L axillary lymphadenopathy, R supraclavicular lymphadenopathy    ICU Vital Signs Last 24 Hrs  T(C): 36.4 (17 Sep 2019 17:24), Max: 37.1 (16 Sep 2019 21:00)  T(F): 97.6 (17 Sep 2019 17:24), Max: 98.7 (16 Sep 2019 21:00)  HR: 79 (17 Sep 2019 17:24) (68 - 79)  BP: 102/62 (17 Sep 2019 17:24) (94/57 - 104/62)  BP(mean): --  ABP: --  ABP(mean): --  RR: 18 (17 Sep 2019 17:24) (17 - 18)  SpO2: 97% (17 Sep 2019 17:24) (97% - 100%)      LABS:	 	  CARDIAC MARKERS:                       7.6    10.36 )-----------( 433      ( 17 Sep 2019 05:49 )             25.7     09-17    136  |  104  |  9   ----------------------------<  105<H>  4.0   |  21<L>  |  0.86    Ca    8.7      17 Sep 2019 05:49  Mg     2.1     09-17            Assessment and Plan:   · Assessment		  Pt is a 67 yo F w/ hx rt-sided breast cancer (dx 2018), iron-deficiency anemia, mitral valve prolapse, presenting w/ 1-month hx of progressively worsening dyspnea on exertion recently associated w/ chest tightness in the setting of symptomatic anemia.      Problem/Plan - 1:  ·  Problem: Anemia.  Plan: Hgb 7.6 today, stable  - Pt currently hemodynamically stable w/ HR 75, BP 94/57 (has been low for past few days)  - Denies syncope, palpitations, or chest pain  - Continue to trend Hgb with CBC  - Active T&S.     Problem/Plan - 2:  ·  Problem: Breast cancer.  Plan: - Pt's case being followed/managed by outpatient oncologist, Dr. Quintin Barnes  - R breast enlarged with malodorous discharge and mass  - Continue zosyn 4.5mg q6h  - AM vancomycin level 12.8 -> vanc increased 1g->1.250g q12h  - f/u 4pm vanc trough tomorrow  - H/O of R breast cancer dx 2/2018  - 2012 biopsy of 1.3 cm breast mass found to be highly undifferentiated 9/9 ductal carcinoma, and triple negative  - F/u blood markers for breast cancer CEA 27 & CEA 29  - PET CT: right breast mass corresponding to known cancer, lymph nodes suspicious for metastatic disease, uptake in small right pleural effusion that may be due to infection/inflammation related to neoplasm  - Pt to undergo regional chemo perfusion/embolization of the breast mass today w/ Dr. Rio Pham.     Problem/Plan - 3:  ·  Problem: R/O Thrombocytosis.  Plan: - Plts 567 on admission, has been stable in 430s. CBC in 2017 showing plt count 300, likely reactive process, possibly 2/2 to her h/o iron deficiency anemia vs myelodysplastic vs myeloproliferative syndrome  - Reticulocyte index 0.74, indicating hypoproliferation  - Plts have since been stable, today plts 435  - C/w daily CBC to trend Hgb and platelets.     Problem/Plan - 4:  ·  Problem: Nutrition, metabolism, and development symptoms.  Plan: F: None  E: Replete K <4, Mg <2  N: Regular  GI Ppx: Protonix  DVT Ppx: SCDs  Code status: FULL  Dispo: [facility].

## 2019-09-17 NOTE — BRIEF OPERATIVE NOTE - OPERATION/FINDINGS
-angiogram of thoracic aorto shows hypervascular mass of left chest wall supplied by two branches of axillary artery and distal branches of LIMA  -intraarterial chemoinfusion performed via two branches of axillary artery, distal BRENDA using a total of 1,000mg 5FU, 1,000mg Gemcitabine, 100mg Oxaliplatin, 10mg Novantrone  -Transcatheter embolization of branch of axillary artery performed using 700-900micron Embospheres  -Coil embolization of distal LIMA performed using three 2x4 Concerto coils for more targeted chemoinfusion  -transcatheter embolization of distal LIMA performed using 700-900 micron Embospheres -angiogram of thoracic aorto shows hypervascular mass of right chest wall supplied by two branches of axillary artery and distal branches of YANELIS  -intraarterial chemoinfusion performed via two branches of axillary artery, distal BRENDA using a total of 1,000mg 5FU, 1,000mg Gemcitabine, 100mg Oxaliplatin, 10mg Novantrone  -Transcatheter embolization of branch of axillary artery performed using 700-900micron Embospheres  -Coil embolization of distal YANELIS performed using three 2x4 Concerto coils for more targeted chemoinfusion  -transcatheter embolization of distal YANELIS performed using 700-900 micron Embospheres

## 2019-09-17 NOTE — CONSULT NOTE ADULT - ASSESSMENT
Pt is a 69 yo F w/ hx rt-sided breast cancer (dx 2018), iron-deficiency anemia, mitral valve prolapse, presenting w/ 1-month hx of progressively worsening dyspnea on exertion recently associated w/ chest tightness in the setting of symptomatic anemia (baseline Hgb ~10)      per Internal Medicine    Problem/Plan - 1:  ·  Problem: Anemia.  Plan: Anemia improved, Hgb 8.0  - Pt currently hemodynamically stable w/ HR 82, /64  - Denies syncope, palpitations, or chest pain  - Continue to trend Hgb with CBC.     Problem/Plan - 2:  ·  Problem: Breast cancer.  Plan: - Pt's case being followed/managed by outpatient oncologist, Dr. Quintin Barnes  - R breast enlarged with malodorous discharge and mass  - Continue zosyn 4.5mg q6h and vancomycin 1g q12h  - f/u 6pm vanc trough  - H/O of R breast cancer dx 2/2018  - 2012 biopsy of 1.3 cm breast mass found to be highly undifferentiated 9/9 ductal carcinoma, and triple negative  - F/u blood markers for breast cancer CEA 27 & CEA 29  - PET CT: right breast mass corresponding to known cancer, lymph nodes suspicious for metastatic disease, uptake in small right pleural effusion that may be due to infection/inflammation related to neoplasm  - Pt currently scheduled to undergo regional chemo perfusion/embolization of the breast mass    Biopsied sample from procedure will be sent for cytometric testing by Dr. Emiliano Casey based in [place], [place].     Problem/Plan - 3:  ·  Problem: R/O GI bleed.  Plan: Given profound anemia, patient assessed for GIB on admission, no evidence found  - Pt endorses occasional dark red BM, however she attributes this to consumption of beets  - C-scope performed in 2016 revealed no suspicious findings  - Rectal exam performed on admission negative for masses, melena, bright red blood.     Problem/Plan - 4:  ·  Problem: R/O Thrombocytosis.  Plan: - Plts 567 on admission. CBC in 2017 showing plt count 300, likely reactive process, possibly 2/2 to her h/o iron deficiency anemia vs myelodysplastic vs myeloproliferative syndrome  - Reticulocyte index 0.74, indicating hypoproliferation  - Plts have since been stable, today plts 435  - C/w daily CBC to trend Hgb and platelets.     Problem/Plan - 5:  ·  Problem: Nutrition, metabolism, and development symptoms.  Plan: F: None  E: Replete K <4, Mg <2  N: Regular  GI Ppx: Protonix  DVT Ppx: SCDs  Code status: FULL  Dispo: [facility].

## 2019-09-17 NOTE — PROGRESS NOTE ADULT - PROBLEM SELECTOR PLAN 4
F: None  E: Replete K <4, Mg <2  N: Regular  GI Ppx: Protonix  DVT Ppx: SCDs  Code status: FULL  Dispo: [facility]

## 2019-09-17 NOTE — PROGRESS NOTE ADULT - PROBLEM SELECTOR PLAN 5
1) PCP Contacted on Admission: (Y/N) --> Name & Phone #: Dr. Orr [phone]  2) Date of Contact with PCP:  3) PCP Contacted at Discharge: (Y/N, N/A)  4) Summary of Handoff Given to PCP:   5) Post-Discharge Appointment Date and Location:

## 2019-09-17 NOTE — BRIEF OPERATIVE NOTE - NSICDXBRIEFPROCEDURE_GEN_ALL_CORE_FT
PROCEDURES:  Embolization, artery 17-Sep-2019 12:23:26  Rosa Fine  Chemotherapy, intra-arterial infusion 17-Sep-2019 12:23:15  Rosa Fine  Angiogram thorax 17-Sep-2019 12:23:08  Rosa Fine

## 2019-09-17 NOTE — PROGRESS NOTE ADULT - PROBLEM SELECTOR PROBLEM 5
Nutrition, metabolism, and development symptoms
Nutrition, metabolism, and development symptoms
Transition of care performed with sharing of clinical summary
Nutrition, metabolism, and development symptoms
Nutrition, metabolism, and development symptoms

## 2019-09-17 NOTE — PROGRESS NOTE ADULT - PROBLEM SELECTOR PLAN 3
- Plts 567 on admission, has been stable in 430s. CBC in 2017 showing plt count 300, likely reactive process, possibly 2/2 to her h/o iron deficiency anemia vs myelodysplastic vs myeloproliferative syndrome  - Reticulocyte index 0.74, indicating hypoproliferation  - Plts have since been stable, today plts 435  - C/w daily CBC to trend Hgb and platelets

## 2019-09-17 NOTE — PROGRESS NOTE ADULT - ASSESSMENT
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  These antimicrobials might need outpatient adjustments, as per Microlab testing Fungating superinfected mass/cancer in right breast     RECOMMEND  Obtain EKG to rule out long QTc  Continue IV Vancomycin and Pip-Tazo until discharge  Then, continue abx with PO Levofloxacin 500 mg daily (if no prolongation of QTc) plus Metronidazole 500 mg 3x per day for maximum additional 7 days with close PMD follow-up.  These antimicrobials might need outpatient adjustments, as per Microlab testing

## 2019-09-17 NOTE — PROGRESS NOTE ADULT - SUBJECTIVE AND OBJECTIVE BOX
INTERVAL HPI/OVERNIGHT EVENTS:    ANTIBIOTICS/RELEVANT:    MEDICATIONS  (STANDING):  dexamethasone  IVPB 16 milliGRAM(s) IV Intermittent once  docusate sodium 100 milliGRAM(s) Oral three times a day  enoxaparin Injectable 40 milliGRAM(s) SubCutaneous daily  fluorouracil INTRA-ARTERIAL (eMAR) 1000 milliGRAM(s) IntraArterial once  gemcitabine INTRA-ARTERIAL (eMAR) 1000 milliGRAM(s) IntraArterial once  influenza   Vaccine 0.5 milliLiter(s) IntraMuscular once  mitoXANtrone IVPB (eMAR) 10 milliGRAM(s) IV Intermittent once  neomycin/BACItracin/polymyxin Topical Ointment 1 Application(s) Topical every 8 hours  ondansetron  IVPB 16 milliGRAM(s) IV Intermittent once  OXALIplatin INTRA-ARTERIAL (eMAR) 100 milliGRAM(s) IntraArterial once  pantoprazole    Tablet 40 milliGRAM(s) Oral before breakfast  piperacillin/tazobactam IVPB.. 4.5 Gram(s) IV Intermittent every 6 hours  polyethylene glycol 3350 17 Gram(s) Oral daily  senna 1 Tablet(s) Oral daily  vancomycin  IVPB 1250 milliGRAM(s) IV Intermittent every 12 hours    MEDICATIONS  (PRN):  acetaminophen   Tablet .. 325 milliGRAM(s) Oral every 4 hours PRN Temp greater or equal to 38C (100.4F), Moderate Pain (4 - 6), Severe Pain (7 - 10)  oxyCODONE    5 mG/acetaminophen 325 mG 1 Tablet(s) Oral every 6 hours PRN Moderate Pain (4 - 6)      Allergies    No Known Allergies    Intolerances        Vital Signs Last 24 Hrs  T(C): 36.9 (17 Sep 2019 06:06), Max: 37.2 (16 Sep 2019 11:49)  T(F): 98.5 (17 Sep 2019 06:06), Max: 99 (16 Sep 2019 11:49)  HR: 75 (17 Sep 2019 06:06) (68 - 76)  BP: 94/57 (17 Sep 2019 06:06) (94/57 - 104/62)  BP(mean): --  RR: 17 (17 Sep 2019 06:06) (17 - 18)  SpO2: 97% (17 Sep 2019 06:06) (97% - 100%)    PHYSICAL EXAM:      Constitutional:    Eyes:    ENMT:    Neck:    Breasts:    Back:    Respiratory:    Cardiovascular:    Gastrointestinal:    Genitourinary:    Rectal:    Extremities:    Vascular:    Neurological:    Skin:    Lymph Nodes:    Musculoskeletal:    Psychiatric:        LABS:                        7.6    10.36 )-----------( 433      ( 17 Sep 2019 05:49 )             25.7     09-17    136  |  104  |  9   ----------------------------<  105<H>  4.0   |  21<L>  |  0.86    Ca    8.7      17 Sep 2019 05:49  Mg     2.1     09-17            MICROBIOLOGY:    RADIOLOGY & ADDITIONAL STUDIES: INTERVAL HPI/OVERNIGHT EVENTS:    Patient in IR at present for regional chemo  Case discussed with Dr Barnes and house staff  Remains clinically stable  Doscharge plpanned for tomorrow AM    MEDICATIONS  (STANDING):  dexamethasone  IVPB 16 milliGRAM(s) IV Intermittent once  docusate sodium 100 milliGRAM(s) Oral three times a day  enoxaparin Injectable 40 milliGRAM(s) SubCutaneous daily  fluorouracil INTRA-ARTERIAL (eMAR) 1000 milliGRAM(s) IntraArterial once  gemcitabine INTRA-ARTERIAL (eMAR) 1000 milliGRAM(s) IntraArterial once  influenza   Vaccine 0.5 milliLiter(s) IntraMuscular once  mitoXANtrone IVPB (eMAR) 10 milliGRAM(s) IV Intermittent once  neomycin/BACItracin/polymyxin Topical Ointment 1 Application(s) Topical every 8 hours  ondansetron  IVPB 16 milliGRAM(s) IV Intermittent once  OXALIplatin INTRA-ARTERIAL (eMAR) 100 milliGRAM(s) IntraArterial once  pantoprazole    Tablet 40 milliGRAM(s) Oral before breakfast  piperacillin/tazobactam IVPB.. 4.5 Gram(s) IV Intermittent every 6 hours  polyethylene glycol 3350 17 Gram(s) Oral daily  senna 1 Tablet(s) Oral daily  vancomycin  IVPB 1250 milliGRAM(s) IV Intermittent every 12 hours    MEDICATIONS  (PRN):  acetaminophen   Tablet .. 325 milliGRAM(s) Oral every 4 hours PRN Temp greater or equal to 38C (100.4F), Moderate Pain (4 - 6), Severe Pain (7 - 10)  oxyCODONE    5 mG/acetaminophen 325 mG 1 Tablet(s) Oral every 6 hours PRN Moderate Pain (4 - 6)      Allergies    No Known Allergies      Vital Signs Last 24 Hrs  T(C): 36.9 (17 Sep 2019 06:06), Max: 37.2 (16 Sep 2019 11:49)  T(F): 98.5 (17 Sep 2019 06:06), Max: 99 (16 Sep 2019 11:49)  HR: 75 (17 Sep 2019 06:06) (68 - 76)  BP: 94/57 (17 Sep 2019 06:06) (94/57 - 104/62)  BP(mean): --  RR: 17 (17 Sep 2019 06:06) (17 - 18)  SpO2: 97% (17 Sep 2019 06:06) (97% - 100%)        LABS:                        7.6    10.36 )-----------( 433      ( 17 Sep 2019 05:49 )             25.7     09-17    136  |  104  |  9   ----------------------------<  105<H>  4.0   |  21<L>  |  0.86    Ca    8.7      17 Sep 2019 05:49  Mg     2.1     09-17        MICROBIOLOGY:    Culture - Other (09.13.19 @ 10:32)    Gram Stain:   Rare WBC's  Numerous Gram Negative Rods  Numerous Gram positive cocci in pairs    Specimen Source: .Other Right breast wound culture    Culture Results:   Numerous Pseudomonas aeruginosa  Numerous Streptococcus anginosus  Numerous Escherichia coli  Numerous Staphylococcus aureus  Numerous Corynebacterium striatum group  Numerous Arthrobacter species  More than three types of organisms recovered may indicate colonization  and/or contamination.  Please call Microbiology immediately if identification and/or  suceptibility is indicated.        Per Micro lab, preliminary info:  MSSA   Pseudomonas sensitive to FQ INTERVAL HPI/OVERNIGHT EVENTS:    Patient in IR at present for regional chemo  Case discussed with Dr Barnes and house staff  Remains clinically stable  Discharge planned for tomorrow AM      Patient seen after chemo  Feels a little dizzy, otherwise OK        MEDICATIONS  (STANDING):  dexamethasone  IVPB 16 milliGRAM(s) IV Intermittent once  docusate sodium 100 milliGRAM(s) Oral three times a day  enoxaparin Injectable 40 milliGRAM(s) SubCutaneous daily  fluorouracil INTRA-ARTERIAL (eMAR) 1000 milliGRAM(s) IntraArterial once  gemcitabine INTRA-ARTERIAL (eMAR) 1000 milliGRAM(s) IntraArterial once  influenza   Vaccine 0.5 milliLiter(s) IntraMuscular once  mitoXANtrone IVPB (eMAR) 10 milliGRAM(s) IV Intermittent once  neomycin/BACItracin/polymyxin Topical Ointment 1 Application(s) Topical every 8 hours  ondansetron  IVPB 16 milliGRAM(s) IV Intermittent once  OXALIplatin INTRA-ARTERIAL (eMAR) 100 milliGRAM(s) IntraArterial once  pantoprazole    Tablet 40 milliGRAM(s) Oral before breakfast  piperacillin/tazobactam IVPB.. 4.5 Gram(s) IV Intermittent every 6 hours  polyethylene glycol 3350 17 Gram(s) Oral daily  senna 1 Tablet(s) Oral daily  vancomycin  IVPB 1250 milliGRAM(s) IV Intermittent every 12 hours    MEDICATIONS  (PRN):  acetaminophen   Tablet .. 325 milliGRAM(s) Oral every 4 hours PRN Temp greater or equal to 38C (100.4F), Moderate Pain (4 - 6), Severe Pain (7 - 10)  oxyCODONE    5 mG/acetaminophen 325 mG 1 Tablet(s) Oral every 6 hours PRN Moderate Pain (4 - 6)      Allergies    No Known Allergies    EXAM  Vital Signs Last 24 Hrs  T(C): 36.9 (17 Sep 2019 06:06), Max: 37.2 (16 Sep 2019 11:49)  T(F): 98.5 (17 Sep 2019 06:06), Max: 99 (16 Sep 2019 11:49)  HR: 75 (17 Sep 2019 06:06) (68 - 76)  BP: 94/57 (17 Sep 2019 06:06) (94/57 - 104/62)  BP(mean): --  RR: 17 (17 Sep 2019 06:06) (17 - 18)  SpO2: 97% (17 Sep 2019 06:06) (97% - 100%)  Awake and alert  Responding appropriately   No distress  No rash  Right breast wrapped      LABS:                        7.6    10.36 )-----------( 433      ( 17 Sep 2019 05:49 )             25.7     09-17    136  |  104  |  9   ----------------------------<  105<H>  4.0   |  21<L>  |  0.86    Ca    8.7      17 Sep 2019 05:49  Mg     2.1     09-17        MICROBIOLOGY:    Culture - Other (09.13.19 @ 10:32)    Gram Stain:   Rare WBC's  Numerous Gram Negative Rods  Numerous Gram positive cocci in pairs    Specimen Source: .Other Right breast wound culture    Culture Results:   Numerous Pseudomonas aeruginosa  Numerous Streptococcus anginosus  Numerous Escherichia coli  Numerous Staphylococcus aureus  Numerous Corynebacterium striatum group  Numerous Arthrobacter species  More than three types of organisms recovered may indicate colonization  and/or contamination.  Please call Microbiology immediately if identification and/or  suceptibility is indicated.    Sensitivities pending    Per Micro lab, preliminary info:  MSSA   Pseudomonas sensitive to FQ

## 2019-09-17 NOTE — PROGRESS NOTE ADULT - SUBJECTIVE AND OBJECTIVE BOX
The patient has good pain control.  She slept well.  She thought about cytometric testing which can cost $8000 and decided this is not for her.  The patient will consent to regional chemoperfusion/embolization.  She was happy that she would be discharged in the morning.  She does not want to have a deep venous access device or systemic chemotherapy at this time.  She understands that she will need radiation therapy.    On physical exam the patient presently has normal vital signs.  Examination of the head and neck reveals mucous membrane and facial pallor.  The patient has poor dental health.  There is no palpable lymphadenopathy or jugular venous distention or palpable thyroid in the neck.  The lungs are clear to percussion and auscultation.  The left axilla and breast are entirely within normal limits.  There is an 8 x 10 cm mass that is relatively fixed in the right axilla.  There is a Pressure dressing over a large 4 cm crater at the center of the breast which is oozing blood.  The mass itself measures 16 x 20 cm.  It is not tender.  The patient has mild truncal obesity but there is no palpable or percussible hepatomegaly or splenomegaly.  The patient has no chronic venous stasis changes in the lower extremities.  Neurologically there is no gross upper malady in higher cortical, cerebellar, motor or sensory functions.  The patient has no rash.

## 2019-09-18 NOTE — PROGRESS NOTE ADULT - ASSESSMENT
The patient is responding to the treatment.  The patient no longer has a bloody wound but it clearly has purulent material and continues to weep.  The patient is ready for discharge.

## 2019-09-18 NOTE — DISCHARGE NOTE PROVIDER - CARE PROVIDER_API CALL
Quintin Barnes)  Internal Medicine; Medical Oncology  [address] [place] [address]  Phone: [phone]  Fax: [phone]  Follow Up Time:

## 2019-09-18 NOTE — PROGRESS NOTE ADULT - REASON FOR ADMISSION
Symptomatic Anemia

## 2019-09-18 NOTE — PROGRESS NOTE ADULT - SUBJECTIVE AND OBJECTIVE BOX
The patient had no ill effects from the chemoperfusion/ embolization. She is anxious to go home and to radiation therapy.    On physical exam the patient presently has normal vital signs.  Examination of the head and neck reveals mucous membrane and facial pallor.  The patient has poor dental health.  There is no palpable lymphadenopathy or jugular venous distention or palpable thyroid in the neck.  The lungs are clear to percussion and auscultation.  The left axilla and breast are entirely within normal limits.  There is an 6 x 7 cm mass that is No longer fixed in the right axilla.  There is a Pressure dressing over a large 4 cm crater at the center of the breast which is oozing blood.  The mass itself measures 12 x 16 cm.  It is not tender.  The patient has mild truncal obesity but there is no palpable or percussible hepatomegaly or splenomegaly.  The patient has no chronic venous stasis changes in the lower extremities.  Neurologically there is no gross upper malady in higher cortical, cerebellar, motor or sensory functions.  The patient has no rash.

## 2019-09-18 NOTE — PROGRESS NOTE ADULT - ATTENDING COMMENTS
Nothing by mouth after midnight.  Removal of bandage before the operating room early tomorrow morning.  Transfer to [address] [place] [address] on Wednesday
Patient seen and examined and evaluation completed by me in person
Regional chemo perfusion/embolization today per Dr. Rio Pham.  Discharge the patient in the morning after bandaging and cleaning the wound.  The patient will be referred to Dr. Nathaniel Orr for radiation therapy in the afternoon.  The patient will see me the week after her regional chemoperfusion.
The patient should be discharged by 12 noon.  She should be directed to customer services to  her CT angiogram and CT PET scan.  She should be discharged on Levaquin 500 mg by mouth daily and Flagyl 500 mg by mouth 3 times a day Indefinitely.  She should see Dr. Nathaniel Orr this afternoon with her x-rays.  The patient should remain on antibiotics until there is no evidence of cancer.  The patient should see me within 2 weeks and most likely should see me next week!
Patient seen and examined and evaluation completed by me inperson
Patient seen and examined and evaluation completed by me in person

## 2019-09-18 NOTE — PROGRESS NOTE ADULT - PROVIDER SPECIALTY LIST ADULT
Heme/Onc
Heme/Onc
Infectious Disease
Internal Medicine
Heme/Onc

## 2019-09-18 NOTE — DISCHARGE NOTE PROVIDER - NSDCCPCAREPLAN_GEN_ALL_CORE_FT
PRINCIPAL DISCHARGE DIAGNOSIS  Diagnosis: Symptomatic anemia  Assessment and Plan of Treatment: You came in with very low blood levels and symptoms of weakness and shortness of breath. You were transfused with 4 units of blood which improved your blood levels. This was likely caused by your cancer. Please follow up with your PMD to continue to monitor your blood levels.      SECONDARY DISCHARGE DIAGNOSES  Diagnosis: Breast cancer  Assessment and Plan of Treatment: You have cancer of your right breast, confirmed by biopsy performed in February of 2018. You were seen by an oncologist Dr. Barnes in the hospital and administered chemotherapy by Dr. Pham (an interventional radiologist). Please follow up with Dr. Orr this afternoon (9/18) for radiotherapy and Dr. Barnes next week for continued chemotherapy.    Diagnosis: Sepsis  Assessment and Plan of Treatment: You had a soft tissue infection of your right breast which we treated with dressing changes and IV antibiotics. You are being discharged with two oral antibiotics: levofloxacin 500mg (to be taken daily) and flagyl 500mg (to be taken three times a day). You will need to take these medications until your breast cancer is fully treated. Please follow up with Dr. Barnes. PRINCIPAL DISCHARGE DIAGNOSIS  Diagnosis: Symptomatic anemia  Assessment and Plan of Treatment: You came in with very low blood levels and symptoms of weakness and shortness of breath. You were transfused with 4 units of blood which improved your blood levels. This was likely caused by your cancer. Please follow up with your PMD to continue to monitor your blood levels.      SECONDARY DISCHARGE DIAGNOSES  Diagnosis: Breast cancer  Assessment and Plan of Treatment: You have cancer of your right breast, confirmed by biopsy performed in February of 2018. You were seen by an oncologist Dr. Barnes in the hospital and administered chemotherapy by Dr. Pham (an interventional radiologist). Please follow up with Dr. Orr this afternoon (9/18) for radiotherapy and Dr. Barnes next week for continued chemotherapy. You can obtain your imaging results from customer service on the 3rd floor.    Diagnosis: Sepsis  Assessment and Plan of Treatment: You had a soft tissue infection of your right breast which we treated with dressing changes and IV antibiotics. You are being discharged with two oral antibiotics: levofloxacin 500mg (to be taken daily) and flagyl 500mg (to be taken three times a day). You will need to take these medications until your breast cancer is fully treated. Please follow up with Dr. Barnes.

## 2019-09-18 NOTE — DISCHARGE NOTE NURSING/CASE MANAGEMENT/SOCIAL WORK - NSDCFUADDAPPT_GEN_ALL_CORE_FT
Please see Dr. Haynes this afternoon for chemotherapy session.   Please follow up with Dr. Barnes one week after seeing Dr. Chery

## 2019-09-18 NOTE — DISCHARGE NOTE PROVIDER - HOSPITAL COURSE
Pt is a 67 yo F with PMH untreated right-sided breast cancer diagnosed in 2018, mitral valve prolapse, iron deficiency anemia who presented with progressively worsening dyspnea on exertion x1 week, admitted for anemia with hgb 3.6, sepsis 2/2 soft tissue infection of right breast, and chemotherapy.        #Anemia    Pt presented Pt is a 69 yo F with PMH untreated right-sided breast cancer diagnosed in 2018, mitral valve prolapse, iron deficiency anemia who presented with progressively worsening dyspnea on exertion x1 week, admitted for anemia with hgb 3.6, sepsis 2/2 soft tissue infection of right breast, and chemotherapy.        #Anemia    - Pt presented with symptomatic anemia with hgb 3.6 and dyspnea on exertion    - Likely 2/2 chronic disease, cancer progression    - S/p 4u pRBCs, hgb has been stable in 7-8 range        #Right-sided breast cancer    - Pt with R breast cancer diagnosed in 2018    - Biopsy (2/18) invasive poorly differentiated ductal carcinoma with areas of necrosis    - On 9/17/19 underwent intraarterial chemoinfusion with 5-FU, Gemcitabine, Oxaliplatin, and Novantrone with transcatheter embolization of axillary artery and YANELIS        #Sepsis    - Pt spiked fever to 100.5F, WBC 13.6 (2/4 SIRS) and purulent soft tissue infection of right breast    - S/p IV vancomycin and zosyn    - Plan for discharge with Levofloxacin 500mg daily, Flagyl 500mg TID Pt is a 67 yo F with PMH untreated right-sided breast cancer diagnosed in 2018, mitral valve prolapse, iron deficiency anemia who presented with progressively worsening dyspnea on exertion x1 week, admitted for anemia with hgb 3.6, sepsis 2/2 soft tissue infection of right breast, and chemotherapy.        #Anemia    - Pt presented with symptomatic anemia with hgb 3.6 and dyspnea on exertion    - Likely 2/2 chronic disease, cancer progression    - S/p 4u pRBCs, hgb has been stable in 7-8 range        #Right-sided breast cancer    - Pt with R breast cancer diagnosed in 2018    - Biopsy (2/18) invasive poorly differentiated ductal carcinoma with areas of necrosis    - On 9/17/19 underwent intraarterial chemoinfusion with 5-FU, Gemcitabine, Oxaliplatin, and Novantrone with transcatheter embolization of axillary artery and YANELIS        #Sepsis    - Pt spiked fever to 100.5F, WBC 13.6 (2/4 SIRS) and purulent soft tissue infection of right breast    - S/p IV vancomycin and zosyn    - Plan for discharge with Levofloxacin 500mg daily, Flagyl 500mg TID until cancer treatment is complete

## 2019-09-18 NOTE — PROGRESS NOTE ADULT - NSHPATTENDINGPLANDISCUSS_GEN_ALL_CORE
ED
   S
/Mariel/  S
Dr Barnes and house staff
House staff
Dr. Rio Pham and the patient in person
The patient and the house staff in person and Dr. Nathaniel Orr by text message
The patient, Dr. Rio Pham, Dr. Nathaniel Orr either in person or by telephone and text message

## 2019-09-18 NOTE — DISCHARGE NOTE NURSING/CASE MANAGEMENT/SOCIAL WORK - PATIENT PORTAL LINK FT
You can access the FollowMyHealth Patient Portal offered by [facility] by registering at the following website: [email or web address]. By joining [employer]’s FollowMyHealth portal, you will also be able to view your health information using other applications (apps) compatible with our system.

## 2019-10-03 NOTE — CONSULT NOTE ADULT - SUBJECTIVE AND OBJECTIVE BOX
Patient is a 68y old  Female who presents with a chief complaint of Symptomatic Anemia (16 Sep 2019 20:04)       HPI:  Patient is a 67yo female with PMH breast cancer (diagnosed in 2018), anemia, MVP presenting with dyspnea and chest pain on exertion and worsening fatigue. About 1 month ago she started feeling weak and tired. 2-3 weeks ago she started getting left sided chest pain and shortness of breath upon exerting herself. Pain and sob come on after walking 10 feet. She went to an Urgent Care on Saturday 9/7 and they wanted to do an EKG but patient refused. She saw Dr. Orr yesterday who did blood tests. She will be starting treatment for her breast cancer next week (pt unsure whether chemo/radiation). She received a call from him today that her Hgb was 4 and she needed to come into the hospital for a blood transfusion. She reports lightheadedness, dizziness, pain in her mid-upper back, photophobia, nasal congestion, feeling of lump in throat, decreased appetite, 50lb weight loss since 11/2018. She admits to having loose, watery BMs every other day for the past month. Says her BMs sometimes look dark red because she drinks a lot of beet juice. Last BM today was loose, watery and dark red. She also reports bowel incontinence. She was treated 3 weeks ago with a Z-pack for nasal congestion and throat pain. She has been taking herbal supplements, multivitamins and reports she has been trying to manage her breast cancer holistically. She admits to taking Advil 2-3x per week for R breast pain which helps. She denies headache, fevers, chills, abdominal pain, dysuria, hematuria.     In the ED vs: 97.6, 112/61, 94, 16, 100%   Labs: wbc 11, hgb 3.6, hct 12, plts 567, glu 112, Alb 3, PT 13.5, INR 1.19  Imaging: EKG nsr, CXR   Received: 1L NS bolus, currently receiving 1u pRBCs, another 1u pRBCs ordered   Patient will be admitted to [facility] for further management (11 Sep 2019 15:39)      PAST MEDICAL & SURGICAL HISTORY:  H/O hemorrhoids  Mitral valve prolapse  Anemia  Breast cancer  H/O inguinal hernia repair  H/O umbilical hernia repair  H/O hemorrhoidectomy      MEDICATIONS  (STANDING):  dexamethasone  IVPB 16 milliGRAM(s) IV Intermittent once  docusate sodium 100 milliGRAM(s) Oral three times a day  enoxaparin Injectable 40 milliGRAM(s) SubCutaneous daily  fluorouracil INTRA-ARTERIAL (eMAR) 1000 milliGRAM(s) IntraArterial once  gemcitabine INTRA-ARTERIAL (eMAR) 1000 milliGRAM(s) IntraArterial once  influenza   Vaccine 0.5 milliLiter(s) IntraMuscular once  mitoXANtrone IVPB (eMAR) 10 milliGRAM(s) IV Intermittent once  neomycin/BACItracin/polymyxin Topical Ointment 1 Application(s) Topical every 8 hours  ondansetron  IVPB 16 milliGRAM(s) IV Intermittent once  OXALIplatin INTRA-ARTERIAL (eMAR) 100 milliGRAM(s) IntraArterial once  pantoprazole    Tablet 40 milliGRAM(s) Oral before breakfast  piperacillin/tazobactam IVPB.. 4.5 Gram(s) IV Intermittent every 6 hours  polyethylene glycol 3350 17 Gram(s) Oral daily  senna 1 Tablet(s) Oral daily  vancomycin  IVPB 1250 milliGRAM(s) IV Intermittent every 12 hours    MEDICATIONS  (PRN):  acetaminophen   Tablet .. 325 milliGRAM(s) Oral every 4 hours PRN Temp greater or equal to 38C (100.4F), Moderate Pain (4 - 6), Severe Pain (7 - 10)  oxyCODONE    5 mG/acetaminophen 325 mG 1 Tablet(s) Oral every 6 hours PRN Moderate Pain (4 - 6)      Social History:           - Home Living Status: lives with her brother in a private house, 3 steps to enter, 8 steps inside           - Home Care Services:  X    Functional Level Prior to Admission:           - ADL's: independent           - ambulates without assistive devices    FAMILY HISTORY:  FH: hyperlipidemia: brother  FH: hypertension: mother, brother      CBC Full  -  ( 17 Sep 2019 05:49 )  WBC Count : 10.36 K/uL  RBC Count : 3.16 M/uL  Hemoglobin : 7.6 g/dL  Hematocrit : 25.7 %  Platelet Count - Automated : 433 K/uL  Mean Cell Volume : 81.3 fl  Mean Cell Hemoglobin : 24.1 pg  Mean Cell Hemoglobin Concentration : 29.6 gm/dL  Auto Neutrophil # : x  Auto Lymphocyte # : x  Auto Monocyte # : x  Auto Eosinophil # : x  Auto Basophil # : x  Auto Neutrophil % : x  Auto Lymphocyte % : x  Auto Monocyte % : x  Auto Eosinophil % : x  Auto Basophil % : x      09-17    136  |  104  |  9   ----------------------------<  105<H>  4.0   |  21<L>  |  0.86    Ca    8.7      17 Sep 2019 05:49  Mg     2.1     09-17              Radiology:    < from: CT Angio Chest w/ IV Cont (09.12.19 @ 17:56) >  EXAM:  CT ANGIO CHEST (W)AW IC                          PROCEDURE DATE:  09/12/2019          INTERPRETATION:    CTA (CT angiography) of the CHEST    INDICATION: Breast cancer with bleeding wound. Anemia with hemoglobin of   5.8.    TECHNIQUE: CTA (CT angiography) of the chest was performed, with images   obtained both 25 seconds and 90 seconds following the administration of   intravenous contrast material. Image post-processing was performed   including the production of axial, coronal, and sagittal reformatted   images and coronal and sagittal maximum intensity projections (MIPs) of   the chest.     PRIOR STUDIES: No prior CT scan available for comparison.    FINDINGS:    There is a 14.2 x 6.7 x 16.0 cm cavitary mass arising from the right   breast, consistent with breast cancer. No active bleeding identified.   There is gas present within the mass, raising suspicion for infection.   Infiltration of the subcutaneous fat in the right breast and right axilla   with skin thickening rightbreast also present. Edema present within soft   tissues of right axilla. Large right axillary lymphadenopathy, with some   of the lymph nodes necrotic. Largest lymph node measures 6.2 x 4.0 cm.   The right breast mass and right axillary adenopathy are hypervascular,   with innumerable feeding arteries and draining veins. The arteries arise   from intercostal branches of the right internal mammary artery as well as   from branches of the right subclavian and axillary arteries. Additional   arterial branches extending inferiorly from the neck, with their origins   not identified on this exam. There are a few 0.8 cm right subpectoral   lymph nodes. There is also mild left axillary lymphadenopathy, with the   largest lymph node measuring 1.1 cm.In the right internal mammary chain   there are two 0.5 cm lymph nodes.    There is smooth interlobular septal thickening in the upper lobes   bilaterally. Trace bilateral pleural effusions are present with   associated compressive atelectasis of small portions of each lower lobe.   A 0.4 cm diffusely calcified, benign-appearing nodule is present in the   right lower lobe.    Heart size within normal limits. No pericardial effusion. Small calcified   plaque aorta.    No mediastinal or hilar lymphadenopathy.    Images of the upper abdomen are unremarkable.    No suspicious bone lesion.        IMPRESSION:  1. No active bleeding identified.    2. There is a 16.0 cm cavitary mass in the right breast, consistent with   breast cancer. Gas is present within the mass, raising suspicion for   infection.    3. Large lymphadenopathy right axilla with mild adenopathy in right   subpectoral region, right internal mammary chain, and left axilla.    4. Smooth interlobular septal thickening in each upperlobe. The   differential diagnosis includes lymphangitic carcinomatosis and pulmonary   edema.    5. Trace bilateral pleural effusions.        < from: NM PET/CT Onc FDG Skull to Thigh, Inital (09.13.19 @ 15:44) >  EXAM:  PETCT [facility] ONC FDG INIT                          PROCEDURE DATE:  09/13/2019          INTERPRETATION:  PET-CT Scan    History: Right breast invasive ductal carcinoma. Positive lymph nodes.   Anemia of 3.6. Baseline study to help determine initial treatment   strategy.       Procedure: Following at least 4 hour fasting, the patient's blood glucose   was 107 mg/dl.  The patient was injected with 10.5 mCi of F-18-FDG.     After resting in a quiet room for about one hour, the patient was  positioned on the scanning table and images were obtained using standard   PET/CT technique from the mid thigh to the axilla.  This acquisition was   performed with the patient's arms lifted as high as possible over the   head.    A second PET/CT acquisition of the head and neck was then performed with   the patient's arms at the side.    Simultaneous low-dose CT scanning is used for attenuation correction and   localization.    PET images were reconstructed in axial, sagittal and coronal planes using   CT based attenuation correction.  Images were displayed as PET, CT and   fused data sets as well as maximum intensity pixel projections.    The standard uptake values (SUV) reported below are maximum values within   the region of interest, expressed in gm/mL.    Comparison: CTA chest from 9/12/2019.    Findings:     Head and neck: Normal.  Chest wall: FDG avid (SUV 12.4) 16.1 x 8.5 cm right breast cavitary mass   with a few foci of gas. There is associated skin thickening and   subcutaneous edema. Multiple FDG avid right axillary lymph nodes, the   largest measuring 6.3 cm with an SUV of 9.6. There are multiple FDG avid   left axillary lymph nodes measuring up to 1.9 cm (SUV 5.8). There are   multiple FDG avid subcentimeter lymph nodes in the right subpectoral   region (high SUV 3.3). There is a 1.2 cm FDG avid (SUV 4.1) right   supraclavicular node.     Lungs and large airways: Mild interlobular septal thickening. 2 mm   calcified nodule right lower lobe compatible calcified granuloma.    Pleura:  There is mild FDG uptake within a small right pleural effusion   (SUV 2.3). There is a non-FDG avid trace left pleural effusion..    Mediastinum and hilar regions: There is mild FDG uptake within the right   perihilar region (SUV 2.8) (series 4 image 32).    Heart and pericardium:  Heart size is normal. No pericardial effusion.    Vessels:  Small calcified plaque aorta.    Liver:  Normal.    Gallbladder: No radiopaque stones gallbladder.    Spleen:  Normal.    Pancreas:  Normal.    Adrenal glands:  Increased FDG uptake in the adrenal glands, can be   physiologic.    Kidneys: Normal.    Abdominal and pelvic adenopathy:  No lymphadenopathy in abdomen or pelvis.    Ascites: None.    Gastrointestinal tract: Normal.    Pelvic organs: 2.4 cm left ovarian cyst without significant FDG activity.   Uterus and right adnexa unremarkable.    Soft tissues: Normal.    Bones: Normal.      Impression:     Large FDG avid right breast mass corresponding to patient's known right   breast cancer. Again noted is gas within the mass and infection cannot be   excluded.    FDG avid bilateral axillary, right supraclavicular and right subpectoral   lymph nodes suspicious for metastatic disease.    Mild FDG uptake within a small right pleural effusion which may be due to   infectious/inflammatory changes of the neoplasm cannot be excluded. Mild   FDG uptake within the right perihilar region which is nonspecific and may   represent a small right hilar lymph node.          Vital Signs Last 24 Hrs  T(C): 36.9 (17 Sep 2019 06:06), Max: 37.2 (16 Sep 2019 11:49)  T(F): 98.5 (17 Sep 2019 06:06), Max: 99 (16 Sep 2019 11:49)  HR: 75 (17 Sep 2019 06:06) (73 - 87)  BP: 94/57 (17 Sep 2019 06:06) (94/57 - 101/65)  BP(mean): --  RR: 17 (17 Sep 2019 06:06) (17 - 18)  SpO2: 97% (17 Sep 2019 06:06) (97% - 100%)    REVIEW OF SYSTEMS:    CONSTITUTIONAL: fatigue  EYES: No eye pain, visual disturbances, or discharge  ENMT:  No difficulty hearing, tinnitus, vertigo; No sinus or throat pain  NECK: No pain or stiffness  BREASTS: No pain, masses, or nipple discharge  RESPIRATORY: No cough, wheezing, chills or hemoptysis; No shortness of breath  CARDIOVASCULAR: No chest pain, palpitations, dizziness, or leg swelling  GASTROINTESTINAL: No abdominal or epigastric pain. No nausea, vomiting, or hematemesis; No diarrhea or constipation. No melena or hematochezia.  GENITOURINARY: No dysuria, frequency, hematuria, or incontinence  NEUROLOGICAL: No headaches, memory loss, loss of strength, numbness, or tremors  SKIN: No itching, burning, rashes, or lesions   LYMPH NODES: No enlarged glands  ENDOCRINE: No heat or cold intolerance; No hair loss  MUSCULOSKELETAL: No joint pain or swelling; No muscle, back, or extremity pain  PSYCHIATRIC: No depression, anxiety, mood swings, or difficulty sleeping  HEME/LYMPH: No easy bruising, or bleeding gums  ALLERGY AND IMMUNOLOGIC: No hives or eczema  VASCULAR: no swelling, erythema      Physical Exam: 67 yo woman lying in semi Kent's position, c/o feeling tired    Head: normocephalic, atraumatic    Eyes: PERRLA, EOMI, no nystagmus, sclera anicteric    ENT: nasal discharge, uvula midline, no oropharyngeal erythema/exudate    Neck: supple, negative JVD, negative carotid bruits, no thyromegaly    Chest: slight crackles at bases, Right dressing    Cardiovascular: regular rate and rhythm, neg murmurs/rubs/gallops    Abdomen: soft, non distended, non tender, negative rebound/guarding, normal bowel sounds, neg hepatosplenomegaly    Extremities: WWP, neg cyanosis/clubbing/edema, negative calf tenderness to palpation, negative Carl's sign    :     Neurologic Exam:    Motor Exam:    Upper Extremities:     RIght:    4/5    Left :   4/5    Lower Extremities:                 Right:       4/5                 Left:        4/5                   Sensory:    intact to LT/PP in all UE/LE dermatomes    DTR:            = biceps/     triceps/     brachioradialis                      = patella/   medial hamstring/ankle                      neg clonus                      neg Babinski                      Gait:  not tested        PM&R Impression:    1) deconditioned  2) no focal weakness        Recommendations:    1) Physical therapy focusing on therapeutic exercises, bed mobility/transfer out of bed evaluation, progressive ambulation with assistive devices prn.    2) Anticipated Disposition Plan/Recs: d/c home with no post discharge rehab needs No complaints No complaints No complaints No complaints No complaints

## 2019-10-23 PROBLEM — C50.919 MALIGNANT NEOPLASM OF UNSPECIFIED SITE OF UNSPECIFIED FEMALE BREAST: Chronic | Status: ACTIVE | Noted: 2019-01-01

## 2019-10-23 PROBLEM — I34.1 NONRHEUMATIC MITRAL (VALVE) PROLAPSE: Chronic | Status: ACTIVE | Noted: 2019-01-01

## 2019-10-23 PROBLEM — D64.9 ANEMIA, UNSPECIFIED: Chronic | Status: ACTIVE | Noted: 2019-01-01

## 2019-10-23 PROBLEM — Z87.19 PERSONAL HISTORY OF OTHER DISEASES OF THE DIGESTIVE SYSTEM: Chronic | Status: ACTIVE | Noted: 2019-01-01

## 2020-01-01 ENCOUNTER — TRANSCRIPTION ENCOUNTER (OUTPATIENT)
Age: 70
End: 2020-01-01

## 2020-01-01 ENCOUNTER — INPATIENT (INPATIENT)
Facility: HOSPITAL | Age: 70
LOS: 8 days | Discharge: NURSING, REHAB OR LONG-TERM CARE | DRG: 843 | End: 2020-06-12
Attending: HOSPITALIST | Admitting: STUDENT IN AN ORGANIZED HEALTH CARE EDUCATION/TRAINING PROGRAM
Payer: MEDICARE

## 2020-01-01 ENCOUNTER — INPATIENT (INPATIENT)
Facility: HOSPITAL | Age: 70
LOS: 3 days | DRG: 597 | End: 2020-06-25
Attending: STUDENT IN AN ORGANIZED HEALTH CARE EDUCATION/TRAINING PROGRAM | Admitting: HOSPITALIST
Payer: MEDICARE

## 2020-01-01 VITALS
RESPIRATION RATE: 20 BRPM | TEMPERATURE: 100 F | HEIGHT: 66 IN | SYSTOLIC BLOOD PRESSURE: 111 MMHG | WEIGHT: 179.9 LBS | HEART RATE: 109 BPM | OXYGEN SATURATION: 98 % | DIASTOLIC BLOOD PRESSURE: 66 MMHG

## 2020-01-01 VITALS
TEMPERATURE: 98 F | OXYGEN SATURATION: 91 % | DIASTOLIC BLOOD PRESSURE: 56 MMHG | SYSTOLIC BLOOD PRESSURE: 84 MMHG | RESPIRATION RATE: 24 BRPM | HEART RATE: 113 BPM

## 2020-01-01 VITALS
HEART RATE: 111 BPM | RESPIRATION RATE: 18 BRPM | DIASTOLIC BLOOD PRESSURE: 80 MMHG | TEMPERATURE: 99 F | SYSTOLIC BLOOD PRESSURE: 128 MMHG | OXYGEN SATURATION: 94 %

## 2020-01-01 VITALS
DIASTOLIC BLOOD PRESSURE: 83 MMHG | HEART RATE: 113 BPM | OXYGEN SATURATION: 100 % | SYSTOLIC BLOOD PRESSURE: 128 MMHG | RESPIRATION RATE: 30 BRPM | WEIGHT: 161.6 LBS | TEMPERATURE: 98 F

## 2020-01-01 DIAGNOSIS — R06.02 SHORTNESS OF BREATH: ICD-10-CM

## 2020-01-01 DIAGNOSIS — R52 PAIN, UNSPECIFIED: ICD-10-CM

## 2020-01-01 DIAGNOSIS — R65.11 SYSTEMIC INFLAMMATORY RESPONSE SYNDROME (SIRS) OF NON-INFECTIOUS ORIGIN WITH ACUTE ORGAN DYSFUNCTION: ICD-10-CM

## 2020-01-01 DIAGNOSIS — R06.00 DYSPNEA, UNSPECIFIED: ICD-10-CM

## 2020-01-01 DIAGNOSIS — Z71.89 OTHER SPECIFIED COUNSELING: ICD-10-CM

## 2020-01-01 DIAGNOSIS — C79.9 SECONDARY MALIGNANT NEOPLASM OF UNSPECIFIED SITE: ICD-10-CM

## 2020-01-01 DIAGNOSIS — D64.9 ANEMIA, UNSPECIFIED: ICD-10-CM

## 2020-01-01 DIAGNOSIS — R06.82 TACHYPNEA, NOT ELSEWHERE CLASSIFIED: ICD-10-CM

## 2020-01-01 DIAGNOSIS — G93.40 ENCEPHALOPATHY, UNSPECIFIED: ICD-10-CM

## 2020-01-01 DIAGNOSIS — C50.919 MALIGNANT NEOPLASM OF UNSPECIFIED SITE OF UNSPECIFIED FEMALE BREAST: ICD-10-CM

## 2020-01-01 DIAGNOSIS — Z98.890 OTHER SPECIFIED POSTPROCEDURAL STATES: Chronic | ICD-10-CM

## 2020-01-01 DIAGNOSIS — I82.409 ACUTE EMBOLISM AND THROMBOSIS OF UNSPECIFIED DEEP VEINS OF UNSPECIFIED LOWER EXTREMITY: ICD-10-CM

## 2020-01-01 DIAGNOSIS — S21.001A UNSPECIFIED OPEN WOUND OF RIGHT BREAST, INITIAL ENCOUNTER: ICD-10-CM

## 2020-01-01 DIAGNOSIS — J90 PLEURAL EFFUSION, NOT ELSEWHERE CLASSIFIED: ICD-10-CM

## 2020-01-01 DIAGNOSIS — J96.01 ACUTE RESPIRATORY FAILURE WITH HYPOXIA: ICD-10-CM

## 2020-01-01 DIAGNOSIS — Z51.5 ENCOUNTER FOR PALLIATIVE CARE: ICD-10-CM

## 2020-01-01 DIAGNOSIS — I82.621 ACUTE EMBOLISM AND THROMBOSIS OF DEEP VEINS OF RIGHT UPPER EXTREMITY: ICD-10-CM

## 2020-01-01 DIAGNOSIS — R09.89 OTHER SPECIFIED SYMPTOMS AND SIGNS INVOLVING THE CIRCULATORY AND RESPIRATORY SYSTEMS: ICD-10-CM

## 2020-01-01 DIAGNOSIS — C79.31 SECONDARY MALIGNANT NEOPLASM OF BRAIN: ICD-10-CM

## 2020-01-01 DIAGNOSIS — Z29.9 ENCOUNTER FOR PROPHYLACTIC MEASURES, UNSPECIFIED: ICD-10-CM

## 2020-01-01 DIAGNOSIS — R50.9 FEVER, UNSPECIFIED: ICD-10-CM

## 2020-01-01 DIAGNOSIS — R53.83 OTHER FATIGUE: ICD-10-CM

## 2020-01-01 DIAGNOSIS — Z02.9 ENCOUNTER FOR ADMINISTRATIVE EXAMINATIONS, UNSPECIFIED: ICD-10-CM

## 2020-01-01 LAB
ALBUMIN SERPL ELPH-MCNC: 2.8 G/DL — LOW (ref 3.3–5)
ALBUMIN SERPL ELPH-MCNC: 2.8 G/DL — LOW (ref 3.3–5)
ALBUMIN SERPL ELPH-MCNC: 2.9 G/DL — LOW (ref 3.3–5)
ALP SERPL-CCNC: 115 U/L — SIGNIFICANT CHANGE UP (ref 40–120)
ALP SERPL-CCNC: 130 U/L — HIGH (ref 40–120)
ALP SERPL-CCNC: 137 U/L — HIGH (ref 40–120)
ALT FLD-CCNC: 14 U/L — SIGNIFICANT CHANGE UP (ref 10–45)
ALT FLD-CCNC: 18 U/L — SIGNIFICANT CHANGE UP (ref 10–45)
ALT FLD-CCNC: 21 U/L — SIGNIFICANT CHANGE UP (ref 10–45)
ANION GAP SERPL CALC-SCNC: 11 MMOL/L — SIGNIFICANT CHANGE UP (ref 5–17)
ANION GAP SERPL CALC-SCNC: 12 MMOL/L — SIGNIFICANT CHANGE UP (ref 5–17)
ANION GAP SERPL CALC-SCNC: 12 MMOL/L — SIGNIFICANT CHANGE UP (ref 5–17)
ANION GAP SERPL CALC-SCNC: 13 MMOL/L — SIGNIFICANT CHANGE UP (ref 5–17)
ANION GAP SERPL CALC-SCNC: 21 MMOL/L — HIGH (ref 5–17)
ANISOCYTOSIS BLD QL: SIGNIFICANT CHANGE UP
APPEARANCE UR: CLEAR — SIGNIFICANT CHANGE UP
APPEARANCE UR: CLEAR — SIGNIFICANT CHANGE UP
APTT BLD: 28.9 SEC — SIGNIFICANT CHANGE UP (ref 27.5–36.3)
APTT BLD: 28.9 SEC — SIGNIFICANT CHANGE UP (ref 27.5–36.3)
APTT BLD: 29.1 SEC — SIGNIFICANT CHANGE UP (ref 27.5–36.3)
APTT BLD: 49.2 SEC — HIGH (ref 27.5–36.3)
APTT BLD: 55.3 SEC — HIGH (ref 27.5–36.3)
APTT BLD: 63.1 SEC — HIGH (ref 27.5–36.3)
APTT BLD: 65.8 SEC — HIGH (ref 27.5–36.3)
AST SERPL-CCNC: 57 U/L — HIGH (ref 10–40)
AST SERPL-CCNC: 62 U/L — HIGH (ref 10–40)
AST SERPL-CCNC: 72 U/L — HIGH (ref 10–40)
BASE EXCESS BLDV CALC-SCNC: 4.4 MMOL/L — HIGH (ref -2–2)
BASE EXCESS BLDV CALC-SCNC: 5 MMOL/L — HIGH (ref -2–2)
BASE EXCESS BLDV CALC-SCNC: 5.9 MMOL/L — HIGH (ref -2–2)
BASOPHILS # BLD AUTO: 0 K/UL — SIGNIFICANT CHANGE UP (ref 0–0.2)
BASOPHILS # BLD AUTO: 0.01 K/UL — SIGNIFICANT CHANGE UP (ref 0–0.2)
BASOPHILS # BLD AUTO: 0.02 K/UL — SIGNIFICANT CHANGE UP (ref 0–0.2)
BASOPHILS NFR BLD AUTO: 0 % — SIGNIFICANT CHANGE UP (ref 0–2)
BASOPHILS NFR BLD AUTO: 0.1 % — SIGNIFICANT CHANGE UP (ref 0–2)
BASOPHILS NFR BLD AUTO: 0.2 % — SIGNIFICANT CHANGE UP (ref 0–2)
BILIRUB SERPL-MCNC: 0.3 MG/DL — SIGNIFICANT CHANGE UP (ref 0.2–1.2)
BILIRUB SERPL-MCNC: 0.4 MG/DL — SIGNIFICANT CHANGE UP (ref 0.2–1.2)
BILIRUB SERPL-MCNC: 0.5 MG/DL — SIGNIFICANT CHANGE UP (ref 0.2–1.2)
BILIRUB UR-MCNC: NEGATIVE — SIGNIFICANT CHANGE UP
BILIRUB UR-MCNC: NEGATIVE — SIGNIFICANT CHANGE UP
BLD GP AB SCN SERPL QL: NEGATIVE — SIGNIFICANT CHANGE UP
BLD GP AB SCN SERPL QL: NEGATIVE — SIGNIFICANT CHANGE UP
BUN SERPL-MCNC: 12 MG/DL — SIGNIFICANT CHANGE UP (ref 7–23)
BUN SERPL-MCNC: 13 MG/DL — SIGNIFICANT CHANGE UP (ref 7–23)
BUN SERPL-MCNC: 14 MG/DL — SIGNIFICANT CHANGE UP (ref 7–23)
BUN SERPL-MCNC: 14 MG/DL — SIGNIFICANT CHANGE UP (ref 7–23)
BUN SERPL-MCNC: 16 MG/DL — SIGNIFICANT CHANGE UP (ref 7–23)
BUN SERPL-MCNC: 9 MG/DL — SIGNIFICANT CHANGE UP (ref 7–23)
BUN SERPL-MCNC: 9 MG/DL — SIGNIFICANT CHANGE UP (ref 7–23)
CA-I SERPL-SCNC: 1.16 MMOL/L — SIGNIFICANT CHANGE UP (ref 1.12–1.3)
CA-I SERPL-SCNC: 1.18 MMOL/L — SIGNIFICANT CHANGE UP (ref 1.12–1.3)
CA-I SERPL-SCNC: 1.2 MMOL/L — SIGNIFICANT CHANGE UP (ref 1.12–1.3)
CALCIUM SERPL-MCNC: 8.5 MG/DL — SIGNIFICANT CHANGE UP (ref 8.4–10.5)
CALCIUM SERPL-MCNC: 9 MG/DL — SIGNIFICANT CHANGE UP (ref 8.4–10.5)
CALCIUM SERPL-MCNC: 9.3 MG/DL — SIGNIFICANT CHANGE UP (ref 8.4–10.5)
CHLORIDE BLDV-SCNC: 102 MMOL/L — SIGNIFICANT CHANGE UP (ref 96–108)
CHLORIDE BLDV-SCNC: 104 MMOL/L — SIGNIFICANT CHANGE UP (ref 96–108)
CHLORIDE BLDV-SCNC: 105 MMOL/L — SIGNIFICANT CHANGE UP (ref 96–108)
CHLORIDE SERPL-SCNC: 101 MMOL/L — SIGNIFICANT CHANGE UP (ref 96–108)
CHLORIDE SERPL-SCNC: 102 MMOL/L — SIGNIFICANT CHANGE UP (ref 96–108)
CHLORIDE SERPL-SCNC: 104 MMOL/L — SIGNIFICANT CHANGE UP (ref 96–108)
CHLORIDE SERPL-SCNC: 109 MMOL/L — HIGH (ref 96–108)
CO2 BLDV-SCNC: 30 MMOL/L — SIGNIFICANT CHANGE UP (ref 22–30)
CO2 BLDV-SCNC: 31 MMOL/L — HIGH (ref 22–30)
CO2 BLDV-SCNC: 32 MMOL/L — HIGH (ref 22–30)
CO2 SERPL-SCNC: 20 MMOL/L — LOW (ref 22–31)
CO2 SERPL-SCNC: 24 MMOL/L — SIGNIFICANT CHANGE UP (ref 22–31)
CO2 SERPL-SCNC: 24 MMOL/L — SIGNIFICANT CHANGE UP (ref 22–31)
CO2 SERPL-SCNC: 25 MMOL/L — SIGNIFICANT CHANGE UP (ref 22–31)
CO2 SERPL-SCNC: 25 MMOL/L — SIGNIFICANT CHANGE UP (ref 22–31)
CO2 SERPL-SCNC: 26 MMOL/L — SIGNIFICANT CHANGE UP (ref 22–31)
CO2 SERPL-SCNC: 27 MMOL/L — SIGNIFICANT CHANGE UP (ref 22–31)
COLOR SPEC: YELLOW — SIGNIFICANT CHANGE UP
COLOR SPEC: YELLOW — SIGNIFICANT CHANGE UP
CREAT SERPL-MCNC: 0.39 MG/DL — LOW (ref 0.5–1.3)
CREAT SERPL-MCNC: 0.52 MG/DL — SIGNIFICANT CHANGE UP (ref 0.5–1.3)
CREAT SERPL-MCNC: 0.54 MG/DL — SIGNIFICANT CHANGE UP (ref 0.5–1.3)
CREAT SERPL-MCNC: 0.56 MG/DL — SIGNIFICANT CHANGE UP (ref 0.5–1.3)
CREAT SERPL-MCNC: 0.56 MG/DL — SIGNIFICANT CHANGE UP (ref 0.5–1.3)
CREAT SERPL-MCNC: 0.58 MG/DL — SIGNIFICANT CHANGE UP (ref 0.5–1.3)
CREAT SERPL-MCNC: 0.68 MG/DL — SIGNIFICANT CHANGE UP (ref 0.5–1.3)
CULTURE RESULTS: SIGNIFICANT CHANGE UP
CULTURE RESULTS: SIGNIFICANT CHANGE UP
DACRYOCYTES BLD QL SMEAR: SLIGHT — SIGNIFICANT CHANGE UP
DIFF PNL FLD: NEGATIVE — SIGNIFICANT CHANGE UP
DIFF PNL FLD: NEGATIVE — SIGNIFICANT CHANGE UP
ELLIPTOCYTES BLD QL SMEAR: SLIGHT — SIGNIFICANT CHANGE UP
EOSINOPHIL # BLD AUTO: 0 K/UL — SIGNIFICANT CHANGE UP (ref 0–0.5)
EOSINOPHIL # BLD AUTO: 0.04 K/UL — SIGNIFICANT CHANGE UP (ref 0–0.5)
EOSINOPHIL # BLD AUTO: 0.05 K/UL — SIGNIFICANT CHANGE UP (ref 0–0.5)
EOSINOPHIL NFR BLD AUTO: 0 % — SIGNIFICANT CHANGE UP (ref 0–6)
EOSINOPHIL NFR BLD AUTO: 0.4 % — SIGNIFICANT CHANGE UP (ref 0–6)
EOSINOPHIL NFR BLD AUTO: 0.6 % — SIGNIFICANT CHANGE UP (ref 0–6)
FERRITIN SERPL-MCNC: 1047 NG/ML — HIGH (ref 15–150)
FOLATE SERPL-MCNC: 8.6 NG/ML — SIGNIFICANT CHANGE UP
GAS PNL BLDV: 138 MMOL/L — SIGNIFICANT CHANGE UP (ref 135–145)
GAS PNL BLDV: 138 MMOL/L — SIGNIFICANT CHANGE UP (ref 135–145)
GAS PNL BLDV: 140 MMOL/L — SIGNIFICANT CHANGE UP (ref 135–145)
GAS PNL BLDV: SIGNIFICANT CHANGE UP
GLUCOSE BLDC GLUCOMTR-MCNC: 133 MG/DL — HIGH (ref 70–99)
GLUCOSE BLDC GLUCOMTR-MCNC: 146 MG/DL — HIGH (ref 70–99)
GLUCOSE BLDV-MCNC: 109 MG/DL — HIGH (ref 70–99)
GLUCOSE BLDV-MCNC: 110 MG/DL — HIGH (ref 70–99)
GLUCOSE BLDV-MCNC: 144 MG/DL — HIGH (ref 70–99)
GLUCOSE SERPL-MCNC: 101 MG/DL — HIGH (ref 70–99)
GLUCOSE SERPL-MCNC: 104 MG/DL — HIGH (ref 70–99)
GLUCOSE SERPL-MCNC: 108 MG/DL — HIGH (ref 70–99)
GLUCOSE SERPL-MCNC: 116 MG/DL — HIGH (ref 70–99)
GLUCOSE SERPL-MCNC: 127 MG/DL — HIGH (ref 70–99)
GLUCOSE SERPL-MCNC: 135 MG/DL — HIGH (ref 70–99)
GLUCOSE SERPL-MCNC: 150 MG/DL — HIGH (ref 70–99)
GLUCOSE UR QL: NEGATIVE — SIGNIFICANT CHANGE UP
GLUCOSE UR QL: NEGATIVE — SIGNIFICANT CHANGE UP
HCO3 BLDV-SCNC: 28 MMOL/L — SIGNIFICANT CHANGE UP (ref 21–29)
HCO3 BLDV-SCNC: 30 MMOL/L — HIGH (ref 21–29)
HCO3 BLDV-SCNC: 30 MMOL/L — HIGH (ref 21–29)
HCT VFR BLD CALC: 21 % — CRITICAL LOW (ref 34.5–45)
HCT VFR BLD CALC: 24.6 % — LOW (ref 34.5–45)
HCT VFR BLD CALC: 25 % — LOW (ref 34.5–45)
HCT VFR BLD CALC: 25.4 % — LOW (ref 34.5–45)
HCT VFR BLD CALC: 25.4 % — LOW (ref 34.5–45)
HCT VFR BLD CALC: 25.9 % — LOW (ref 34.5–45)
HCT VFR BLD CALC: 26 % — LOW (ref 34.5–45)
HCT VFR BLD CALC: 26 % — LOW (ref 34.5–45)
HCT VFR BLD CALC: 26.5 % — LOW (ref 34.5–45)
HCT VFR BLD CALC: 26.7 % — LOW (ref 34.5–45)
HCT VFR BLD CALC: 26.8 % — LOW (ref 34.5–45)
HCT VFR BLD CALC: 27.1 % — LOW (ref 34.5–45)
HCT VFR BLD CALC: 29.3 % — LOW (ref 34.5–45)
HCT VFR BLDA CALC: 21 % — CRITICAL LOW (ref 39–50)
HCT VFR BLDA CALC: 25 % — LOW (ref 39–50)
HCT VFR BLDA CALC: 28 % — LOW (ref 39–50)
HGB BLD CALC-MCNC: 6.8 G/DL — CRITICAL LOW (ref 11.5–15.5)
HGB BLD CALC-MCNC: 7.9 G/DL — LOW (ref 11.5–15.5)
HGB BLD CALC-MCNC: 9.2 G/DL — LOW (ref 11.5–15.5)
HGB BLD-MCNC: 6.4 G/DL — CRITICAL LOW (ref 11.5–15.5)
HGB BLD-MCNC: 7.6 G/DL — LOW (ref 11.5–15.5)
HGB BLD-MCNC: 7.7 G/DL — LOW (ref 11.5–15.5)
HGB BLD-MCNC: 7.8 G/DL — LOW (ref 11.5–15.5)
HGB BLD-MCNC: 7.8 G/DL — LOW (ref 11.5–15.5)
HGB BLD-MCNC: 7.9 G/DL — LOW (ref 11.5–15.5)
HGB BLD-MCNC: 8 G/DL — LOW (ref 11.5–15.5)
HGB BLD-MCNC: 8.1 G/DL — LOW (ref 11.5–15.5)
HGB BLD-MCNC: 8.2 G/DL — LOW (ref 11.5–15.5)
HGB BLD-MCNC: 8.4 G/DL — LOW (ref 11.5–15.5)
HGB BLD-MCNC: 8.7 G/DL — LOW (ref 11.5–15.5)
HYPOCHROMIA BLD QL: SIGNIFICANT CHANGE UP
IMM GRANULOCYTES NFR BLD AUTO: 0.5 % — SIGNIFICANT CHANGE UP (ref 0–1.5)
IMM GRANULOCYTES NFR BLD AUTO: 0.6 % — SIGNIFICANT CHANGE UP (ref 0–1.5)
IMM GRANULOCYTES NFR BLD AUTO: 0.6 % — SIGNIFICANT CHANGE UP (ref 0–1.5)
INR BLD: 1.23 RATIO — HIGH (ref 0.88–1.16)
INR BLD: 1.24 RATIO — HIGH (ref 0.88–1.16)
IRON SATN MFR SERPL: 14 % — SIGNIFICANT CHANGE UP (ref 14–50)
IRON SATN MFR SERPL: 30 UG/DL — SIGNIFICANT CHANGE UP (ref 30–160)
KETONES UR-MCNC: ABNORMAL
KETONES UR-MCNC: NEGATIVE — SIGNIFICANT CHANGE UP
LACTATE BLDV-MCNC: 1.6 MMOL/L — SIGNIFICANT CHANGE UP (ref 0.7–2)
LACTATE BLDV-MCNC: 1.9 MMOL/L — SIGNIFICANT CHANGE UP (ref 0.7–2)
LACTATE BLDV-MCNC: 2.1 MMOL/L — HIGH (ref 0.7–2)
LACTATE SERPL-SCNC: 1 MMOL/L — SIGNIFICANT CHANGE UP (ref 0.7–2)
LACTATE SERPL-SCNC: 2.2 MMOL/L — HIGH (ref 0.7–2)
LDH SERPL L TO P-CCNC: 580 U/L — HIGH (ref 50–242)
LEUKOCYTE ESTERASE UR-ACNC: ABNORMAL
LEUKOCYTE ESTERASE UR-ACNC: NEGATIVE — SIGNIFICANT CHANGE UP
LYMPHOCYTES # BLD AUTO: 0.51 K/UL — LOW (ref 1–3.3)
LYMPHOCYTES # BLD AUTO: 0.52 K/UL — LOW (ref 1–3.3)
LYMPHOCYTES # BLD AUTO: 0.63 K/UL — LOW (ref 1–3.3)
LYMPHOCYTES # BLD AUTO: 5.1 % — LOW (ref 13–44)
LYMPHOCYTES # BLD AUTO: 5.4 % — LOW (ref 13–44)
LYMPHOCYTES # BLD AUTO: 7.8 % — LOW (ref 13–44)
MAGNESIUM SERPL-MCNC: 2.4 MG/DL — SIGNIFICANT CHANGE UP (ref 1.6–2.6)
MANUAL SMEAR VERIFICATION: SIGNIFICANT CHANGE UP
MCHC RBC-ENTMCNC: 23.1 PG — LOW (ref 27–34)
MCHC RBC-ENTMCNC: 23.7 PG — LOW (ref 27–34)
MCHC RBC-ENTMCNC: 23.8 PG — LOW (ref 27–34)
MCHC RBC-ENTMCNC: 24 PG — LOW (ref 27–34)
MCHC RBC-ENTMCNC: 24 PG — LOW (ref 27–34)
MCHC RBC-ENTMCNC: 24.1 PG — LOW (ref 27–34)
MCHC RBC-ENTMCNC: 24.2 PG — LOW (ref 27–34)
MCHC RBC-ENTMCNC: 24.3 PG — LOW (ref 27–34)
MCHC RBC-ENTMCNC: 24.4 PG — LOW (ref 27–34)
MCHC RBC-ENTMCNC: 24.5 PG — LOW (ref 27–34)
MCHC RBC-ENTMCNC: 29.7 GM/DL — LOW (ref 32–36)
MCHC RBC-ENTMCNC: 29.8 GM/DL — LOW (ref 32–36)
MCHC RBC-ENTMCNC: 29.9 GM/DL — LOW (ref 32–36)
MCHC RBC-ENTMCNC: 29.9 GM/DL — LOW (ref 32–36)
MCHC RBC-ENTMCNC: 30.2 GM/DL — LOW (ref 32–36)
MCHC RBC-ENTMCNC: 30.3 GM/DL — LOW (ref 32–36)
MCHC RBC-ENTMCNC: 30.4 GM/DL — LOW (ref 32–36)
MCHC RBC-ENTMCNC: 30.4 GM/DL — LOW (ref 32–36)
MCHC RBC-ENTMCNC: 30.5 GM/DL — LOW (ref 32–36)
MCHC RBC-ENTMCNC: 30.5 GM/DL — LOW (ref 32–36)
MCHC RBC-ENTMCNC: 30.8 GM/DL — LOW (ref 32–36)
MCHC RBC-ENTMCNC: 31.2 GM/DL — LOW (ref 32–36)
MCHC RBC-ENTMCNC: 31.2 GM/DL — LOW (ref 32–36)
MCHC RBC-ENTMCNC: 31.3 GM/DL — LOW (ref 32–36)
MCHC RBC-ENTMCNC: 31.5 GM/DL — LOW (ref 32–36)
MCV RBC AUTO: 75.8 FL — LOW (ref 80–100)
MCV RBC AUTO: 77.4 FL — LOW (ref 80–100)
MCV RBC AUTO: 77.5 FL — LOW (ref 80–100)
MCV RBC AUTO: 77.8 FL — LOW (ref 80–100)
MCV RBC AUTO: 77.9 FL — LOW (ref 80–100)
MCV RBC AUTO: 78.1 FL — LOW (ref 80–100)
MCV RBC AUTO: 78.5 FL — LOW (ref 80–100)
MCV RBC AUTO: 79.3 FL — LOW (ref 80–100)
MCV RBC AUTO: 79.4 FL — LOW (ref 80–100)
MCV RBC AUTO: 79.4 FL — LOW (ref 80–100)
MCV RBC AUTO: 79.5 FL — LOW (ref 80–100)
MCV RBC AUTO: 79.7 FL — LOW (ref 80–100)
MCV RBC AUTO: 80.5 FL — SIGNIFICANT CHANGE UP (ref 80–100)
MCV RBC AUTO: 80.6 FL — SIGNIFICANT CHANGE UP (ref 80–100)
MCV RBC AUTO: 81.2 FL — SIGNIFICANT CHANGE UP (ref 80–100)
MICROCYTES BLD QL: SIGNIFICANT CHANGE UP
MONOCYTES # BLD AUTO: 0.2 K/UL — SIGNIFICANT CHANGE UP (ref 0–0.9)
MONOCYTES # BLD AUTO: 0.5 K/UL — SIGNIFICANT CHANGE UP (ref 0–0.9)
MONOCYTES # BLD AUTO: 0.6 K/UL — SIGNIFICANT CHANGE UP (ref 0–0.9)
MONOCYTES NFR BLD AUTO: 2 % — SIGNIFICANT CHANGE UP (ref 2–14)
MONOCYTES NFR BLD AUTO: 6.2 % — SIGNIFICANT CHANGE UP (ref 2–14)
MONOCYTES NFR BLD AUTO: 6.3 % — SIGNIFICANT CHANGE UP (ref 2–14)
NEUTROPHILS # BLD AUTO: 6.85 K/UL — SIGNIFICANT CHANGE UP (ref 1.8–7.4)
NEUTROPHILS # BLD AUTO: 8.24 K/UL — HIGH (ref 1.8–7.4)
NEUTROPHILS # BLD AUTO: 9.43 K/UL — HIGH (ref 1.8–7.4)
NEUTROPHILS NFR BLD AUTO: 84.6 % — HIGH (ref 43–77)
NEUTROPHILS NFR BLD AUTO: 87.3 % — HIGH (ref 43–77)
NEUTROPHILS NFR BLD AUTO: 92.3 % — HIGH (ref 43–77)
NITRITE UR-MCNC: NEGATIVE — SIGNIFICANT CHANGE UP
NITRITE UR-MCNC: NEGATIVE — SIGNIFICANT CHANGE UP
NRBC # BLD: 0 /100 WBCS — SIGNIFICANT CHANGE UP (ref 0–0)
NT-PROBNP SERPL-SCNC: 145 PG/ML — SIGNIFICANT CHANGE UP (ref 0–300)
NT-PROBNP SERPL-SCNC: 493 PG/ML — HIGH (ref 0–300)
OB PNL STL: NEGATIVE — SIGNIFICANT CHANGE UP
OVALOCYTES BLD QL SMEAR: SLIGHT — SIGNIFICANT CHANGE UP
PCO2 BLDV: 40 MMHG — SIGNIFICANT CHANGE UP (ref 35–50)
PCO2 BLDV: 42 MMHG — SIGNIFICANT CHANGE UP (ref 35–50)
PCO2 BLDV: 50 MMHG — SIGNIFICANT CHANGE UP (ref 35–50)
PH BLDV: 7.39 — SIGNIFICANT CHANGE UP (ref 7.35–7.45)
PH BLDV: 7.45 — SIGNIFICANT CHANGE UP (ref 7.35–7.45)
PH BLDV: 7.48 — HIGH (ref 7.35–7.45)
PH UR: 6 — SIGNIFICANT CHANGE UP (ref 5–8)
PH UR: 6 — SIGNIFICANT CHANGE UP (ref 5–8)
PHOSPHATE SERPL-MCNC: 3.6 MG/DL — SIGNIFICANT CHANGE UP (ref 2.5–4.5)
PLAT MORPH BLD: NORMAL — SIGNIFICANT CHANGE UP
PLATELET # BLD AUTO: 232 K/UL — SIGNIFICANT CHANGE UP (ref 150–400)
PLATELET # BLD AUTO: 277 K/UL — SIGNIFICANT CHANGE UP (ref 150–400)
PLATELET # BLD AUTO: 487 K/UL — HIGH (ref 150–400)
PLATELET # BLD AUTO: 490 K/UL — HIGH (ref 150–400)
PLATELET # BLD AUTO: 498 K/UL — HIGH (ref 150–400)
PLATELET # BLD AUTO: 511 K/UL — HIGH (ref 150–400)
PLATELET # BLD AUTO: 516 K/UL — HIGH (ref 150–400)
PLATELET # BLD AUTO: 521 K/UL — HIGH (ref 150–400)
PLATELET # BLD AUTO: 526 K/UL — HIGH (ref 150–400)
PLATELET # BLD AUTO: 534 K/UL — HIGH (ref 150–400)
PLATELET # BLD AUTO: 536 K/UL — HIGH (ref 150–400)
PLATELET # BLD AUTO: 537 K/UL — HIGH (ref 150–400)
PLATELET # BLD AUTO: 541 K/UL — HIGH (ref 150–400)
PLATELET # BLD AUTO: 553 K/UL — HIGH (ref 150–400)
PLATELET # BLD AUTO: 562 K/UL — HIGH (ref 150–400)
PO2 BLDV: 31 MMHG — SIGNIFICANT CHANGE UP (ref 25–45)
PO2 BLDV: 34 MMHG — SIGNIFICANT CHANGE UP (ref 25–45)
PO2 BLDV: 73 MMHG — HIGH (ref 25–45)
POIKILOCYTOSIS BLD QL AUTO: SIGNIFICANT CHANGE UP
POTASSIUM BLDV-SCNC: 3.7 MMOL/L — SIGNIFICANT CHANGE UP (ref 3.5–5.3)
POTASSIUM BLDV-SCNC: 3.8 MMOL/L — SIGNIFICANT CHANGE UP (ref 3.5–5.3)
POTASSIUM BLDV-SCNC: 4.8 MMOL/L — SIGNIFICANT CHANGE UP (ref 3.5–5.3)
POTASSIUM SERPL-MCNC: 3.6 MMOL/L — SIGNIFICANT CHANGE UP (ref 3.5–5.3)
POTASSIUM SERPL-MCNC: 3.7 MMOL/L — SIGNIFICANT CHANGE UP (ref 3.5–5.3)
POTASSIUM SERPL-MCNC: 3.8 MMOL/L — SIGNIFICANT CHANGE UP (ref 3.5–5.3)
POTASSIUM SERPL-MCNC: 4.1 MMOL/L — SIGNIFICANT CHANGE UP (ref 3.5–5.3)
POTASSIUM SERPL-MCNC: 4.6 MMOL/L — SIGNIFICANT CHANGE UP (ref 3.5–5.3)
POTASSIUM SERPL-SCNC: 3.6 MMOL/L — SIGNIFICANT CHANGE UP (ref 3.5–5.3)
POTASSIUM SERPL-SCNC: 3.7 MMOL/L — SIGNIFICANT CHANGE UP (ref 3.5–5.3)
POTASSIUM SERPL-SCNC: 3.8 MMOL/L — SIGNIFICANT CHANGE UP (ref 3.5–5.3)
POTASSIUM SERPL-SCNC: 4.1 MMOL/L — SIGNIFICANT CHANGE UP (ref 3.5–5.3)
POTASSIUM SERPL-SCNC: 4.6 MMOL/L — SIGNIFICANT CHANGE UP (ref 3.5–5.3)
PROT SERPL-MCNC: 5.8 G/DL — LOW (ref 6–8.3)
PROT SERPL-MCNC: 5.9 G/DL — LOW (ref 6–8.3)
PROT SERPL-MCNC: 6.1 G/DL — SIGNIFICANT CHANGE UP (ref 6–8.3)
PROT UR-MCNC: ABNORMAL
PROT UR-MCNC: SIGNIFICANT CHANGE UP
PROTHROM AB SERPL-ACNC: 14.1 SEC — HIGH (ref 10–12.9)
PROTHROM AB SERPL-ACNC: 14.2 SEC — HIGH (ref 10–12.9)
RBC # BLD: 2.77 M/UL — LOW (ref 3.8–5.2)
RBC # BLD: 3.15 M/UL — LOW (ref 3.8–5.2)
RBC # BLD: 3.15 M/UL — LOW (ref 3.8–5.2)
RBC # BLD: 3.18 M/UL — LOW (ref 3.8–5.2)
RBC # BLD: 3.2 M/UL — LOW (ref 3.8–5.2)
RBC # BLD: 3.21 M/UL — LOW (ref 3.8–5.2)
RBC # BLD: 3.27 M/UL — LOW (ref 3.8–5.2)
RBC # BLD: 3.29 M/UL — LOW (ref 3.8–5.2)
RBC # BLD: 3.31 M/UL — LOW (ref 3.8–5.2)
RBC # BLD: 3.34 M/UL — LOW (ref 3.8–5.2)
RBC # BLD: 3.38 M/UL — LOW (ref 3.8–5.2)
RBC # BLD: 3.4 M/UL — LOW (ref 3.8–5.2)
RBC # BLD: 3.43 M/UL — LOW (ref 3.8–5.2)
RBC # BLD: 3.61 M/UL — LOW (ref 3.8–5.2)
RBC # FLD: 21.2 % — HIGH (ref 10.3–14.5)
RBC # FLD: 21.4 % — HIGH (ref 10.3–14.5)
RBC # FLD: 21.6 % — HIGH (ref 10.3–14.5)
RBC # FLD: 21.8 % — HIGH (ref 10.3–14.5)
RBC # FLD: 22.1 % — HIGH (ref 10.3–14.5)
RBC # FLD: 22.3 % — HIGH (ref 10.3–14.5)
RBC # FLD: 22.9 % — HIGH (ref 10.3–14.5)
RBC # FLD: 23 % — HIGH (ref 10.3–14.5)
RBC # FLD: 23.1 % — HIGH (ref 10.3–14.5)
RBC # FLD: 23.3 % — HIGH (ref 10.3–14.5)
RBC # FLD: 23.3 % — HIGH (ref 10.3–14.5)
RBC # FLD: 23.4 % — HIGH (ref 10.3–14.5)
RBC # FLD: 23.4 % — HIGH (ref 10.3–14.5)
RBC # FLD: 23.7 % — HIGH (ref 10.3–14.5)
RBC # FLD: 23.8 % — HIGH (ref 10.3–14.5)
RBC BLD AUTO: ABNORMAL
RETICS #: 49.6 K/UL — SIGNIFICANT CHANGE UP (ref 25–125)
RETICS/RBC NFR: 1.6 % — SIGNIFICANT CHANGE UP (ref 0.5–2.5)
RH IG SCN BLD-IMP: POSITIVE — SIGNIFICANT CHANGE UP
SAO2 % BLDV: 48 % — LOW (ref 67–88)
SAO2 % BLDV: 51 % — LOW (ref 67–88)
SAO2 % BLDV: 96 % — HIGH (ref 67–88)
SARS-COV-2 RNA SPEC QL NAA+PROBE: SIGNIFICANT CHANGE UP
SCHISTOCYTES BLD QL AUTO: SLIGHT — SIGNIFICANT CHANGE UP
SODIUM SERPL-SCNC: 139 MMOL/L — SIGNIFICANT CHANGE UP (ref 135–145)
SODIUM SERPL-SCNC: 140 MMOL/L — SIGNIFICANT CHANGE UP (ref 135–145)
SODIUM SERPL-SCNC: 145 MMOL/L — SIGNIFICANT CHANGE UP (ref 135–145)
SODIUM SERPL-SCNC: 147 MMOL/L — HIGH (ref 135–145)
SP GR SPEC: 1.02 — SIGNIFICANT CHANGE UP (ref 1.01–1.02)
SP GR SPEC: 1.02 — SIGNIFICANT CHANGE UP (ref 1.01–1.02)
SPECIMEN SOURCE: SIGNIFICANT CHANGE UP
SPECIMEN SOURCE: SIGNIFICANT CHANGE UP
TIBC SERPL-MCNC: 224 UG/DL — SIGNIFICANT CHANGE UP (ref 220–430)
TROPONIN T, HIGH SENSITIVITY RESULT: 13 NG/L — SIGNIFICANT CHANGE UP (ref 0–51)
TROPONIN T, HIGH SENSITIVITY RESULT: 13 NG/L — SIGNIFICANT CHANGE UP (ref 0–51)
UIBC SERPL-MCNC: 193 UG/DL — SIGNIFICANT CHANGE UP (ref 110–370)
UROBILINOGEN FLD QL: NEGATIVE — SIGNIFICANT CHANGE UP
UROBILINOGEN FLD QL: SIGNIFICANT CHANGE UP
VIT B12 SERPL-MCNC: 1083 PG/ML — SIGNIFICANT CHANGE UP (ref 232–1245)
WBC # BLD: 10.21 K/UL — SIGNIFICANT CHANGE UP (ref 3.8–10.5)
WBC # BLD: 10.26 K/UL — SIGNIFICANT CHANGE UP (ref 3.8–10.5)
WBC # BLD: 10.4 K/UL — SIGNIFICANT CHANGE UP (ref 3.8–10.5)
WBC # BLD: 10.62 K/UL — HIGH (ref 3.8–10.5)
WBC # BLD: 10.69 K/UL — HIGH (ref 3.8–10.5)
WBC # BLD: 10.83 K/UL — HIGH (ref 3.8–10.5)
WBC # BLD: 10.96 K/UL — HIGH (ref 3.8–10.5)
WBC # BLD: 11.37 K/UL — HIGH (ref 3.8–10.5)
WBC # BLD: 12.58 K/UL — HIGH (ref 3.8–10.5)
WBC # BLD: 13.24 K/UL — HIGH (ref 3.8–10.5)
WBC # BLD: 14.14 K/UL — HIGH (ref 3.8–10.5)
WBC # BLD: 14.85 K/UL — HIGH (ref 3.8–10.5)
WBC # BLD: 8.1 K/UL — SIGNIFICANT CHANGE UP (ref 3.8–10.5)
WBC # BLD: 8.43 K/UL — SIGNIFICANT CHANGE UP (ref 3.8–10.5)
WBC # BLD: 9.45 K/UL — SIGNIFICANT CHANGE UP (ref 3.8–10.5)
WBC # FLD AUTO: 10.21 K/UL — SIGNIFICANT CHANGE UP (ref 3.8–10.5)
WBC # FLD AUTO: 10.26 K/UL — SIGNIFICANT CHANGE UP (ref 3.8–10.5)
WBC # FLD AUTO: 10.4 K/UL — SIGNIFICANT CHANGE UP (ref 3.8–10.5)
WBC # FLD AUTO: 10.62 K/UL — HIGH (ref 3.8–10.5)
WBC # FLD AUTO: 10.69 K/UL — HIGH (ref 3.8–10.5)
WBC # FLD AUTO: 10.83 K/UL — HIGH (ref 3.8–10.5)
WBC # FLD AUTO: 10.96 K/UL — HIGH (ref 3.8–10.5)
WBC # FLD AUTO: 11.37 K/UL — HIGH (ref 3.8–10.5)
WBC # FLD AUTO: 12.58 K/UL — HIGH (ref 3.8–10.5)
WBC # FLD AUTO: 13.24 K/UL — HIGH (ref 3.8–10.5)
WBC # FLD AUTO: 14.14 K/UL — HIGH (ref 3.8–10.5)
WBC # FLD AUTO: 14.85 K/UL — HIGH (ref 3.8–10.5)
WBC # FLD AUTO: 8.1 K/UL — SIGNIFICANT CHANGE UP (ref 3.8–10.5)
WBC # FLD AUTO: 8.43 K/UL — SIGNIFICANT CHANGE UP (ref 3.8–10.5)
WBC # FLD AUTO: 9.45 K/UL — SIGNIFICANT CHANGE UP (ref 3.8–10.5)

## 2020-01-01 PROCEDURE — 80053 COMPREHEN METABOLIC PANEL: CPT

## 2020-01-01 PROCEDURE — 83540 ASSAY OF IRON: CPT

## 2020-01-01 PROCEDURE — 71275 CT ANGIOGRAPHY CHEST: CPT | Mod: 26

## 2020-01-01 PROCEDURE — 99223 1ST HOSP IP/OBS HIGH 75: CPT | Mod: GC

## 2020-01-01 PROCEDURE — 99233 SBSQ HOSP IP/OBS HIGH 50: CPT

## 2020-01-01 PROCEDURE — 99232 SBSQ HOSP IP/OBS MODERATE 35: CPT

## 2020-01-01 PROCEDURE — 82330 ASSAY OF CALCIUM: CPT

## 2020-01-01 PROCEDURE — 96365 THER/PROPH/DIAG IV INF INIT: CPT

## 2020-01-01 PROCEDURE — 71045 X-RAY EXAM CHEST 1 VIEW: CPT

## 2020-01-01 PROCEDURE — 94660 CPAP INITIATION&MGMT: CPT

## 2020-01-01 PROCEDURE — 85045 AUTOMATED RETICULOCYTE COUNT: CPT

## 2020-01-01 PROCEDURE — 83880 ASSAY OF NATRIURETIC PEPTIDE: CPT

## 2020-01-01 PROCEDURE — 70450 CT HEAD/BRAIN W/O DYE: CPT | Mod: 26

## 2020-01-01 PROCEDURE — 99291 CRITICAL CARE FIRST HOUR: CPT | Mod: 25

## 2020-01-01 PROCEDURE — 99285 EMERGENCY DEPT VISIT HI MDM: CPT | Mod: 25

## 2020-01-01 PROCEDURE — 93005 ELECTROCARDIOGRAM TRACING: CPT

## 2020-01-01 PROCEDURE — 82947 ASSAY GLUCOSE BLOOD QUANT: CPT

## 2020-01-01 PROCEDURE — 71275 CT ANGIOGRAPHY CHEST: CPT

## 2020-01-01 PROCEDURE — 85027 COMPLETE CBC AUTOMATED: CPT

## 2020-01-01 PROCEDURE — 99233 SBSQ HOSP IP/OBS HIGH 50: CPT | Mod: GC

## 2020-01-01 PROCEDURE — 82435 ASSAY OF BLOOD CHLORIDE: CPT

## 2020-01-01 PROCEDURE — 99291 CRITICAL CARE FIRST HOUR: CPT

## 2020-01-01 PROCEDURE — 71045 X-RAY EXAM CHEST 1 VIEW: CPT | Mod: 26

## 2020-01-01 PROCEDURE — 82728 ASSAY OF FERRITIN: CPT

## 2020-01-01 PROCEDURE — 84132 ASSAY OF SERUM POTASSIUM: CPT

## 2020-01-01 PROCEDURE — 87040 BLOOD CULTURE FOR BACTERIA: CPT

## 2020-01-01 PROCEDURE — 84295 ASSAY OF SERUM SODIUM: CPT

## 2020-01-01 PROCEDURE — 93010 ELECTROCARDIOGRAM REPORT: CPT

## 2020-01-01 PROCEDURE — 96367 TX/PROPH/DG ADDL SEQ IV INF: CPT

## 2020-01-01 PROCEDURE — 83735 ASSAY OF MAGNESIUM: CPT

## 2020-01-01 PROCEDURE — 82746 ASSAY OF FOLIC ACID SERUM: CPT

## 2020-01-01 PROCEDURE — 86901 BLOOD TYPING SEROLOGIC RH(D): CPT

## 2020-01-01 PROCEDURE — 86900 BLOOD TYPING SEROLOGIC ABO: CPT

## 2020-01-01 PROCEDURE — 83605 ASSAY OF LACTIC ACID: CPT

## 2020-01-01 PROCEDURE — 97535 SELF CARE MNGMENT TRAINING: CPT

## 2020-01-01 PROCEDURE — 93971 EXTREMITY STUDY: CPT | Mod: 26

## 2020-01-01 PROCEDURE — 99223 1ST HOSP IP/OBS HIGH 75: CPT

## 2020-01-01 PROCEDURE — 85730 THROMBOPLASTIN TIME PARTIAL: CPT

## 2020-01-01 PROCEDURE — 86923 COMPATIBILITY TEST ELECTRIC: CPT

## 2020-01-01 PROCEDURE — 81001 URINALYSIS AUTO W/SCOPE: CPT

## 2020-01-01 PROCEDURE — 86850 RBC ANTIBODY SCREEN: CPT

## 2020-01-01 PROCEDURE — 97110 THERAPEUTIC EXERCISES: CPT

## 2020-01-01 PROCEDURE — 85610 PROTHROMBIN TIME: CPT

## 2020-01-01 PROCEDURE — 97166 OT EVAL MOD COMPLEX 45 MIN: CPT

## 2020-01-01 PROCEDURE — 84484 ASSAY OF TROPONIN QUANT: CPT

## 2020-01-01 PROCEDURE — 99232 SBSQ HOSP IP/OBS MODERATE 35: CPT | Mod: GC

## 2020-01-01 PROCEDURE — 36430 TRANSFUSION BLD/BLD COMPNT: CPT

## 2020-01-01 PROCEDURE — 70450 CT HEAD/BRAIN W/O DYE: CPT

## 2020-01-01 PROCEDURE — 93971 EXTREMITY STUDY: CPT

## 2020-01-01 PROCEDURE — 82607 VITAMIN B-12: CPT

## 2020-01-01 PROCEDURE — 97760 ORTHOTIC MGMT&TRAING 1ST ENC: CPT

## 2020-01-01 PROCEDURE — 44360 SMALL BOWEL ENDOSCOPY: CPT | Mod: GC

## 2020-01-01 PROCEDURE — 97116 GAIT TRAINING THERAPY: CPT

## 2020-01-01 PROCEDURE — 97162 PT EVAL MOD COMPLEX 30 MIN: CPT

## 2020-01-01 PROCEDURE — 82962 GLUCOSE BLOOD TEST: CPT

## 2020-01-01 PROCEDURE — 85014 HEMATOCRIT: CPT

## 2020-01-01 PROCEDURE — 82803 BLOOD GASES ANY COMBINATION: CPT

## 2020-01-01 PROCEDURE — 82272 OCCULT BLD FECES 1-3 TESTS: CPT

## 2020-01-01 PROCEDURE — 82550 ASSAY OF CK (CPK): CPT

## 2020-01-01 PROCEDURE — P9016: CPT

## 2020-01-01 PROCEDURE — 84100 ASSAY OF PHOSPHORUS: CPT

## 2020-01-01 PROCEDURE — 45378 DIAGNOSTIC COLONOSCOPY: CPT | Mod: GC

## 2020-01-01 PROCEDURE — 83550 IRON BINDING TEST: CPT

## 2020-01-01 PROCEDURE — 83615 LACTATE (LD) (LDH) ENZYME: CPT

## 2020-01-01 PROCEDURE — 80048 BASIC METABOLIC PNL TOTAL CA: CPT

## 2020-01-01 PROCEDURE — U0003: CPT

## 2020-01-01 PROCEDURE — 99285 EMERGENCY DEPT VISIT HI MDM: CPT

## 2020-01-01 RX ORDER — PREDNISOLONE 5 MG
50 TABLET ORAL DAILY
Refills: 0 | Status: DISCONTINUED | OUTPATIENT
Start: 2020-01-01 | End: 2020-01-01

## 2020-01-01 RX ORDER — GABAPENTIN 400 MG/1
100 CAPSULE ORAL THREE TIMES A DAY
Refills: 0 | Status: DISCONTINUED | OUTPATIENT
Start: 2020-01-01 | End: 2020-01-01

## 2020-01-01 RX ORDER — MORPHINE SULFATE 50 MG/1
1.5 CAPSULE, EXTENDED RELEASE ORAL
Refills: 0 | Status: DISCONTINUED | OUTPATIENT
Start: 2020-01-01 | End: 2020-01-01

## 2020-01-01 RX ORDER — MORPHINE SULFATE 50 MG/1
1 CAPSULE, EXTENDED RELEASE ORAL EVERY 4 HOURS
Refills: 0 | Status: DISCONTINUED | OUTPATIENT
Start: 2020-01-01 | End: 2020-01-01

## 2020-01-01 RX ORDER — PANTOPRAZOLE SODIUM 20 MG/1
1 TABLET, DELAYED RELEASE ORAL
Qty: 0 | Refills: 0 | DISCHARGE
Start: 2020-01-01

## 2020-01-01 RX ORDER — HEPARIN SODIUM 5000 [USP'U]/ML
1250 INJECTION INTRAVENOUS; SUBCUTANEOUS
Qty: 25000 | Refills: 0 | Status: DISCONTINUED | OUTPATIENT
Start: 2020-01-01 | End: 2020-01-01

## 2020-01-01 RX ORDER — OXYCODONE AND ACETAMINOPHEN 5; 325 MG/1; MG/1
1 TABLET ORAL EVERY 6 HOURS
Refills: 0 | Status: DISCONTINUED | OUTPATIENT
Start: 2020-01-01 | End: 2020-01-01

## 2020-01-01 RX ORDER — PANTOPRAZOLE SODIUM 20 MG/1
40 TABLET, DELAYED RELEASE ORAL EVERY 12 HOURS
Refills: 0 | Status: DISCONTINUED | OUTPATIENT
Start: 2020-01-01 | End: 2020-01-01

## 2020-01-01 RX ORDER — HEPARIN SODIUM 5000 [USP'U]/ML
6000 INJECTION INTRAVENOUS; SUBCUTANEOUS EVERY 6 HOURS
Refills: 0 | Status: DISCONTINUED | OUTPATIENT
Start: 2020-01-01 | End: 2020-01-01

## 2020-01-01 RX ORDER — MODAFINIL 200 MG/1
100 TABLET ORAL DAILY
Refills: 0 | Status: DISCONTINUED | OUTPATIENT
Start: 2020-01-01 | End: 2020-01-01

## 2020-01-01 RX ORDER — OXYCODONE HYDROCHLORIDE 5 MG/1
5 TABLET ORAL EVERY 4 HOURS
Refills: 0 | Status: DISCONTINUED | OUTPATIENT
Start: 2020-01-01 | End: 2020-01-01

## 2020-01-01 RX ORDER — MORPHINE SULFATE 50 MG/1
0.5 CAPSULE, EXTENDED RELEASE ORAL
Qty: 100 | Refills: 0 | Status: DISCONTINUED | OUTPATIENT
Start: 2020-01-01 | End: 2020-01-01

## 2020-01-01 RX ORDER — VANCOMYCIN HCL 1 G
1000 VIAL (EA) INTRAVENOUS ONCE
Refills: 0 | Status: COMPLETED | OUTPATIENT
Start: 2020-01-01 | End: 2020-01-01

## 2020-01-01 RX ORDER — HEPARIN SODIUM 5000 [USP'U]/ML
INJECTION INTRAVENOUS; SUBCUTANEOUS
Qty: 25000 | Refills: 0 | Status: DISCONTINUED | OUTPATIENT
Start: 2020-01-01 | End: 2020-01-01

## 2020-01-01 RX ORDER — OXYCODONE HYDROCHLORIDE 5 MG/1
1 TABLET ORAL
Qty: 0 | Refills: 0 | DISCHARGE
Start: 2020-01-01

## 2020-01-01 RX ORDER — HEPARIN SODIUM 5000 [USP'U]/ML
3000 INJECTION INTRAVENOUS; SUBCUTANEOUS EVERY 6 HOURS
Refills: 0 | Status: DISCONTINUED | OUTPATIENT
Start: 2020-01-01 | End: 2020-01-01

## 2020-01-01 RX ORDER — MODAFINIL 200 MG/1
1 TABLET ORAL
Qty: 0 | Refills: 0 | DISCHARGE
Start: 2020-01-01

## 2020-01-01 RX ORDER — CEFEPIME 1 G/1
2000 INJECTION, POWDER, FOR SOLUTION INTRAMUSCULAR; INTRAVENOUS ONCE
Refills: 0 | Status: COMPLETED | OUTPATIENT
Start: 2020-01-01 | End: 2020-01-01

## 2020-01-01 RX ORDER — DIPHENHYDRAMINE HYDROCHLORIDE AND LIDOCAINE HYDROCHLORIDE AND ALUMINUM HYDROXIDE AND MAGNESIUM HYDRO
15 KIT THREE TIMES A DAY
Refills: 0 | Status: DISCONTINUED | OUTPATIENT
Start: 2020-01-01 | End: 2020-01-01

## 2020-01-01 RX ORDER — MORPHINE SULFATE 50 MG/1
2 CAPSULE, EXTENDED RELEASE ORAL
Refills: 0 | Status: DISCONTINUED | OUTPATIENT
Start: 2020-01-01 | End: 2020-01-01

## 2020-01-01 RX ORDER — SENNA PLUS 8.6 MG/1
2 TABLET ORAL
Qty: 0 | Refills: 0 | DISCHARGE
Start: 2020-01-01

## 2020-01-01 RX ORDER — ENOXAPARIN SODIUM 100 MG/ML
0.7 INJECTION SUBCUTANEOUS
Qty: 0 | Refills: 0 | DISCHARGE

## 2020-01-01 RX ORDER — SODIUM CHLORIDE 9 MG/ML
200 INJECTION INTRAMUSCULAR; INTRAVENOUS; SUBCUTANEOUS ONCE
Refills: 0 | Status: COMPLETED | OUTPATIENT
Start: 2020-01-01 | End: 2020-01-01

## 2020-01-01 RX ORDER — SENNA PLUS 8.6 MG/1
2 TABLET ORAL AT BEDTIME
Refills: 0 | Status: DISCONTINUED | OUTPATIENT
Start: 2020-01-01 | End: 2020-01-01

## 2020-01-01 RX ORDER — ACETAMINOPHEN 500 MG
650 TABLET ORAL EVERY 6 HOURS
Refills: 0 | Status: COMPLETED | OUTPATIENT
Start: 2020-01-01 | End: 2020-01-01

## 2020-01-01 RX ORDER — ACETAMINOPHEN 500 MG
650 TABLET ORAL ONCE
Refills: 0 | Status: COMPLETED | OUTPATIENT
Start: 2020-01-01 | End: 2020-01-01

## 2020-01-01 RX ORDER — DIPHENHYDRAMINE HYDROCHLORIDE AND LIDOCAINE HYDROCHLORIDE AND ALUMINUM HYDROXIDE AND MAGNESIUM HYDRO
5 KIT
Refills: 0 | Status: DISCONTINUED | OUTPATIENT
Start: 2020-01-01 | End: 2020-01-01

## 2020-01-01 RX ORDER — DIPHENHYDRAMINE HYDROCHLORIDE AND LIDOCAINE HYDROCHLORIDE AND ALUMINUM HYDROXIDE AND MAGNESIUM HYDRO
0 KIT
Qty: 0 | Refills: 0 | DISCHARGE
Start: 2020-01-01

## 2020-01-01 RX ORDER — ENOXAPARIN SODIUM 100 MG/ML
80 INJECTION SUBCUTANEOUS EVERY 12 HOURS
Refills: 0 | Status: DISCONTINUED | OUTPATIENT
Start: 2020-01-01 | End: 2020-01-01

## 2020-01-01 RX ORDER — DEXTROSE 50 % IN WATER 50 %
50 SYRINGE (ML) INTRAVENOUS ONCE
Refills: 0 | Status: DISCONTINUED | OUTPATIENT
Start: 2020-01-01 | End: 2020-01-01

## 2020-01-01 RX ORDER — ACETAMINOPHEN 500 MG
650 TABLET ORAL EVERY 4 HOURS
Refills: 0 | Status: DISCONTINUED | OUTPATIENT
Start: 2020-01-01 | End: 2020-01-01

## 2020-01-01 RX ORDER — ACETAMINOPHEN 500 MG
650 TABLET ORAL ONCE
Refills: 0 | Status: DISCONTINUED | OUTPATIENT
Start: 2020-01-01 | End: 2020-01-01

## 2020-01-01 RX ORDER — ROBINUL 0.2 MG/ML
0.4 INJECTION INTRAMUSCULAR; INTRAVENOUS EVERY 4 HOURS
Refills: 0 | Status: DISCONTINUED | OUTPATIENT
Start: 2020-01-01 | End: 2020-01-01

## 2020-01-01 RX ORDER — SOD SULF/SODIUM/NAHCO3/KCL/PEG
4000 SOLUTION, RECONSTITUTED, ORAL ORAL ONCE
Refills: 0 | Status: COMPLETED | OUTPATIENT
Start: 2020-01-01 | End: 2020-01-01

## 2020-01-01 RX ORDER — ENOXAPARIN SODIUM 100 MG/ML
80 INJECTION SUBCUTANEOUS DAILY
Refills: 0 | Status: DISCONTINUED | OUTPATIENT
Start: 2020-01-01 | End: 2020-01-01

## 2020-01-01 RX ORDER — ENOXAPARIN SODIUM 100 MG/ML
70 INJECTION SUBCUTANEOUS
Qty: 0 | Refills: 0 | DISCHARGE
Start: 2020-01-01

## 2020-01-01 RX ORDER — ACETAMINOPHEN 500 MG
975 TABLET ORAL ONCE
Refills: 0 | Status: COMPLETED | OUTPATIENT
Start: 2020-01-01 | End: 2020-01-01

## 2020-01-01 RX ORDER — HEPARIN SODIUM 5000 [USP'U]/ML
13 INJECTION INTRAVENOUS; SUBCUTANEOUS
Qty: 25000 | Refills: 0 | Status: DISCONTINUED | OUTPATIENT
Start: 2020-01-01 | End: 2020-01-01

## 2020-01-01 RX ORDER — BACITRACIN ZINC 500 UNIT/G
1 OINTMENT IN PACKET (EA) TOPICAL
Refills: 0 | Status: DISCONTINUED | OUTPATIENT
Start: 2020-01-01 | End: 2020-01-01

## 2020-01-01 RX ORDER — GABAPENTIN 400 MG/1
100 CAPSULE ORAL AT BEDTIME
Refills: 0 | Status: DISCONTINUED | OUTPATIENT
Start: 2020-01-01 | End: 2020-01-01

## 2020-01-01 RX ORDER — ONDANSETRON 8 MG/1
4 TABLET, FILM COATED ORAL EVERY 6 HOURS
Refills: 0 | Status: DISCONTINUED | OUTPATIENT
Start: 2020-01-01 | End: 2020-01-01

## 2020-01-01 RX ORDER — PANTOPRAZOLE SODIUM 20 MG/1
40 TABLET, DELAYED RELEASE ORAL
Refills: 0 | Status: DISCONTINUED | OUTPATIENT
Start: 2020-01-01 | End: 2020-01-01

## 2020-01-01 RX ORDER — PREDNISOLONE 5 MG
16.67 TABLET ORAL
Qty: 0 | Refills: 0 | DISCHARGE
Start: 2020-01-01

## 2020-01-01 RX ORDER — BACITRACIN ZINC 500 UNIT/G
1 OINTMENT IN PACKET (EA) TOPICAL
Qty: 0 | Refills: 0 | DISCHARGE

## 2020-01-01 RX ORDER — SODIUM CHLORIDE 9 MG/ML
500 INJECTION INTRAMUSCULAR; INTRAVENOUS; SUBCUTANEOUS ONCE
Refills: 0 | Status: COMPLETED | OUTPATIENT
Start: 2020-01-01 | End: 2020-01-01

## 2020-01-01 RX ORDER — PANTOPRAZOLE SODIUM 20 MG/1
40 TABLET, DELAYED RELEASE ORAL DAILY
Refills: 0 | Status: DISCONTINUED | OUTPATIENT
Start: 2020-01-01 | End: 2020-01-01

## 2020-01-01 RX ORDER — GABAPENTIN 400 MG/1
1 CAPSULE ORAL
Qty: 0 | Refills: 0 | DISCHARGE
Start: 2020-01-01

## 2020-01-01 RX ORDER — HYDROMORPHONE HYDROCHLORIDE 2 MG/ML
0.2 INJECTION INTRAMUSCULAR; INTRAVENOUS; SUBCUTANEOUS
Refills: 0 | Status: DISCONTINUED | OUTPATIENT
Start: 2020-01-01 | End: 2020-01-01

## 2020-01-01 RX ORDER — ENOXAPARIN SODIUM 100 MG/ML
70 INJECTION SUBCUTANEOUS
Refills: 0 | Status: DISCONTINUED | OUTPATIENT
Start: 2020-01-01 | End: 2020-01-01

## 2020-01-01 RX ORDER — ENOXAPARIN SODIUM 100 MG/ML
70 INJECTION SUBCUTANEOUS EVERY 12 HOURS
Refills: 0 | Status: DISCONTINUED | OUTPATIENT
Start: 2020-01-01 | End: 2020-01-01

## 2020-01-01 RX ORDER — IBUPROFEN 200 MG
600 TABLET ORAL ONCE
Refills: 0 | Status: COMPLETED | OUTPATIENT
Start: 2020-01-01 | End: 2020-01-01

## 2020-01-01 RX ORDER — ENOXAPARIN SODIUM 100 MG/ML
0 INJECTION SUBCUTANEOUS
Qty: 0 | Refills: 0 | DISCHARGE
Start: 2020-01-01

## 2020-01-01 RX ADMIN — MORPHINE SULFATE 2 MILLIGRAM(S): 50 CAPSULE, EXTENDED RELEASE ORAL at 09:48

## 2020-01-01 RX ADMIN — PANTOPRAZOLE SODIUM 40 MILLIGRAM(S): 20 TABLET, DELAYED RELEASE ORAL at 06:15

## 2020-01-01 RX ADMIN — DIPHENHYDRAMINE HYDROCHLORIDE AND LIDOCAINE HYDROCHLORIDE AND ALUMINUM HYDROXIDE AND MAGNESIUM HYDRO 5 MILLILITER(S): KIT at 18:40

## 2020-01-01 RX ADMIN — GABAPENTIN 100 MILLIGRAM(S): 400 CAPSULE ORAL at 22:03

## 2020-01-01 RX ADMIN — Medication 40 MILLIGRAM(S): at 17:22

## 2020-01-01 RX ADMIN — Medication 1 TABLET(S): at 11:56

## 2020-01-01 RX ADMIN — GABAPENTIN 100 MILLIGRAM(S): 400 CAPSULE ORAL at 22:05

## 2020-01-01 RX ADMIN — DIPHENHYDRAMINE HYDROCHLORIDE AND LIDOCAINE HYDROCHLORIDE AND ALUMINUM HYDROXIDE AND MAGNESIUM HYDRO 5 MILLILITER(S): KIT at 22:13

## 2020-01-01 RX ADMIN — GABAPENTIN 100 MILLIGRAM(S): 400 CAPSULE ORAL at 06:03

## 2020-01-01 RX ADMIN — Medication 60 MILLIGRAM(S): at 23:26

## 2020-01-01 RX ADMIN — ENOXAPARIN SODIUM 70 MILLIGRAM(S): 100 INJECTION SUBCUTANEOUS at 18:47

## 2020-01-01 RX ADMIN — PANTOPRAZOLE SODIUM 40 MILLIGRAM(S): 20 TABLET, DELAYED RELEASE ORAL at 05:38

## 2020-01-01 RX ADMIN — PANTOPRAZOLE SODIUM 40 MILLIGRAM(S): 20 TABLET, DELAYED RELEASE ORAL at 18:02

## 2020-01-01 RX ADMIN — Medication 60 MILLIGRAM(S): at 18:14

## 2020-01-01 RX ADMIN — Medication 1 APPLICATION(S): at 05:19

## 2020-01-01 RX ADMIN — HEPARIN SODIUM 13 UNIT(S)/HR: 5000 INJECTION INTRAVENOUS; SUBCUTANEOUS at 19:37

## 2020-01-01 RX ADMIN — HYDROMORPHONE HYDROCHLORIDE 0.2 MILLIGRAM(S): 2 INJECTION INTRAMUSCULAR; INTRAVENOUS; SUBCUTANEOUS at 20:26

## 2020-01-01 RX ADMIN — ENOXAPARIN SODIUM 70 MILLIGRAM(S): 100 INJECTION SUBCUTANEOUS at 18:26

## 2020-01-01 RX ADMIN — MODAFINIL 100 MILLIGRAM(S): 200 TABLET ORAL at 12:28

## 2020-01-01 RX ADMIN — Medication 60 MILLIGRAM(S): at 00:00

## 2020-01-01 RX ADMIN — PANTOPRAZOLE SODIUM 40 MILLIGRAM(S): 20 TABLET, DELAYED RELEASE ORAL at 06:08

## 2020-01-01 RX ADMIN — SODIUM CHLORIDE 500 MILLILITER(S): 9 INJECTION INTRAMUSCULAR; INTRAVENOUS; SUBCUTANEOUS at 11:52

## 2020-01-01 RX ADMIN — MORPHINE SULFATE 2 MILLIGRAM(S): 50 CAPSULE, EXTENDED RELEASE ORAL at 01:24

## 2020-01-01 RX ADMIN — SODIUM CHLORIDE 500 MILLILITER(S): 9 INJECTION INTRAMUSCULAR; INTRAVENOUS; SUBCUTANEOUS at 13:05

## 2020-01-01 RX ADMIN — PANTOPRAZOLE SODIUM 40 MILLIGRAM(S): 20 TABLET, DELAYED RELEASE ORAL at 18:40

## 2020-01-01 RX ADMIN — Medication 60 MILLIGRAM(S): at 12:15

## 2020-01-01 RX ADMIN — PANTOPRAZOLE SODIUM 40 MILLIGRAM(S): 20 TABLET, DELAYED RELEASE ORAL at 06:20

## 2020-01-01 RX ADMIN — ENOXAPARIN SODIUM 70 MILLIGRAM(S): 100 INJECTION SUBCUTANEOUS at 06:23

## 2020-01-01 RX ADMIN — MORPHINE SULFATE 1 MILLIGRAM(S): 50 CAPSULE, EXTENDED RELEASE ORAL at 06:04

## 2020-01-01 RX ADMIN — DIPHENHYDRAMINE HYDROCHLORIDE AND LIDOCAINE HYDROCHLORIDE AND ALUMINUM HYDROXIDE AND MAGNESIUM HYDRO 5 MILLILITER(S): KIT at 17:33

## 2020-01-01 RX ADMIN — GABAPENTIN 100 MILLIGRAM(S): 400 CAPSULE ORAL at 22:28

## 2020-01-01 RX ADMIN — SENNA PLUS 2 TABLET(S): 8.6 TABLET ORAL at 22:13

## 2020-01-01 RX ADMIN — DIPHENHYDRAMINE HYDROCHLORIDE AND LIDOCAINE HYDROCHLORIDE AND ALUMINUM HYDROXIDE AND MAGNESIUM HYDRO 15 MILLILITER(S): KIT at 06:03

## 2020-01-01 RX ADMIN — GABAPENTIN 100 MILLIGRAM(S): 400 CAPSULE ORAL at 22:13

## 2020-01-01 RX ADMIN — Medication 60 MILLIGRAM(S): at 12:02

## 2020-01-01 RX ADMIN — Medication 60 MILLIGRAM(S): at 18:12

## 2020-01-01 RX ADMIN — Medication 600 MILLIGRAM(S): at 16:08

## 2020-01-01 RX ADMIN — GABAPENTIN 100 MILLIGRAM(S): 400 CAPSULE ORAL at 14:13

## 2020-01-01 RX ADMIN — SODIUM CHLORIDE 80 MILLILITER(S): 9 INJECTION INTRAMUSCULAR; INTRAVENOUS; SUBCUTANEOUS at 06:00

## 2020-01-01 RX ADMIN — Medication 0.5 MILLIGRAM(S): at 11:24

## 2020-01-01 RX ADMIN — DIPHENHYDRAMINE HYDROCHLORIDE AND LIDOCAINE HYDROCHLORIDE AND ALUMINUM HYDROXIDE AND MAGNESIUM HYDRO 5 MILLILITER(S): KIT at 12:28

## 2020-01-01 RX ADMIN — DIPHENHYDRAMINE HYDROCHLORIDE AND LIDOCAINE HYDROCHLORIDE AND ALUMINUM HYDROXIDE AND MAGNESIUM HYDRO 5 MILLILITER(S): KIT at 11:57

## 2020-01-01 RX ADMIN — PANTOPRAZOLE SODIUM 40 MILLIGRAM(S): 20 TABLET, DELAYED RELEASE ORAL at 05:28

## 2020-01-01 RX ADMIN — ROBINUL 0.4 MILLIGRAM(S): 0.2 INJECTION INTRAMUSCULAR; INTRAVENOUS at 09:47

## 2020-01-01 RX ADMIN — DIPHENHYDRAMINE HYDROCHLORIDE AND LIDOCAINE HYDROCHLORIDE AND ALUMINUM HYDROXIDE AND MAGNESIUM HYDRO 5 MILLILITER(S): KIT at 11:07

## 2020-01-01 RX ADMIN — MORPHINE SULFATE 0.5 MG/HR: 50 CAPSULE, EXTENDED RELEASE ORAL at 10:33

## 2020-01-01 RX ADMIN — HEPARIN SODIUM 1500 UNIT(S)/HR: 5000 INJECTION INTRAVENOUS; SUBCUTANEOUS at 18:13

## 2020-01-01 RX ADMIN — ENOXAPARIN SODIUM 70 MILLIGRAM(S): 100 INJECTION SUBCUTANEOUS at 05:38

## 2020-01-01 RX ADMIN — HEPARIN SODIUM 13 UNIT(S)/HR: 5000 INJECTION INTRAVENOUS; SUBCUTANEOUS at 20:17

## 2020-01-01 RX ADMIN — PANTOPRAZOLE SODIUM 40 MILLIGRAM(S): 20 TABLET, DELAYED RELEASE ORAL at 22:56

## 2020-01-01 RX ADMIN — MORPHINE SULFATE 1 MILLIGRAM(S): 50 CAPSULE, EXTENDED RELEASE ORAL at 06:14

## 2020-01-01 RX ADMIN — DIPHENHYDRAMINE HYDROCHLORIDE AND LIDOCAINE HYDROCHLORIDE AND ALUMINUM HYDROXIDE AND MAGNESIUM HYDRO 5 MILLILITER(S): KIT at 06:13

## 2020-01-01 RX ADMIN — Medication 1 APPLICATION(S): at 06:23

## 2020-01-01 RX ADMIN — Medication 1 TABLET(S): at 13:58

## 2020-01-01 RX ADMIN — DIPHENHYDRAMINE HYDROCHLORIDE AND LIDOCAINE HYDROCHLORIDE AND ALUMINUM HYDROXIDE AND MAGNESIUM HYDRO 5 MILLILITER(S): KIT at 18:03

## 2020-01-01 RX ADMIN — Medication 1 APPLICATION(S): at 13:58

## 2020-01-01 RX ADMIN — Medication 4000 MILLILITER(S): at 20:00

## 2020-01-01 RX ADMIN — Medication 1 TABLET(S): at 12:31

## 2020-01-01 RX ADMIN — DIPHENHYDRAMINE HYDROCHLORIDE AND LIDOCAINE HYDROCHLORIDE AND ALUMINUM HYDROXIDE AND MAGNESIUM HYDRO 5 MILLILITER(S): KIT at 13:07

## 2020-01-01 RX ADMIN — Medication 1 TABLET(S): at 12:08

## 2020-01-01 RX ADMIN — Medication 40 MILLIGRAM(S): at 05:38

## 2020-01-01 RX ADMIN — Medication 40 MILLIGRAM(S): at 06:00

## 2020-01-01 RX ADMIN — PANTOPRAZOLE SODIUM 40 MILLIGRAM(S): 20 TABLET, DELAYED RELEASE ORAL at 17:10

## 2020-01-01 RX ADMIN — HEPARIN SODIUM 1300 UNIT(S)/HR: 5000 INJECTION INTRAVENOUS; SUBCUTANEOUS at 10:21

## 2020-01-01 RX ADMIN — ENOXAPARIN SODIUM 70 MILLIGRAM(S): 100 INJECTION SUBCUTANEOUS at 18:03

## 2020-01-01 RX ADMIN — SENNA PLUS 2 TABLET(S): 8.6 TABLET ORAL at 11:59

## 2020-01-01 RX ADMIN — Medication 250 MILLIGRAM(S): at 11:51

## 2020-01-01 RX ADMIN — MODAFINIL 100 MILLIGRAM(S): 200 TABLET ORAL at 12:17

## 2020-01-01 RX ADMIN — OXYCODONE AND ACETAMINOPHEN 1 TABLET(S): 5; 325 TABLET ORAL at 03:34

## 2020-01-01 RX ADMIN — OXYCODONE AND ACETAMINOPHEN 1 TABLET(S): 5; 325 TABLET ORAL at 12:08

## 2020-01-01 RX ADMIN — Medication 1 APPLICATION(S): at 18:26

## 2020-01-01 RX ADMIN — MORPHINE SULFATE 1 MILLIGRAM(S): 50 CAPSULE, EXTENDED RELEASE ORAL at 13:42

## 2020-01-01 RX ADMIN — ENOXAPARIN SODIUM 70 MILLIGRAM(S): 100 INJECTION SUBCUTANEOUS at 17:10

## 2020-01-01 RX ADMIN — Medication 60 MILLIGRAM(S): at 06:24

## 2020-01-01 RX ADMIN — ENOXAPARIN SODIUM 70 MILLIGRAM(S): 100 INJECTION SUBCUTANEOUS at 06:18

## 2020-01-01 RX ADMIN — Medication 60 MILLIGRAM(S): at 11:27

## 2020-01-01 RX ADMIN — CEFEPIME 2000 MILLIGRAM(S): 1 INJECTION, POWDER, FOR SOLUTION INTRAMUSCULAR; INTRAVENOUS at 13:38

## 2020-01-01 RX ADMIN — DIPHENHYDRAMINE HYDROCHLORIDE AND LIDOCAINE HYDROCHLORIDE AND ALUMINUM HYDROXIDE AND MAGNESIUM HYDRO 5 MILLILITER(S): KIT at 17:10

## 2020-01-01 RX ADMIN — Medication 1 APPLICATION(S): at 18:13

## 2020-01-01 RX ADMIN — PANTOPRAZOLE SODIUM 40 MILLIGRAM(S): 20 TABLET, DELAYED RELEASE ORAL at 18:46

## 2020-01-01 RX ADMIN — DIPHENHYDRAMINE HYDROCHLORIDE AND LIDOCAINE HYDROCHLORIDE AND ALUMINUM HYDROXIDE AND MAGNESIUM HYDRO 5 MILLILITER(S): KIT at 18:47

## 2020-01-01 RX ADMIN — Medication 0.5 MILLIGRAM(S): at 19:55

## 2020-01-01 RX ADMIN — MORPHINE SULFATE 1.5 MILLIGRAM(S): 50 CAPSULE, EXTENDED RELEASE ORAL at 06:19

## 2020-01-01 RX ADMIN — PANTOPRAZOLE SODIUM 40 MILLIGRAM(S): 20 TABLET, DELAYED RELEASE ORAL at 10:21

## 2020-01-01 RX ADMIN — ENOXAPARIN SODIUM 70 MILLIGRAM(S): 100 INJECTION SUBCUTANEOUS at 06:20

## 2020-01-01 RX ADMIN — Medication 1 TABLET(S): at 11:07

## 2020-01-01 RX ADMIN — Medication 1000 MILLIGRAM(S): at 13:05

## 2020-01-01 RX ADMIN — Medication 1 APPLICATION(S): at 06:02

## 2020-01-01 RX ADMIN — Medication 40 MILLIGRAM(S): at 05:13

## 2020-01-01 RX ADMIN — Medication 1 TABLET(S): at 12:28

## 2020-01-01 RX ADMIN — MODAFINIL 100 MILLIGRAM(S): 200 TABLET ORAL at 13:58

## 2020-01-01 RX ADMIN — Medication 40 MILLIGRAM(S): at 06:21

## 2020-01-01 RX ADMIN — MORPHINE SULFATE 1.5 MILLIGRAM(S): 50 CAPSULE, EXTENDED RELEASE ORAL at 21:50

## 2020-01-01 RX ADMIN — ENOXAPARIN SODIUM 70 MILLIGRAM(S): 100 INJECTION SUBCUTANEOUS at 06:12

## 2020-01-01 RX ADMIN — GABAPENTIN 100 MILLIGRAM(S): 400 CAPSULE ORAL at 06:08

## 2020-01-01 RX ADMIN — DIPHENHYDRAMINE HYDROCHLORIDE AND LIDOCAINE HYDROCHLORIDE AND ALUMINUM HYDROXIDE AND MAGNESIUM HYDRO 5 MILLILITER(S): KIT at 18:21

## 2020-01-01 RX ADMIN — Medication 0.5 MILLIGRAM(S): at 06:23

## 2020-01-01 RX ADMIN — DIPHENHYDRAMINE HYDROCHLORIDE AND LIDOCAINE HYDROCHLORIDE AND ALUMINUM HYDROXIDE AND MAGNESIUM HYDRO 5 MILLILITER(S): KIT at 12:31

## 2020-01-01 RX ADMIN — GABAPENTIN 100 MILLIGRAM(S): 400 CAPSULE ORAL at 05:13

## 2020-01-01 RX ADMIN — PANTOPRAZOLE SODIUM 40 MILLIGRAM(S): 20 TABLET, DELAYED RELEASE ORAL at 05:59

## 2020-01-01 RX ADMIN — Medication 650 MILLIGRAM(S): at 11:07

## 2020-01-01 RX ADMIN — ENOXAPARIN SODIUM 70 MILLIGRAM(S): 100 INJECTION SUBCUTANEOUS at 17:32

## 2020-01-01 RX ADMIN — MORPHINE SULFATE 2 MILLIGRAM(S): 50 CAPSULE, EXTENDED RELEASE ORAL at 09:44

## 2020-01-01 RX ADMIN — ENOXAPARIN SODIUM 70 MILLIGRAM(S): 100 INJECTION SUBCUTANEOUS at 05:13

## 2020-01-01 RX ADMIN — MORPHINE SULFATE 0.5 MG/HR: 50 CAPSULE, EXTENDED RELEASE ORAL at 19:55

## 2020-01-01 RX ADMIN — Medication 40 MILLIGRAM(S): at 06:15

## 2020-01-01 RX ADMIN — Medication 1 TABLET(S): at 10:21

## 2020-01-01 RX ADMIN — Medication 650 MILLIGRAM(S): at 17:22

## 2020-01-01 RX ADMIN — Medication 1 APPLICATION(S): at 18:01

## 2020-01-01 RX ADMIN — GABAPENTIN 100 MILLIGRAM(S): 400 CAPSULE ORAL at 12:29

## 2020-01-01 RX ADMIN — MORPHINE SULFATE 1.5 MILLIGRAM(S): 50 CAPSULE, EXTENDED RELEASE ORAL at 22:15

## 2020-01-01 RX ADMIN — PANTOPRAZOLE SODIUM 40 MILLIGRAM(S): 20 TABLET, DELAYED RELEASE ORAL at 11:27

## 2020-01-01 RX ADMIN — HEPARIN SODIUM 12.5 UNIT(S)/HR: 5000 INJECTION INTRAVENOUS; SUBCUTANEOUS at 02:48

## 2020-01-01 RX ADMIN — MORPHINE SULFATE 1 MILLIGRAM(S): 50 CAPSULE, EXTENDED RELEASE ORAL at 18:27

## 2020-01-01 RX ADMIN — Medication 1 TABLET(S): at 12:17

## 2020-01-01 RX ADMIN — ENOXAPARIN SODIUM 70 MILLIGRAM(S): 100 INJECTION SUBCUTANEOUS at 05:25

## 2020-01-01 RX ADMIN — PANTOPRAZOLE SODIUM 40 MILLIGRAM(S): 20 TABLET, DELAYED RELEASE ORAL at 06:18

## 2020-01-01 RX ADMIN — Medication 10 MILLIGRAM(S): at 09:48

## 2020-01-01 RX ADMIN — Medication 50 MILLIGRAM(S): at 06:13

## 2020-01-01 RX ADMIN — DIPHENHYDRAMINE HYDROCHLORIDE AND LIDOCAINE HYDROCHLORIDE AND ALUMINUM HYDROXIDE AND MAGNESIUM HYDRO 5 MILLILITER(S): KIT at 05:38

## 2020-01-01 RX ADMIN — SENNA PLUS 2 TABLET(S): 8.6 TABLET ORAL at 21:53

## 2020-01-01 RX ADMIN — MORPHINE SULFATE 1.5 MILLIGRAM(S): 50 CAPSULE, EXTENDED RELEASE ORAL at 20:30

## 2020-01-01 RX ADMIN — SENNA PLUS 2 TABLET(S): 8.6 TABLET ORAL at 22:29

## 2020-01-01 RX ADMIN — ENOXAPARIN SODIUM 70 MILLIGRAM(S): 100 INJECTION SUBCUTANEOUS at 05:28

## 2020-01-01 RX ADMIN — PANTOPRAZOLE SODIUM 40 MILLIGRAM(S): 20 TABLET, DELAYED RELEASE ORAL at 17:22

## 2020-01-01 RX ADMIN — SENNA PLUS 2 TABLET(S): 8.6 TABLET ORAL at 23:09

## 2020-01-01 RX ADMIN — DIPHENHYDRAMINE HYDROCHLORIDE AND LIDOCAINE HYDROCHLORIDE AND ALUMINUM HYDROXIDE AND MAGNESIUM HYDRO 15 MILLILITER(S): KIT at 14:13

## 2020-01-01 RX ADMIN — ENOXAPARIN SODIUM 70 MILLIGRAM(S): 100 INJECTION SUBCUTANEOUS at 06:04

## 2020-01-01 RX ADMIN — Medication 1 APPLICATION(S): at 05:21

## 2020-01-01 RX ADMIN — Medication 60 MILLIGRAM(S): at 01:19

## 2020-01-01 RX ADMIN — DIPHENHYDRAMINE HYDROCHLORIDE AND LIDOCAINE HYDROCHLORIDE AND ALUMINUM HYDROXIDE AND MAGNESIUM HYDRO 5 MILLILITER(S): KIT at 22:29

## 2020-01-01 RX ADMIN — MORPHINE SULFATE 1.5 MILLIGRAM(S): 50 CAPSULE, EXTENDED RELEASE ORAL at 03:38

## 2020-01-01 RX ADMIN — MODAFINIL 100 MILLIGRAM(S): 200 TABLET ORAL at 12:31

## 2020-01-01 RX ADMIN — MORPHINE SULFATE 1.5 MILLIGRAM(S): 50 CAPSULE, EXTENDED RELEASE ORAL at 19:00

## 2020-01-01 RX ADMIN — Medication 1 TABLET(S): at 11:12

## 2020-01-01 RX ADMIN — DIPHENHYDRAMINE HYDROCHLORIDE AND LIDOCAINE HYDROCHLORIDE AND ALUMINUM HYDROXIDE AND MAGNESIUM HYDRO 15 MILLILITER(S): KIT at 13:43

## 2020-01-01 RX ADMIN — DIPHENHYDRAMINE HYDROCHLORIDE AND LIDOCAINE HYDROCHLORIDE AND ALUMINUM HYDROXIDE AND MAGNESIUM HYDRO 5 MILLILITER(S): KIT at 11:41

## 2020-01-01 RX ADMIN — CEFEPIME 100 MILLIGRAM(S): 1 INJECTION, POWDER, FOR SOLUTION INTRAMUSCULAR; INTRAVENOUS at 13:05

## 2020-01-01 RX ADMIN — DIPHENHYDRAMINE HYDROCHLORIDE AND LIDOCAINE HYDROCHLORIDE AND ALUMINUM HYDROXIDE AND MAGNESIUM HYDRO 5 MILLILITER(S): KIT at 05:13

## 2020-01-01 RX ADMIN — Medication 60 MILLIGRAM(S): at 05:20

## 2020-01-01 RX ADMIN — MORPHINE SULFATE 1.5 MILLIGRAM(S): 50 CAPSULE, EXTENDED RELEASE ORAL at 02:30

## 2020-01-01 RX ADMIN — ENOXAPARIN SODIUM 70 MILLIGRAM(S): 100 INJECTION SUBCUTANEOUS at 18:20

## 2020-01-01 RX ADMIN — GABAPENTIN 100 MILLIGRAM(S): 400 CAPSULE ORAL at 21:53

## 2020-01-01 RX ADMIN — SENNA PLUS 2 TABLET(S): 8.6 TABLET ORAL at 21:48

## 2020-01-01 RX ADMIN — Medication 1 TABLET(S): at 13:07

## 2020-01-01 RX ADMIN — DIPHENHYDRAMINE HYDROCHLORIDE AND LIDOCAINE HYDROCHLORIDE AND ALUMINUM HYDROXIDE AND MAGNESIUM HYDRO 5 MILLILITER(S): KIT at 01:23

## 2020-01-01 RX ADMIN — MORPHINE SULFATE 1 MILLIGRAM(S): 50 CAPSULE, EXTENDED RELEASE ORAL at 22:20

## 2020-01-01 RX ADMIN — DIPHENHYDRAMINE HYDROCHLORIDE AND LIDOCAINE HYDROCHLORIDE AND ALUMINUM HYDROXIDE AND MAGNESIUM HYDRO 5 MILLILITER(S): KIT at 06:05

## 2020-01-01 RX ADMIN — MORPHINE SULFATE 1 MILLIGRAM(S): 50 CAPSULE, EXTENDED RELEASE ORAL at 01:18

## 2020-01-01 RX ADMIN — GABAPENTIN 100 MILLIGRAM(S): 400 CAPSULE ORAL at 23:09

## 2020-01-01 RX ADMIN — PANTOPRAZOLE SODIUM 40 MILLIGRAM(S): 20 TABLET, DELAYED RELEASE ORAL at 17:32

## 2020-01-01 RX ADMIN — Medication 40 MILLIGRAM(S): at 06:18

## 2020-01-01 RX ADMIN — Medication 60 MILLIGRAM(S): at 06:02

## 2020-01-01 RX ADMIN — GABAPENTIN 100 MILLIGRAM(S): 400 CAPSULE ORAL at 13:47

## 2020-01-01 RX ADMIN — PANTOPRAZOLE SODIUM 40 MILLIGRAM(S): 20 TABLET, DELAYED RELEASE ORAL at 12:14

## 2020-01-01 RX ADMIN — DIPHENHYDRAMINE HYDROCHLORIDE AND LIDOCAINE HYDROCHLORIDE AND ALUMINUM HYDROXIDE AND MAGNESIUM HYDRO 15 MILLILITER(S): KIT at 21:56

## 2020-01-01 RX ADMIN — MORPHINE SULFATE 1.5 MILLIGRAM(S): 50 CAPSULE, EXTENDED RELEASE ORAL at 15:22

## 2020-01-01 RX ADMIN — Medication 60 MILLIGRAM(S): at 18:27

## 2020-01-01 RX ADMIN — DIPHENHYDRAMINE HYDROCHLORIDE AND LIDOCAINE HYDROCHLORIDE AND ALUMINUM HYDROXIDE AND MAGNESIUM HYDRO 5 MILLILITER(S): KIT at 23:08

## 2020-01-01 RX ADMIN — GABAPENTIN 100 MILLIGRAM(S): 400 CAPSULE ORAL at 21:48

## 2020-01-01 RX ADMIN — MORPHINE SULFATE 1 MILLIGRAM(S): 50 CAPSULE, EXTENDED RELEASE ORAL at 18:11

## 2020-01-01 RX ADMIN — PANTOPRAZOLE SODIUM 40 MILLIGRAM(S): 20 TABLET, DELAYED RELEASE ORAL at 18:21

## 2020-01-01 RX ADMIN — GABAPENTIN 100 MILLIGRAM(S): 400 CAPSULE ORAL at 14:24

## 2020-01-01 RX ADMIN — PANTOPRAZOLE SODIUM 40 MILLIGRAM(S): 20 TABLET, DELAYED RELEASE ORAL at 05:13

## 2020-01-01 RX ADMIN — Medication 975 MILLIGRAM(S): at 11:51

## 2020-01-01 RX ADMIN — Medication 50 MILLIGRAM(S): at 06:02

## 2020-01-01 RX ADMIN — DIPHENHYDRAMINE HYDROCHLORIDE AND LIDOCAINE HYDROCHLORIDE AND ALUMINUM HYDROXIDE AND MAGNESIUM HYDRO 5 MILLILITER(S): KIT at 06:19

## 2020-01-01 RX ADMIN — DIPHENHYDRAMINE HYDROCHLORIDE AND LIDOCAINE HYDROCHLORIDE AND ALUMINUM HYDROXIDE AND MAGNESIUM HYDRO 5 MILLILITER(S): KIT at 17:36

## 2020-01-01 RX ADMIN — Medication 0.5 MILLIGRAM(S): at 21:37

## 2020-01-01 RX ADMIN — MORPHINE SULFATE 2 MILLIGRAM(S): 50 CAPSULE, EXTENDED RELEASE ORAL at 14:49

## 2020-01-01 RX ADMIN — HEPARIN SODIUM 13 UNIT(S)/HR: 5000 INJECTION INTRAVENOUS; SUBCUTANEOUS at 02:42

## 2020-01-01 RX ADMIN — HEPARIN SODIUM 3000 UNIT(S): 5000 INJECTION INTRAVENOUS; SUBCUTANEOUS at 18:14

## 2020-01-01 RX ADMIN — Medication 40 MILLIGRAM(S): at 05:28

## 2020-01-01 RX ADMIN — Medication 1 APPLICATION(S): at 06:18

## 2020-01-01 RX ADMIN — Medication 650 MILLIGRAM(S): at 00:39

## 2020-01-01 RX ADMIN — ENOXAPARIN SODIUM 70 MILLIGRAM(S): 100 INJECTION SUBCUTANEOUS at 17:35

## 2020-01-01 RX ADMIN — MORPHINE SULFATE 1 MILLIGRAM(S): 50 CAPSULE, EXTENDED RELEASE ORAL at 21:52

## 2020-01-01 RX ADMIN — MORPHINE SULFATE 1.5 MILLIGRAM(S): 50 CAPSULE, EXTENDED RELEASE ORAL at 07:21

## 2020-01-01 RX ADMIN — MORPHINE SULFATE 1 MILLIGRAM(S): 50 CAPSULE, EXTENDED RELEASE ORAL at 10:28

## 2020-01-01 RX ADMIN — OXYCODONE HYDROCHLORIDE 5 MILLIGRAM(S): 5 TABLET ORAL at 11:46

## 2020-01-01 RX ADMIN — MORPHINE SULFATE 1 MILLIGRAM(S): 50 CAPSULE, EXTENDED RELEASE ORAL at 05:20

## 2020-01-01 RX ADMIN — DIPHENHYDRAMINE HYDROCHLORIDE AND LIDOCAINE HYDROCHLORIDE AND ALUMINUM HYDROXIDE AND MAGNESIUM HYDRO 5 MILLILITER(S): KIT at 12:17

## 2020-01-01 RX ADMIN — MORPHINE SULFATE 1.5 MILLIGRAM(S): 50 CAPSULE, EXTENDED RELEASE ORAL at 16:32

## 2020-01-01 RX ADMIN — ENOXAPARIN SODIUM 70 MILLIGRAM(S): 100 INJECTION SUBCUTANEOUS at 19:54

## 2020-01-01 RX ADMIN — MORPHINE SULFATE 0.5 MG/HR: 50 CAPSULE, EXTENDED RELEASE ORAL at 08:01

## 2020-01-01 RX ADMIN — MORPHINE SULFATE 1 MILLIGRAM(S): 50 CAPSULE, EXTENDED RELEASE ORAL at 11:12

## 2020-01-01 RX ADMIN — DIPHENHYDRAMINE HYDROCHLORIDE AND LIDOCAINE HYDROCHLORIDE AND ALUMINUM HYDROXIDE AND MAGNESIUM HYDRO 5 MILLILITER(S): KIT at 06:21

## 2020-01-01 RX ADMIN — ENOXAPARIN SODIUM 70 MILLIGRAM(S): 100 INJECTION SUBCUTANEOUS at 18:40

## 2020-05-12 NOTE — H&P ADULT - PROBLEM/PLAN-4
DISPLAY PLAN FREE TEXT Cyclosporine Counseling:  I discussed with the patient the risks of cyclosporine including but not limited to hypertension, gingival hyperplasia,myelosuppression, immunosuppression, liver damage, kidney damage, neurotoxicity, lymphoma, and serious infections. The patient understands that monitoring is required including baseline blood pressure, CBC, CMP, lipid panel and uric acid, and then 1-2 times monthly CMP and blood pressure.

## 2020-06-03 NOTE — ED PROVIDER NOTE - CARE PLAN
Principal Discharge DX:	Shortness of breath  Secondary Diagnosis:	Pulmonary edema  Secondary Diagnosis:	Anemia

## 2020-06-03 NOTE — H&P ADULT - NSICDXPASTMEDICALHX_GEN_ALL_CORE_FT
PAST MEDICAL HISTORY:  Anemia     Breast cancer     H/O hemorrhoids     Lymphedema rt arm    Mitral valve prolapse

## 2020-06-03 NOTE — H&P ADULT - NSHPREVIEWOFSYSTEMS_GEN_ALL_CORE
CONSTITUTIONAL:+  weakness, no fevers or chills  EYES/ENT: No visual changes;  No dysphagia  NECK: No pain or stiffness  RESPIRATORY: No cough, wheezing, hemoptysis; + shortness of breath  CARDIOVASCULAR: No chest pain or palpitations; No lower extremity edema  EXTREMITIES: no le edema, cyanosis, clubbing  GASTROINTESTINAL: No abdominal or epigastric pain. + nausea,  no vomiting, or hematemesis; No diarrhea or constipation. +  melena  no hematochezia.  BACK: + mid thoracic  back pain  GENITOURINARY: No dysuria, frequency or hematuria  NEUROLOGICAL: No numbness or weakness  MSK: no joint swelling or pain  SKIN: No itching, burning, rashes, or lesions   PSYCH: no agitation  All other review of systems is negative unless indicated above.

## 2020-06-03 NOTE — CHART NOTE - NSCHARTNOTEFT_GEN_A_CORE
Notified by US tech with verbal report, RUE Doppler  with acute Brachial DVTs.  Pt endorses R arm pain and swelling for 1 month now admitted for SOB, c/b anemia.   Pt is a 69y F w/ PMHx R breast cancer s/p radiation last 3 months ago, states now in remission),  anemia presenting to ED w/ sob and R arm pain x1 month. Patient endorsing worsening sob w/ exertion, occurring daily. Patient also complaining of worsening RUE pain and swelling x4 weeks. States she received xrays 4 weeks ago for RUE pain, all negative. Also experiencing R hand numbness and tingling x1 week. Denies fever, chills, cp, cough, n/v/d, dysuria.   Pt s/p CTA of chest w/ no PE but with right pleural effusion and mild pulmonary edema    a/p: Notified Night Hospitalist Dr. ASHLIE Escoto presently admitting patient of verbal report for new Brachial DVT. However course complicated by anemia, pt s/p 1 unit PRBC , fecal occult negative. Pt pending post transfusion PRBC at this time. Dr. Escoto aware pt w/out PE on CTA of chest.  Will notify floor coverage to follow up pending orders and H& P.    Delia Walton, MARCIE  x [phone]

## 2020-06-03 NOTE — H&P ADULT - NSHPLABSRESULTS_GEN_ALL_CORE
Labs personally reviewed:                          6.4    8.10  )-----------( 537      ( 03 Jun 2020 15:11 )             21.0     06-03    139  |  101  |  13  ----------------------------<  116<H>  4.1   |  25  |  0.58    Ca    9.0      03 Jun 2020 15:11    TPro  6.1  /  Alb  2.9<L>  /  TBili  0.3  /  DBili  x   /  AST  62<H>  /  ALT  21  /  AlkPhos  130<H>  06-03        LIVER FUNCTIONS - ( 03 Jun 2020 15:11 )  Alb: 2.9 g/dL / Pro: 6.1 g/dL / ALK PHOS: 130 U/L / ALT: 21 U/L / AST: 62 U/L / GGT: x           PT/INR - ( 03 Jun 2020 15:11 )   PT: 14.1 sec;   INR: 1.23 ratio         PTT - ( 03 Jun 2020 15:11 )  PTT:29.1 sec    CAPILLARY BLOOD GLUCOSE          Imaging:  CTA chest: No pulmonary embolism.  Nodular right breast skin thickening with underlying inflammation extending to the chest wall musculature consistent with recurrent invasive breast cancer. Widespread metastases above and below the diaphragm with lymphadenopathy, as described above.  Interlobular septal thickening more prominent on the right, may reflect mild pulmonary edema however lymphangitic carcinomatosis can be considered.    Small right pleural effusion.    CXR: Moderate sized probable loculated right pleural effusion with associated atelectasis. A right lower lobe pneumonia cannot be exclude      EKG personally reviewed: sinus tachycardia at 103 bpm

## 2020-06-03 NOTE — ED PROVIDER NOTE - OBJECTIVE STATEMENT
Attn - pt seen in [facility] - pt with current right breast cancer, treated with RT 3 months and has not seen anyone since then with c/o FUNK, SOB, and right arm pain and swelling x 1 week.  NO: fever, chills, cp, abdo pain, palp.  Pt denies any other PMHx. 69y F w/ PMHx R breast cancer, anemia presenting to ED w/ sob and R arm pain x1 month. Patient states she was getting treatment w/ radiation, last treatment 3 months ago (states she is in remission and does not require further treatment). States her surgical oncologist is Dr. Beni Caballero. Patient endorsing worsening sob w/ exertion, occurring daily. Patient also complaining of worsening RUE pain and swelling x4 weeks. States she received xrays 4 weeks ago for RUE pain, all negative. Also experiencing R hand numbness and tingling x1 week. Denies fever, chills, cp, cough, n/v/d, dysuria.     Attn - pt seen in [facility] - pt with current right breast cancer, treated with RT 3 months and has not seen anyone since then with c/o FUNK, SOB, and right arm pain and swelling x 1 week.  NO: fever, chills, cp, abdo pain, palp.  Pt denies any other PMHx.

## 2020-06-03 NOTE — H&P ADULT - PROBLEM SELECTOR PLAN 1
No PE on CTA ,  multiple pulm nodules and concern for lymphangitic involvement , likely contributing to symptoms as well as anemia   - Treat underlying anemia   - obtain BNP   - Breast cancer work up as outlined below

## 2020-06-03 NOTE — ED ADULT NURSE NOTE - ED STAT RN HANDOFF DETAILS
Received handoff from Carlos DELANEY. Patient has 1 unit of PRBCs currently infusing. Denies SOB, fever/chills, pruritus, rash, chest pain, N/V currently. Plan of care discussed. Safety and comfort measures maintained.

## 2020-06-03 NOTE — H&P ADULT - PROBLEM SELECTOR PLAN 2
patient apprehensive to perform full work up, needs time to adjust , can re-discuss further imaging for complete staging during day time  after discussion with oncology   - Oncology consult - to be arranged by day team

## 2020-06-03 NOTE — H&P ADULT - ASSESSMENT
69F w/ R  breast cancer (diagnosed in 2018) s/p chemotherapy and radiation,   anemia, MVP, p/w SOB and RUE swelling

## 2020-06-03 NOTE — H&P ADULT - HISTORY OF PRESENT ILLNESS
Patient is a 69 year old female with PMH right breast cancer (diagnosed in 2018) s/p chemotherapy and radiation treatment ( last session 3 months prior to admission) currently in remission, anemia, MVP, presents for shortness of breath and right arm pain for the past month.  Patient reports progressive dyspnea on exertion over the past few weeks , she also notes pain and swelling in her right upper extremity during this time, and for the past week she reports numbness and tingling. She has no fever or chills. She reports no chest pain. No cough. Patient is a 69 year old female with PMH right breast cancer (diagnosed in 2018) s/p chemotherapy and radiation treatment ( last session about 6 months prior to admission),  anemia, MVP, presents for shortness of breath and right arm pain for the past month.  Patient reports progressive dyspnea on exertion over the past few weeks , and now experiences SOB with minimal exertion. She also notes pain and swelling in her right upper extremity during this time, and for the past week she reports occasional numbness and tingling in her fingers. She reports generalized weakness and decreased appetite. She reports diffuse pain most prominent in her back for which she took Aleve with minimal response.  She has no fever or chills. She reports no chest pain. No cough. She has no lightheadedness.  She reports no blood in stool, but did have dark soft stool , which she attributed to iron supplements.

## 2020-06-03 NOTE — H&P ADULT - PROBLEM SELECTOR PLAN 3
hgb 6.7 , reported dark stools s/p 1 U pRBCs transfusion   - check post transfusion CBC   -  GI consult - emailed - day team to f/u recommendations   - Clear liquid diet - in anticipation for possible GI intervention   - IV PPI   - monitor H/H

## 2020-06-03 NOTE — ED PROVIDER NOTE - CLINICAL SUMMARY MEDICAL DECISION MAKING FREE TEXT BOX
Attn - pt with Right breast CA with last treatment ~3 months ago (RT only) with FUNK, SOB, and swelling and pain Right UExt - DDX - PE +/- R upper Ext DVT v pleural effusion v metastatic extension to thorax/lung.   CT Pulm Angio, Right UExt - US to r/o DVT, labs, CXR, EKG, labs.  likely admit.

## 2020-06-03 NOTE — ED ADULT NURSE REASSESSMENT NOTE - NS ED NURSE REASSESS COMMENT FT1
PRBC infusing, vitals stable, adm pending, per ED resident, okay to infuse over 3 hrs although order states 1 hour, pt without c/o, afebrile, pt to US once covid result available

## 2020-06-03 NOTE — ED PROVIDER NOTE - PHYSICAL EXAMINATION
attn - alert, nad, tachycardic, no pallor, lungs - dull right base with decreased BS.  Cor - rr, no M, Chest - obvious cancer Right breast with ulceration, skin changes and fixed to chest wall.  Abdo - soft, ND, NT, no CVAT.  Extrem - edema, swelling of right upper extrem. +pulses.  LExt - NL.   neuro - intact and NF. Physical Exam:  Gen: NAD, non-toxic appearing, awake alert   Head: NCAT  HEENT: PEERL, normal conjunctiva, oral mucosa dry  Lung: Decreased breath sounds w/ rails at R base w/ dullness to percussion, no respiratory distress, no wheezes/rhonchi B/L, speaking in full sentences  CV: RRR, no murmurs, rubs or gallops, pulses palpated in B/L UE/LE  Abd: soft, NT, ND, no guarding, no rigidity   MSK: +swelling of RUE to axilla w/ 2+pitting edema over dorsum of R hand, patient has limited ROM of RUE 2/2 pain/weakness  Neuro: No focal sensory or motor deficits  Skin: fungating R firm breast mass expanding to R axilla, non-tender to palpation  Psych: normal affect, calm  ~Celia Crow D.O. -Resident    attn - alert, nad, tachycardic, no pallor, lungs - dull right base with decreased BS.  Cor - rr, no M, Chest - obvious cancer Right breast with ulceration, skin changes and fixed to chest wall.  Abdo - soft, ND, NT, no CVAT.  Extrem - edema, swelling of right upper extrem. +pulses.  LExt - NL.   neuro - intact and NF.

## 2020-06-03 NOTE — H&P ADULT - NSHPSOCIALHISTORY_GEN_ALL_CORE
Social History:    Lives with: (  ) alone  (  ) children   (  ) spouse   (  ) parents  (x ) Brother    Substance Use (street drugs): (x  ) never used  (  ) other:  Tobacco Usage:  (  x ) never smoked, second hand smoke exposure    (   ) former smoker   (   ) current smoker  (     ) pack year  (        ) last cigarette date  Alcohol Usage: no etoh use

## 2020-06-03 NOTE — H&P ADULT - NSHPPHYSICALEXAM_GEN_ALL_CORE
Vital Signs Last 24 Hrs  T(C): 36.8 (03 Jun 2020 19:00), Max: 37.5 (03 Jun 2020 14:00)  T(F): 98.3 (03 Jun 2020 19:00), Max: 99.5 (03 Jun 2020 14:00)  HR: 99 (03 Jun 2020 19:00) (99 - 110)  BP: 113/61 (03 Jun 2020 19:00) (111/66 - 137/65)  BP(mean): --  RR: 24 (03 Jun 2020 19:00) (20 - 24)  SpO2: 95% (03 Jun 2020 19:00) (95% - 98%) Vital Signs Last 24 Hrs  T(C): 36.8 (03 Jun 2020 19:00), Max: 37.5 (03 Jun 2020 14:00)  T(F): 98.3 (03 Jun 2020 19:00), Max: 99.5 (03 Jun 2020 14:00)  HR: 99 (03 Jun 2020 19:00) (99 - 110)  BP: 113/61 (03 Jun 2020 19:00) (111/66 - 137/65)  BP(mean): --  RR: 24 (03 Jun 2020 19:00) (20 - 24)  SpO2: 95% (03 Jun 2020 19:00) (95% - 98%)    GENERAL:  tired appearing , alert and oriented , well-developed  HEAD:  Atraumatic, Normocephalic  ENT: EOMI, PERRLA, conjunctiva and sclera clear,  moist mucosa no pharyngeal erythema or exudates   NECK: supple , no JVD   CHEST/LUNG: Fungating mass on R breast , lung clear to auscultation bilaterally; No wheeze, equal breath sounds bilaterally   BACK: No spinal tenderness,  No CVA tenderness   HEART: Regular rate and rhythm; No murmurs, rubs, or gallops  ABDOMEN: Soft, Nontender, Nondistended; Bowel sounds present  EXTREMITIES: RU extremity with diffuse edema  compartments are soft , distal sensation intact ,  No clubbing, cyanosis, NO LE edema  MSK: No joint swelling or effusions, ROM intact   PSYCH: Normal behavior/affect  NEUROLOGY: AAOx3, non-focal, cranial nerves intact  SKIN: Large fungating mass on right breast  with peau de' orange changes ,  breast is firm and nodular , with area of necrosis , no discharge appreciated

## 2020-06-03 NOTE — ED PROVIDER NOTE - SEVERE SEPSIS ALERT DETAILS
I have seen and evaluated this patient and do not believe the patient is septic at this time.  I will continue to evaluate. -Celia Crow PGY-1

## 2020-06-03 NOTE — ED ADULT NURSE NOTE - OBJECTIVE STATEMENT
Pt is a 70 yo F who was brought to the Ed from home via EMS c/o FUNK. Pt stated she has FUNK x 4 weeks, worse today where she feels "winded" when doing activities. Also c/o pain in rt shoulder and rt arm x 2 weeks and swelling in the arm x 1 week. States she had an Xrays done with negative results, was told to see a chiropractor. Hx rt beast ca with radiation. Rt breast hard, swollen, warm extending into axilla. Rt arm swollen with dec ROM, dec pulses due to swelling. Last radiation was in March of this year. Pt wearing a sling she borrowed from a friend. A/O x3, denies chest pain/palpitations, no dizziness/lightheadedness, no fever/chills, no abd pain n/v/d. Pt is a 70 yo F who was brought to the Ed from home via EMS c/o FUNK. Pt stated she has FUNK x 4 weeks, worse today where she feels "winded" when doing activities. Also c/o pain in rt shoulder and rt arm x 2 weeks and swelling in the arm x 1 week. States she had an Xrays done with negative results, was told to see a chiropractor. Hx rt beast ca with radiation. Rt breast hard, swollen, warm extending into axilla. Rt arm swollen with dec ROM, dec pulses due to swelling. Last radiation was in March of this year. Pt wearing a sling she borrowed from a friend. A/O x3, denies chest pain/palpitations, no dizziness/lightheadedness, no fever/chills, no abd pain n/v/, had a  few "loose stools".

## 2020-06-03 NOTE — H&P ADULT - PROBLEM SELECTOR PLAN 4
brachial DVT. unable to initiate anticoagulation due to concern for possible GI bleeding , No PE on CTA  and compartments are soft therefore less of urgency to start now , would ensure no active bleeding first   - When CBC stable on consecutive specimens 6 hours apart , can start anticoagulation , after cleared by GI  - monitor RUE  for compartment syndrome or arterial compromise

## 2020-06-03 NOTE — ED ADULT NURSE REASSESSMENT NOTE - NS ED NURSE REASSESS COMMENT FT1
resting in bed, talking on phone, tachy to 107 on monitor, consented for blood transfusion, no other c/o, adm pending, RUE edema noted

## 2020-06-04 NOTE — CONSULT NOTE ADULT - ASSESSMENT
Patient is a 69 year old female with Metastatic Breast CA s/p chemo and RT, anemia, MVP, presenting with shortness of breath and right arm pain for the past month.  PE was ruled out; however, there is concern for progression of disease and lymphangitic spread. She was also found to have DVT of her RUE and Anemia.

## 2020-06-04 NOTE — PROGRESS NOTE ADULT - PROBLEM SELECTOR PLAN 2
-diagnosed in 2018, s/p chemo and radiation treatment which was done 6 months prior   -onc consult placed  -palliative pending onc recs

## 2020-06-04 NOTE — CONSULT NOTE ADULT - ATTENDING COMMENTS
pt seen and examined d/w the team    pts  right pleural effusion is small no need to do thoracentesis  --the ct suggest lymhangitis carciniomatosis--consider steroids

## 2020-06-04 NOTE — PROGRESS NOTE ADULT - PROBLEM SELECTOR PLAN 3
-hgb 6.7 , reported dark stools s/p 1 U pRBCs transfusion, improved to 7.8   -continue with PPI 40mg daily  -planned for EGD and colonoscopy tomorrow   -pulm clearance in chart

## 2020-06-04 NOTE — CONSULT NOTE ADULT - ASSESSMENT
Patient is a 68 yo F w/ R breast cancer (dx 2018, s/p chemo and embolization during 9/2019 [facility] hospitalization but did not follow up), iron def anemia, MVP admitted on 6/3 for SOB and RUE pain x 1 month.  As per chart and patient, reports progressive FUNK over past few weeks. Also endorses RUE extremity pain and swelling, with occasional numbness and tingling in her fingers. Endorses generalized weakness and decreased appetite, diffuse pain most prominent in her back. Denies fevers, chills, chest pain, cough, dizziness.     Admission labs notable for Hb 6.4, Alk P 130, AST 62, Lact 2.1. 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 RUE duplex noted R brachial DVT.     Patient recieved 1 un PRBC overnight and was started on a heparin gtt. Pulmonary consulted for evaluation of lung findings and for pre EGD evaluation.

## 2020-06-04 NOTE — CONSULT NOTE ADULT - SUBJECTIVE AND OBJECTIVE BOX
Chief Complaint:  RUE swelling     HPI:   Patient is a 69 year old female with PMH right breast cancer (diagnosed in 2018) s/p chemotherapy, Regional chemo perfusion/embolization 2019 and radiation treatment ( last session about 6 months prior to admission),  anemia, MVP, presents for shortness of breath and right arm pain for the past month.    Patient reports progressive dyspnea on exertion over the past few weeks , and now experiences SOB with minimal exertion. She also notes pain and swelling in her right upper extremity during this time, and for the past week she reports occasional numbness and tingling in her fingers. She reports generalized weakness and decreased appetite.   She reports diffuse pain most prominent in her back for which she took Aleve with minimal response.  She has no fever or chills.   She reports no chest pain. No cough. She has no lightheadedness.  S  he reports no blood in stool, but did have dark soft stool , which she attributed to iron supplements. (2020 19:56)        Allergies:  No Known Allergies    Home Medications:  Multiple Vitamins oral capsule: 1 cap(s) orally once a day (2020 20:58)  Vitamin B Complex 100: tab(s) orally (2020 20:58)      Hospital Medications:  acetaminophen   Tablet .. 650 milliGRAM(s) Oral every 4 hours PRN  multivitamin 1 Tablet(s) Oral daily  ondansetron Injectable 4 milliGRAM(s) IV Push every 6 hours PRN  oxyCODONE    IR 5 milliGRAM(s) Oral every 4 hours PRN  pantoprazole  Injectable 40 milliGRAM(s) IV Push every 12 hours      PMHX/PSHX:  Lymphedema  H/O hemorrhoids  Mitral valve prolapse  Anemia  Breast cancer  H/O inguinal hernia repair  H/O umbilical hernia repair  H/O hemorrhoidectomy      Family history:  FH: hyperlipidemia  FH: hypertension      Social History: no smoking    ROS:   General:  No fevers, chills or night sweats.  ENT:  No sore throat or dysphagia  CV:  No pain or palpitations  Resp:  No dyspnea, cough, wheezing  GI:  as above  Skin:  No rash or edema      PHYSICAL EXAM:   GENERAL:  NAD, Appears stated age  HEENT:  NC/AT,  conjunctivae clear and pink, sclera -anicteric  CHEST:  CTA B/L, Normal effort  HEART:  RRR S1/S2, No murmurs  ABDOMEN:  Soft, non-tender, non-distended, normoactive bowel sounds,  no masses   EXTREMITIES:  RU extremity with diffuse edema   SKIN:  Large fungating mass on right breast  with peau de' orange changes ,  breast is firm and nodular , with area of necrosis , no discharge appreciated  NEURO:  Alert, oriented    Vital Signs:  Vital Signs Last 24 Hrs  T(C): 37.2 (2020 06:15), Max: 37.5 (2020 14:00)  T(F): 99 (2020 06:15), Max: 99.5 (2020 14:00)  HR: 102 (2020 06:15) (78 - 110)  BP: 109/69 (2020 06:15) (109/69 - 137/65)  BP(mean): --  RR: 20 (2020 06:15) (20 - 24)  SpO2: 95% (2020 06:15) (93% - 98%)  Daily Height in cm: 167.64 (2020 20:28)    Daily Weight in k.2 (2020 20:28)    LABS:                        7.8    10.40 )-----------( 521      ( 2020 05:58 )             25.0     Mean Cell Volume: 77.9 fl (- @ 05:58)    06-04    139  |  102  |  9   ----------------------------<  104<H>  3.7   |  24  |  0.56    Ca    9.0      2020 05:03    TPro  5.8<L>  /  Alb  2.8<L>  /  TBili  0.5  /  DBili  x   /  AST  57<H>  /  ALT  18  /  AlkPhos  115  06-04    LIVER FUNCTIONS - ( 2020 05:03 )  Alb: 2.8 g/dL / Pro: 5.8 g/dL / ALK PHOS: 115 U/L / ALT: 18 U/L / AST: 57 U/L / GGT: x           PT/INR - ( 2020 15:11 )   PT: 14.1 sec;   INR: 1.23 ratio         PTT - ( 2020 15:11 )  PTT:29.1 sec                            7.8    10.40 )-----------( 521      ( 2020 05:58 )             25.0                         7.7    10.62 )-----------( 498      ( 2020 05:03 )             24.6                         7.8    8.43  )-----------( 487      ( 2020 22:50 )             25.0                         6.4    8.10  )-----------( 537      ( 2020 15:11 )             21.0     Imaging:  < from: CT Angio Chest w/ IV Cont (20 @ 16:24) >  EXAM:  CT ANGIO CHEST (W)AW IC                            PROCEDURE DATE:  2020            INTERPRETATION:  CLINICAL INFORMATION: Right upper extremity edema. Tachycardia and shortness of breath.    COMPARISON: CT chest 2019.    PROCEDURE:   CT Angiography of the Chest.  90 ml of Omnipaque 350 was injected intravenously. 10 ml were discarded.  Sagittal and coronal reformats were performed as well as 3D (MIP) reconstructions.    FINDINGS:    LUNGS AND AIRWAYS: Patent central airways.New bilateral pulmonary nodules likely reflecting metastatic disease, with a reference 1.1 cm nodule in the left lower lobe (2:99). Interlobular septal thickening more prominent on the right, may reflect mild pulmonary edema however lymphangitic carcinomatosis can be considered. Right lower lobe passive atelectasis.  PLEURA: Small right pleural effusion.  MEDIASTINUM AND NATHANIEL: Extensive mediastinal and bilateral hilar lymphadenopathy. A prevascular node measures 2.6 x 1.8 cm.  VESSELS: No pulmonary embolism. Narrowing of the right main pulmonary artery secondary to external compression from mediastinal adenopathy.  HEART: Heart size is normal. No pericardial effusion.    CHEST WALL AND LOWER NECK: Nodular right breast and chest wall skin thickening with a nodular masslike area of soft tissue in the retroareolar region. Extensive inflammatory change in the right breast extending to the chest wall musculature with loss of the normal fat planes. There is a hyperattenuating nodular mass in the infrascapular region measuring up to 9.0 x 4.7 cm, with numerous satellite nodules. Bilateral axillary and supracervical lymphadenopathy. Left breast skin thickening.      VISUALIZED UPPER ABDOMEN: Numerous ill-defined hepatic lesions measuring up to 5.0 x 3.6 cm (2:111), likely reflecting metastases. In enlarged retroperitoneal lymph nodes, with a reference node measuring 2.6 x 2.1 cm. Nodular bilateral adrenal thickening.    BONES: Lytic lesions in the T2, L1 and L2 vertebral bodies    IMPRESSION:     No pulmonary embolism.    Nodular right breast skin thickening with underlying inflammation extending to the chest wall musculature consistent with recurrent invasive breast cancer. Widespread metastases above and below the diaphragm with lymphadenopathy, as described above.    Interlobular septal thickening more prominent on the right, may reflect mild pulmonary edema however lymphangitic carcinomatosis can be considered.    Small right pleural effusion.      < end of copied text > Chief Complaint:  RUE swelling     HPI:   Patient is a 69 year old female with PMH right breast cancer (diagnosed in 2018) s/p regional chemo perfusion/embolization 2019 and radiation treatment ( last session about 6 months prior to admission),  anemia, MVP, presents for shortness of breath and right arm pain for the past month.    Patient reports progressive dyspnea on exertion over the past few weeks , and now experiences SOB with minimal exertion. She also notes pain and swelling in her right upper extremity during this time, and for the past week she reports occasional numbness and tingling in her fingers. She reports generalized weakness and decreased appetite.   She reports diffuse pain most prominent in her back for which she took Aleve with minimal response.  She has no fever or chills.   She reports no chest pain. No cough. She has no lightheadedness.  S  he reports no blood in stool, but did have dark soft stool , which she attributed to iron supplements. (2020 19:56)        Allergies:  No Known Allergies    Home Medications:  Multiple Vitamins oral capsule: 1 cap(s) orally once a day (2020 20:58)  Vitamin B Complex 100: tab(s) orally (2020 20:58)      Hospital Medications:  acetaminophen   Tablet .. 650 milliGRAM(s) Oral every 4 hours PRN  multivitamin 1 Tablet(s) Oral daily  ondansetron Injectable 4 milliGRAM(s) IV Push every 6 hours PRN  oxyCODONE    IR 5 milliGRAM(s) Oral every 4 hours PRN  pantoprazole  Injectable 40 milliGRAM(s) IV Push every 12 hours      PMHX/PSHX:  Lymphedema  H/O hemorrhoids  Mitral valve prolapse  Anemia  Breast cancer  H/O inguinal hernia repair  H/O umbilical hernia repair  H/O hemorrhoidectomy      Family history:  FH: hyperlipidemia  FH: hypertension      Social History: no smoking    ROS:   General:  No fevers, chills or night sweats.  ENT:  No sore throat or dysphagia  CV:  No pain or palpitations  Resp:  No dyspnea, cough, wheezing  GI:  as above  Skin:  No rash or edema      PHYSICAL EXAM:   GENERAL:  NAD, Appears stated age  HEENT:  NC/AT,  conjunctivae clear and pink, sclera -anicteric  CHEST:  CTA B/L, Normal effort  HEART:  RRR S1/S2, No murmurs  ABDOMEN:  Soft, non-tender, non-distended, normoactive bowel sounds,  no masses   EXTREMITIES:  RU extremity with diffuse edema   SKIN:  Large fungating mass on right breast  with peau de' orange changes ,  breast is firm and nodular , with area of necrosis , no discharge appreciated  NEURO:  Alert, oriented    Vital Signs:  Vital Signs Last 24 Hrs  T(C): 37.2 (2020 06:15), Max: 37.5 (2020 14:00)  T(F): 99 (2020 06:15), Max: 99.5 (2020 14:00)  HR: 102 (2020 06:15) (78 - 110)  BP: 109/69 (2020 06:15) (109/69 - 137/65)  BP(mean): --  RR: 20 (2020 06:15) (20 - 24)  SpO2: 95% (2020 06:15) (93% - 98%)  Daily Height in cm: 167.64 (2020 20:28)    Daily Weight in k.2 (2020 20:28)    LABS:                        7.8    10.40 )-----------( 521      ( 2020 05:58 )             25.0     Mean Cell Volume: 77.9 fl (20 @ 05:58)    06-04    139  |  102  |  9   ----------------------------<  104<H>  3.7   |  24  |  0.56    Ca    9.0      2020 05:03    TPro  5.8<L>  /  Alb  2.8<L>  /  TBili  0.5  /  DBili  x   /  AST  57<H>  /  ALT  18  /  AlkPhos  115  06-04    LIVER FUNCTIONS - ( 2020 05:03 )  Alb: 2.8 g/dL / Pro: 5.8 g/dL / ALK PHOS: 115 U/L / ALT: 18 U/L / AST: 57 U/L / GGT: x           PT/INR - ( 2020 15:11 )   PT: 14.1 sec;   INR: 1.23 ratio         PTT - ( 2020 15:11 )  PTT:29.1 sec                            7.8    10.40 )-----------( 521      ( 2020 05:58 )             25.0                         7.7    10.62 )-----------( 498      ( 2020 05:03 )             24.6                         7.8    8.43  )-----------( 487      ( 2020 22:50 )             25.0                         6.4    8.10  )-----------( 537      ( 2020 15:11 )             21.0     Imaging:  < from: CT Angio Chest w/ IV Cont (20 @ 16:24) >  EXAM:  CT ANGIO CHEST (W)AW IC                            PROCEDURE DATE:  2020            INTERPRETATION:  CLINICAL INFORMATION: Right upper extremity edema. Tachycardia and shortness of breath.    COMPARISON: CT chest 2019.    PROCEDURE:   CT Angiography of the Chest.  90 ml of Omnipaque 350 was injected intravenously. 10 ml were discarded.  Sagittal and coronal reformats were performed as well as 3D (MIP) reconstructions.    FINDINGS:    LUNGS AND AIRWAYS: Patent central airways.New bilateral pulmonary nodules likely reflecting metastatic disease, with a reference 1.1 cm nodule in the left lower lobe (2:99). Interlobular septal thickening more prominent on the right, may reflect mild pulmonary edema however lymphangitic carcinomatosis can be considered. Right lower lobe passive atelectasis.  PLEURA: Small right pleural effusion.  MEDIASTINUM AND NATHANIEL: Extensive mediastinal and bilateral hilar lymphadenopathy. A prevascular node measures 2.6 x 1.8 cm.  VESSELS: No pulmonary embolism. Narrowing of the right main pulmonary artery secondary to external compression from mediastinal adenopathy.  HEART: Heart size is normal. No pericardial effusion.    CHEST WALL AND LOWER NECK: Nodular right breast and chest wall skin thickening with a nodular masslike area of soft tissue in the retroareolar region. Extensive inflammatory change in the right breast extending to the chest wall musculature with loss of the normal fat planes. There is a hyperattenuating nodular mass in the infrascapular region measuring up to 9.0 x 4.7 cm, with numerous satellite nodules. Bilateral axillary and supracervical lymphadenopathy. Left breast skin thickening.      VISUALIZED UPPER ABDOMEN: Numerous ill-defined hepatic lesions measuring up to 5.0 x 3.6 cm (2:111), likely reflecting metastases. In enlarged retroperitoneal lymph nodes, with a reference node measuring 2.6 x 2.1 cm. Nodular bilateral adrenal thickening.    BONES: Lytic lesions in the T2, L1 and L2 vertebral bodies    IMPRESSION:     No pulmonary embolism.    Nodular right breast skin thickening with underlying inflammation extending to the chest wall musculature consistent with recurrent invasive breast cancer. Widespread metastases above and below the diaphragm with lymphadenopathy, as described above.    Interlobular septal thickening more prominent on the right, may reflect mild pulmonary edema however lymphangitic carcinomatosis can be considered.    Small right pleural effusion.      < end of copied text > Chief Complaint:  RUE swelling     HPI:   Patient is a 69 year old female with PMH right breast cancer (diagnosed in 2018) s/p regional chemo perfusion/embolization 2019 and radiation treatment ( last session about 6 months prior to admission),  anemia, MVP, presents for shortness of breath and right arm pain for the past month.    Patient reports progressive dyspnea on exertion over the past few weeks , and now experiences SOB with minimal exertion. She also notes pain and swelling in her right upper extremity during this time, and for the past week she reports occasional numbness and tingling in her fingers. She reports generalized weakness and decreased appetite.   She reports diffuse pain most prominent in her back for which she took Aleve with minimal response.  She has no fever or chills.   She reports no chest pain. No cough. She has no lightheadedness.  S  he reports no blood in stool, but did have dark soft stool , which she attributed to iron supplements. (2020 19:56). She denies nausea/vomiting/abdominal pain.  She has not had any recent EGD or colonoscopy.        Allergies:  No Known Allergies    Home Medications:  Multiple Vitamins oral capsule: 1 cap(s) orally once a day (2020 20:58)  Vitamin B Complex 100: tab(s) orally (2020 20:58)      Hospital Medications:  acetaminophen   Tablet .. 650 milliGRAM(s) Oral every 4 hours PRN  multivitamin 1 Tablet(s) Oral daily  ondansetron Injectable 4 milliGRAM(s) IV Push every 6 hours PRN  oxyCODONE    IR 5 milliGRAM(s) Oral every 4 hours PRN  pantoprazole  Injectable 40 milliGRAM(s) IV Push every 12 hours      PMHX/PSHX:  Lymphedema  H/O hemorrhoids  Mitral valve prolapse  Anemia  Breast cancer  H/O inguinal hernia repair  H/O umbilical hernia repair  H/O hemorrhoidectomy      Family history:  FH: hyperlipidemia  FH: hypertension      Social History: no smoking or illicit drugs    ROS:   General:  No fevers, chills or night sweats.  ENT:  No sore throat or dysphagia  CV:  No pain or palpitations  Resp:  No dyspnea, cough, wheezing  GI:  as above  Skin:  No rash or edema  Uro: no dysuria  Skin: no rashes  EXT: UE edema      PHYSICAL EXAM:   GENERAL:  NAD, Appears stated age  HEENT:  NC/AT,  conjunctivae clear and pink, sclera -anicteric  CHEST:  CTA B/L, Normal effort  HEART:  RRR S1/S2, No murmurs  ABDOMEN:  Soft, non-tender, non-distended, normoactive bowel sounds,  no masses   EXTREMITIES:  RU extremity with diffuse edema   SKIN:  Large fungating mass on right breast  with peau de' orange changes ,  breast is firm and nodular , with area of necrosis , no discharge appreciated  NEURO:  Alert, oriented x 3    Vital Signs:  Vital Signs Last 24 Hrs  T(C): 37.2 (2020 06:15), Max: 37.5 (2020 14:00)  T(F): 99 (2020 06:15), Max: 99.5 (2020 14:00)  HR: 102 (2020 06:15) (78 - 110)  BP: 109/69 (2020 06:15) (109/69 - 137/65)  BP(mean): --  RR: 20 (2020 06:15) (20 - 24)  SpO2: 95% (2020 06:15) (93% - 98%)  Daily Height in cm: 167.64 (2020 20:28)    Daily Weight in k.2 (2020 20:28)    LABS:                        7.8    10.40 )-----------( 521      ( 2020 05:58 )             25.0     Mean Cell Volume: 77.9 fl (20 @ 05:58)    06-04    139  |  102  |  9   ----------------------------<  104<H>  3.7   |  24  |  0.56    Ca    9.0      2020 05:03    TPro  5.8<L>  /  Alb  2.8<L>  /  TBili  0.5  /  DBili  x   /  AST  57<H>  /  ALT  18  /  AlkPhos  115  06-04    LIVER FUNCTIONS - ( 2020 05:03 )  Alb: 2.8 g/dL / Pro: 5.8 g/dL / ALK PHOS: 115 U/L / ALT: 18 U/L / AST: 57 U/L / GGT: x           PT/INR - ( 2020 15:11 )   PT: 14.1 sec;   INR: 1.23 ratio         PTT - ( 2020 15:11 )  PTT:29.1 sec                            7.8    10.40 )-----------( 521      ( 2020 05:58 )             25.0                         7.7    10.62 )-----------( 498      ( 2020 05:03 )             24.6                         7.8    8.43  )-----------( 487      ( 2020 22:50 )             25.0                         6.4    8.10  )-----------( 537      ( 2020 15:11 )             21.0     Imaging:  < from: CT Angio Chest w/ IV Cont (20 @ 16:24) >  EXAM:  CT ANGIO CHEST (W)AW IC                            PROCEDURE DATE:  2020            INTERPRETATION:  CLINICAL INFORMATION: Right upper extremity edema. Tachycardia and shortness of breath.    COMPARISON: CT chest 2019.    PROCEDURE:   CT Angiography of the Chest.  90 ml of Omnipaque 350 was injected intravenously. 10 ml were discarded.  Sagittal and coronal reformats were performed as well as 3D (MIP) reconstructions.    FINDINGS:    LUNGS AND AIRWAYS: Patent central airways.New bilateral pulmonary nodules likely reflecting metastatic disease, with a reference 1.1 cm nodule in the left lower lobe (2:99). Interlobular septal thickening more prominent on the right, may reflect mild pulmonary edema however lymphangitic carcinomatosis can be considered. Right lower lobe passive atelectasis.  PLEURA: Small right pleural effusion.  MEDIASTINUM AND NATHANIEL: Extensive mediastinal and bilateral hilar lymphadenopathy. A prevascular node measures 2.6 x 1.8 cm.  VESSELS: No pulmonary embolism. Narrowing of the right main pulmonary artery secondary to external compression from mediastinal adenopathy.  HEART: Heart size is normal. No pericardial effusion.    CHEST WALL AND LOWER NECK: Nodular right breast and chest wall skin thickening with a nodular masslike area of soft tissue in the retroareolar region. Extensive inflammatory change in the right breast extending to the chest wall musculature with loss of the normal fat planes. There is a hyperattenuating nodular mass in the infrascapular region measuring up to 9.0 x 4.7 cm, with numerous satellite nodules. Bilateral axillary and supracervical lymphadenopathy. Left breast skin thickening.      VISUALIZED UPPER ABDOMEN: Numerous ill-defined hepatic lesions measuring up to 5.0 x 3.6 cm (2:111), likely reflecting metastases. In enlarged retroperitoneal lymph nodes, with a reference node measuring 2.6 x 2.1 cm. Nodular bilateral adrenal thickening.    BONES: Lytic lesions in the T2, L1 and L2 vertebral bodies    IMPRESSION:     No pulmonary embolism.    Nodular right breast skin thickening with underlying inflammation extending to the chest wall musculature consistent with recurrent invasive breast cancer. Widespread metastases above and below the diaphragm with lymphadenopathy, as described above.    Interlobular septal thickening more prominent on the right, may reflect mild pulmonary edema however lymphangitic carcinomatosis can be considered.    Small right pleural effusion.      < end of copied text >

## 2020-06-04 NOTE — CONSULT NOTE ADULT - PROBLEM SELECTOR RECOMMENDATION 5
Will continue to f/u for GOC, ACP, and symptoms.   D/W pulmonary fellow and primary NP.         Cornelio Garcia MD   Geriatrics and Palliative Care ([facility]) Consult Service    of Geriatric and Palliative Medicine  [facility]      Please page the following number for clinical matters between the hours of 9 am and 5 pm from Monday through Friday : [phone]    After 5pm and on weekends, please see the contact information below:    In the event of newly developing, evolving, or worsening symptoms, please contact the Palliative Medicine team via pager (if the patient is at [facility] #[phone] or if the patient is at [facility] #[phone]) The Geriatric and Palliative Medicine service has coverage 24 hours a day/ 7 days a week to provide medical recommendations regarding symptom management needs via telephone

## 2020-06-04 NOTE — CONSULT NOTE ADULT - PROBLEM SELECTOR RECOMMENDATION 9
likely 2/2 to CA with ? Lymphangitic spread.   On Solumedrol   Consider ABG  D/W Pulmonary Fellow that will try to re-evaluate.   D/W covering NP   Consider O2 and BIPAP if needed for comfort.   At this time the patient is full code.

## 2020-06-04 NOTE — PROGRESS NOTE ADULT - PROBLEM SELECTOR PLAN 1
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 Also has small R pleural effusion  -etiology likely multifactorial   -appreciate pulm recs, will start on solumedrol 40mg IV daily for lymphagitic spread

## 2020-06-04 NOTE — PROGRESS NOTE ADULT - SUBJECTIVE AND OBJECTIVE BOX
Patient is a 69y old  Female who presents with a chief complaint of SOB and RUE swelling x one month (04 Jun 2020 10:14)    SUBJECTIVE / OVERNIGHT EVENTS: patient reports pain and discomfort on her R breast area as well as low back pain which has been chronic.     MEDICATIONS  (STANDING):  heparin  Infusion.  Unit(s)/Hr (13 mL/Hr) IV Continuous <Continuous>  multivitamin 1 Tablet(s) Oral daily  pantoprazole  Injectable 40 milliGRAM(s) IV Push every 12 hours    MEDICATIONS  (PRN):  acetaminophen   Tablet .. 650 milliGRAM(s) Oral every 4 hours PRN Mild Pain (1 - 3)  heparin   Injectable 6000 Unit(s) IV Push every 6 hours PRN For aPTT less than 40  heparin   Injectable 3000 Unit(s) IV Push every 6 hours PRN For aPTT between 40 - 57  ondansetron Injectable 4 milliGRAM(s) IV Push every 6 hours PRN Nausea  oxyCODONE    IR 5 milliGRAM(s) Oral every 4 hours PRN Moderate Pain (4 - 6)      Vital Signs Last 24 Hrs  T(C): 36.8 (04 Jun 2020 08:31), Max: 37.5 (03 Jun 2020 14:00)  T(F): 98.3 (04 Jun 2020 08:31), Max: 99.5 (03 Jun 2020 14:00)  HR: 108 (04 Jun 2020 08:31) (78 - 110)  BP: 108/61 (04 Jun 2020 08:31) (108/61 - 137/65)  BP(mean): --  RR: 18 (04 Jun 2020 08:31) (18 - 24)  SpO2: 92% (04 Jun 2020 08:31) (92% - 98%)  CAPILLARY BLOOD GLUCOSE        I&O's Summary    03 Jun 2020 07:01  -  04 Jun 2020 07:00  --------------------------------------------------------  IN: 590 mL / OUT: 0 mL / NET: 590 mL    04 Jun 2020 07:01  -  04 Jun 2020 11:35  --------------------------------------------------------  IN: 180 mL / OUT: 0 mL / NET: 180 mL    PHYSICAL EXAM:  GENERAL: NAD  EYES:  conjunctiva and sclera clear  NECK: Supple, No JVD  CHEST/LUNG: Clear to auscultation bilaterally; No wheeze  HEART: +S1/S2, reg   ABDOMEN: Soft, Nontender, Nondistended  EXTREMITIES: +RUE edema noted. No LE edema   PSYCH: AAOx3  NEUROLOGY: Strength 4/5 in the LE b/l     LABS:                        7.8    10.40 )-----------( 521      ( 04 Jun 2020 05:58 )             25.0     06-04    139  |  102  |  9   ----------------------------<  104<H>  3.7   |  24  |  0.56    Ca    9.0      04 Jun 2020 05:03    TPro  5.8<L>  /  Alb  2.8<L>  /  TBili  0.5  /  DBili  x   /  AST  57<H>  /  ALT  18  /  AlkPhos  115  06-04    PT/INR - ( 03 Jun 2020 15:11 )   PT: 14.1 sec;   INR: 1.23 ratio         PTT - ( 03 Jun 2020 15:11 )  PTT:29.1 sec

## 2020-06-04 NOTE — CONSULT NOTE ADULT - PROBLEM SELECTOR RECOMMENDATION 2
Unclear etiology. However, it may be 2/2 to meds (opioids. Patient received Oxy 5 mg at 11:46. Though by this time 16:37 the sedation effect of this medication should have decreased) metabolic abnormalities due to her underlying respiratory issues (? Hypercapnia)  Since the patient is opioids naive, I will advise DC Oxy 5 mg and Starting Morphine 1 mg q 4 PRN for now.   Narcan PRN

## 2020-06-04 NOTE — PROGRESS NOTE ADULT - PROBLEM SELECTOR PLAN 4
-will start on heparin gtt for anticoagulation given procedure tomorrow   -can transition to lovenox/NOAC after procedures are done   -monitor for compartment syndrome

## 2020-06-04 NOTE — CONSULT NOTE ADULT - ASSESSMENT
69 year old female with PMH right breast cancer (diagnosed in 2018) s/p chemotherapy, Regional chemo perfusion/embolization 09/2019 and radiation treatment ( last session about 6 months prior to admission),  anemia, MVP, presents for shortness of breath and right arm pain for the past month.    She was found to have right brachial vein deep venous thrombosis.    GI consult was called for clearance to start anticoagulation given anemia with reported black stool.  FOBT was negative     Hgb: 6.4 on admission s/p 1 unit of PRBC with appropriate response to 7.8 and stable   (baseline has been between 7-8 in 09/2019)  Anemia studies has been mixed: low normal ferritin, normal TIBC but low transferrin saturation 8%     impression:  # Microcytic Anemia without overt GI bleed likely multifactorial Iron deficiency anemia, chemotherapy induced / bone marrow suppression, anemia of chronic disease   # Right brachial vein deep venous thrombosis.  # Right breast cancer s/p chemotherapy, Regional chemo perfusion/embolization 09/2019 and radiation treatment    Recommendations: 69 year old female with PMH right breast cancer (diagnosed in 2018) s/p regional chemo perfusion/embolization 09/2019 and radiation treatment ( last session about 6 months prior to admission),  anemia, MVP, presents for shortness of breath and right arm pain for the past month.    She was found to have right brachial vein deep venous thrombosis.    GI consult was called for clearance to start anticoagulation given anemia with reported black stool.  FOBT was negative     Hgb: 6.4 on admission s/p 1 unit of PRBC with appropriate response to 7.8 and stable   (baseline has been between 7-8 in 09/2019)  Anemia studies has been mixed: low normal ferritin, normal TIBC but low transferrin saturation 8%     Patient is known to have to CRISTIAN and has been taking iron supplements.    impression:  # Microcytic Anemia without overt GI bleed likely multifactorial Iron deficiency anemia, chemotherapy induced / bone marrow suppression, anemia of chronic disease   # Right brachial vein deep venous thrombosis.  # Right breast cancer s/p chemotherapy, Regional chemo perfusion/embolization 09/2019 and radiation treatment  # Small right pleural effusion.    Recommendations:  - No GI contraindication to start IV heparin as there is no evidence of active GI bleed.  - PPI 40 mg daily for now  - We can tentatively plan for endoscopic evaluation with EGD and colonoscopy tomorrow if she is medically optimized   -  Please obtain pulmonary consult for possible thoracenr 69 year old female with PMH right breast cancer (diagnosed in 2018) s/p regional chemo perfusion/embolization 09/2019 and radiation treatment ( last session about 6 months prior to admission),  anemia, MVP, presents for shortness of breath and right arm pain for the past month.    She was found to have right brachial vein deep venous thrombosis.    GI consult was called for clearance to start anticoagulation given anemia with reported black stool.  FOBT was negative     Hgb: 6.4 on admission s/p 1 unit of PRBC with appropriate response to 7.8 and stable   (baseline has been between 7-8 in 09/2019)  Anemia studies has been mixed: low normal ferritin, normal TIBC but low transferrin saturation 8%     Patient is known to have to CRISTIAN and has been taking iron supplements.    impression:  # Microcytic Anemia without overt GI bleed likely multifactorial Iron deficiency anemia, chemotherapy induced / bone marrow suppression, anemia of chronic disease   # Dyspnea on exertion with small right pleural effusion.  # Right brachial vein deep venous thrombosis.  # Right breast cancer s/p regional chemo perfusion/embolization 09/2019 and radiation treatment    Recommendations:  - No GI contraindication to start IV heparin as there is no evidence of active GI bleed.  - PPI 40 mg daily for now  - We can tentatively plan for endoscopic evaluation with EGD and colonoscopy tomorrow if she is medically optimized   -  Please obtain pulmonary consult for possible thoracentesis and pre op risk stratification 69 year old female with PMH right breast cancer (diagnosed in 2018) s/p regional chemo perfusion/embolization 09/2019 and radiation treatment ( last session about 6 months prior to admission),  anemia, MVP, presents for shortness of breath and right arm pain for the past month.    She was found to have right brachial vein deep venous thrombosis.    GI consult was called for clearance to start anticoagulation given anemia with reported black stool.  FOBT was negative     Hgb: 6.4 on admission s/p 1 unit of PRBC with appropriate response to 7.8 and stable   (baseline has been between 7-8 in 09/2019)  Anemia studies has been mixed: low normal ferritin, normal TIBC but low transferrin saturation 8%     Patient is known to have to CRISTIAN and has been taking iron supplements.    impression:  # Microcytic Anemia without overt GI bleed likely multifactorial Iron deficiency anemia, chemotherapy induced / bone marrow suppression, anemia of chronic disease   # Dyspnea on exertion with small right pleural effusion.  # Right brachial vein deep venous thrombosis.  # Right breast cancer s/p regional chemo perfusion/embolization 09/2019 and radiation treatment    Recommendations:  - No GI contraindication to start IV heparin as there is no evidence of active GI bleed.  - PPI 40 mg daily for now  - We can tentatively plan for endoscopic evaluation with EGD and colonoscopy tomorrow if she is medically optimized   -  Please obtain pulmonary consult for possible thoracentesis and pre op risk stratification given pleural effusion  - Given metastatic disease, f/u oncology recommendations

## 2020-06-04 NOTE — CONSULT NOTE ADULT - PROBLEM SELECTOR RECOMMENDATION 9
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 R pleural effusion likely malignant, small. No PE on CTA. Currently breathing comfortably and saturating well on RA.  - Strongly recommend palliative care consult, patient is amenable  - While R pleural effusion is present, small size unlikely significantly contributory to SOB given other pulmonary findings  - Trial of Solumedrol 40mg IV daily for lymphangitic spread  - ARISCAT Score 22 points: Low risk 1.6% risk of in-hospital post-op pulmonary complications (composite including respiratory failure, respiratory infection, pleural effusion, atelectasis, pneumothorax, bronchospasm, aspiration pneumonitis) for low risk procedure. Patient is medically optimized from pulmonary standpoint for EGD.    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 R pleural effusion likely malignant, small. No PE on CTA. Currently breathing comfortably and saturating well on RA.  - Strongly recommend palliative care consult, patient is amenable  - While R pleural effusion is present, small size unlikely significantly contributory to SOB given other pulmonary findings  - Trial of Solumedrol 40mg IV daily for lymphangitic spread  - ARISCAT Score 22 points: Low risk 1.6% risk of in-hospital post-op pulmonary complications (composite including respiratory failure, respiratory infection, pleural effusion, atelectasis, pneumothorax, bronchospasm, aspiration pneumonitis) for low risk procedure. Patient is medically optimized from pulmonary standpoint for EGD.    Will sign off. Please call back if any questions.    Magen Monge MD  Pulmonary & Critical Care Fellow  [phone]

## 2020-06-04 NOTE — CONSULT NOTE ADULT - PROBLEM SELECTOR RECOMMENDATION 3
Morphine as above. Morphine as above.  Will also add Tylenol 650 mg PO q 6 ATC x 2 days then can continue as PRN.   If her mental status improves, may  try to introduced Gabapentin.

## 2020-06-04 NOTE — CHART NOTE - NSCHARTNOTEFT_GEN_A_CORE
EGD and colonoscopy scheduled tomorrow at 8 am   Please stop heparin at 2: 00 am  Discussed with NP at 3: 00 pm

## 2020-06-04 NOTE — CONSULT NOTE ADULT - ATTENDING COMMENTS
Patient seen and examined. Agree with above. She presents with acute UE DVT, and has microcytic anemia. No overt bleeding. Previous workup revealed iron deficiency from 2019. Would repeat iron studies. She has known metastatic breat cancer - discussed with her endoscopic workup for evaluation for possible GI source of bleeding, and she is agreeable. Pulmonary consult appreciated, no need to tap pleural effusion. Will plan for EGD/colonoscopy tomorrow. Would follow-up oncology recommendations and GOC discussions.

## 2020-06-04 NOTE — CONSULT NOTE ADULT - SUBJECTIVE AND OBJECTIVE BOX
CHIEF COMPLAINT:    HPI: Patient is a 70 yo F w/ R breast cancer (dx 2018, s/p chemo and RT (last 6 months PTA), iron def anemia, MVP admitted on 6/3 for SOB and RUE pain x 1 month.  As per chart and patient, reports progressive FUNK over past few weeks. Also endorses RUE extremity pain and swelling, with occasional numbness and tingling in her fingers. Endorses generalized weakness and decreased appetite, diffuse pain most prominent in her back. Denies fevers, chills, chest pain, cough, dizziness.     Admission labs notable for Hb 6.4, Alk P 130, AST 62, Lact 2.1. 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 RUE duplex noted R brachial DVT.         PAST MEDICAL & SURGICAL HISTORY:  Lymphedema: rt arm  H/O hemorrhoids  Mitral valve prolapse  Anemia  Breast cancer  H/O inguinal hernia repair  H/O umbilical hernia repair  H/O hemorrhoidectomy      FAMILY HISTORY:  FH: hyperlipidemia: brother  FH: hypertension: mother, brother      SOCIAL HISTORY:  Smoking: [ ] Never Smoked [ ] Former Smoker (__ packs x ___ years) [ ] Current Smoker  (__ packs x ___ years)  Substance Use: [ ] Never Used [ ] Used ____  EtOH Use:  Marital Status: [ ] Single [ ]  [ ]  [ ]   Sexual History:   Occupation:  Recent Travel:  Country of Birth:  Advance Directives:    Allergies    No Known Allergies    Intolerances        HOME MEDICATIONS:  Home Medications:  Multiple Vitamins oral capsule: 1 cap(s) orally once a day (03 Jun 2020 20:58)  Vitamin B Complex 100: tab(s) orally (03 Jun 2020 20:58)      REVIEW OF SYSTEMS:  Constitutional: [ ] negative [ ] fevers [ ] chills [ ] weight loss [ ] weight gain  HEENT: [ ] negative [ ] dry eyes [ ] eye irritation [ ] postnasal drip [ ] nasal congestion  CV: [ ] negative  [ ] chest pain [ ] orthopnea [ ] palpitations [ ] murmur  Resp: [ ] negative [ ] cough [ ] shortness of breath [ ] dyspnea [ ] wheezing [ ] sputum [ ] hemoptysis  GI: [ ] negative [ ] nausea [ ] vomiting [ ] diarrhea [ ] constipation [ ] abd pain [ ] dysphagia   : [ ] negative [ ] dysuria [ ] nocturia [ ] hematuria [ ] increased urinary frequency  Musculoskeletal: [ ] negative [ ] back pain [ ] myalgias [ ] arthralgias [ ] fracture  Skin: [ ] negative [ ] rash [ ] itch  Neurological: [ ] negative [ ] headache [ ] dizziness [ ] syncope [ ] weakness [ ] numbness  Psychiatric: [ ] negative [ ] anxiety [ ] depression  Endocrine: [ ] negative [ ] diabetes [ ] thyroid problem  Hematologic/Lymphatic: [ ] negative [ ] anemia [ ] bleeding problem  Allergic/Immunologic: [ ] negative [ ] itchy eyes [ ] nasal discharge [ ] hives [ ] angioedema  [ ] All other systems negative  [ ] Unable to assess ROS because ________    OBJECTIVE:  ICU Vital Signs Last 24 Hrs  T(C): 36.8 (04 Jun 2020 08:31), Max: 37.5 (03 Jun 2020 14:00)  T(F): 98.3 (04 Jun 2020 08:31), Max: 99.5 (03 Jun 2020 14:00)  HR: 108 (04 Jun 2020 08:31) (78 - 110)  BP: 108/61 (04 Jun 2020 08:31) (108/61 - 137/65)  BP(mean): --  ABP: --  ABP(mean): --  RR: 18 (04 Jun 2020 08:31) (18 - 24)  SpO2: 92% (04 Jun 2020 08:31) (92% - 98%)        06-03 @ 07:01  -  06-04 @ 07:00  --------------------------------------------------------  IN: 590 mL / OUT: 0 mL / NET: 590 mL      CAPILLARY BLOOD GLUCOSE          PHYSICAL EXAM:  General:   HEENT:   Lymph Nodes:  Neck:   Respiratory:   Cardiovascular:   Abdomen:   Extremities:   Skin:   Neurological:  Psychiatry:    LINES:     HOSPITAL MEDICATIONS:  Standing Meds:  heparin  Infusion.  Unit(s)/Hr IV Continuous <Continuous>  multivitamin 1 Tablet(s) Oral daily  pantoprazole  Injectable 40 milliGRAM(s) IV Push every 12 hours      PRN Meds:  acetaminophen   Tablet .. 650 milliGRAM(s) Oral every 4 hours PRN  heparin   Injectable 6000 Unit(s) IV Push every 6 hours PRN  heparin   Injectable 3000 Unit(s) IV Push every 6 hours PRN  ondansetron Injectable 4 milliGRAM(s) IV Push every 6 hours PRN  oxyCODONE    IR 5 milliGRAM(s) Oral every 4 hours PRN      LABS:                        7.8    10.40 )-----------( 521      ( 04 Jun 2020 05:58 )             25.0     Hgb Trend: 7.8<--, 7.7<--, 7.8<--, 6.4<--  06-04    139  |  102  |  9   ----------------------------<  104<H>  3.7   |  24  |  0.56    Ca    9.0      04 Jun 2020 05:03    TPro  5.8<L>  /  Alb  2.8<L>  /  TBili  0.5  /  DBili  x   /  AST  57<H>  /  ALT  18  /  AlkPhos  115  06-04    Creatinine Trend: 0.56<--, 0.58<--  PT/INR - ( 03 Jun 2020 15:11 )   PT: 14.1 sec;   INR: 1.23 ratio         PTT - ( 03 Jun 2020 15:11 )  PTT:29.1 sec      Venous Blood Gas:  06-03 @ 15:11  7.48/40/31/30/48  VBG Lactate: 2.1      MICROBIOLOGY:       RADIOLOGY:  [ ] Reviewed and interpreted by me    PULMONARY FUNCTION TESTS:    EKG: CHIEF COMPLAINT:    HPI: Patient is a 70 yo F w/ R breast cancer (dx 2018, s/p chemo and embolization (last 6 months PTA), iron def anemia, MVP admitted on 6/3 for SOB and RUE pain x 1 month.  As per chart and patient, reports progressive FUNK over past few weeks. Also endorses RUE extremity pain and swelling, with occasional numbness and tingling in her fingers. Endorses generalized weakness and decreased appetite, diffuse pain most prominent in her back. Denies fevers, chills, chest pain, cough, dizziness.     Admission labs notable for Hb 6.4, Alk P 130, AST 62, Lact 2.1. 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 RUE duplex noted R brachial DVT.     Patient recieved 1 un PRBC overnight and was started on a heparin gtt. Pulmonary consulted for evaluation of lung findings and for pre EGD evaluation.        PAST MEDICAL & SURGICAL HISTORY:  Lymphedema: rt arm  H/O hemorrhoids  Mitral valve prolapse  Anemia  Breast cancer  H/O inguinal hernia repair  H/O umbilical hernia repair  H/O hemorrhoidectomy      FAMILY HISTORY:  FH: hyperlipidemia: brother  FH: hypertension: mother, brother      SOCIAL HISTORY:  Smoking: [ ] Never Smoked [ ] Former Smoker (__ packs x ___ years) [ ] Current Smoker  (__ packs x ___ years)  Substance Use: [ ] Never Used [ ] Used ____  EtOH Use:  Marital Status: [ ] Single [ ]  [ ]  [ ]   Sexual History:   Occupation:  Recent Travel:  Country of Birth:  Advance Directives:    Allergies    No Known Allergies    Intolerances        HOME MEDICATIONS:  Home Medications:  Multiple Vitamins oral capsule: 1 cap(s) orally once a day (03 Jun 2020 20:58)  Vitamin B Complex 100: tab(s) orally (03 Jun 2020 20:58)      REVIEW OF SYSTEMS:  Constitutional: [ ] negative [ ] fevers [ ] chills [ ] weight loss [ ] weight gain  HEENT: [ ] negative [ ] dry eyes [ ] eye irritation [ ] postnasal drip [ ] nasal congestion  CV: [ ] negative  [ ] chest pain [ ] orthopnea [ ] palpitations [ ] murmur  Resp: [ ] negative [ ] cough [ ] shortness of breath [ ] dyspnea [ ] wheezing [ ] sputum [ ] hemoptysis  GI: [ ] negative [ ] nausea [ ] vomiting [ ] diarrhea [ ] constipation [ ] abd pain [ ] dysphagia   : [ ] negative [ ] dysuria [ ] nocturia [ ] hematuria [ ] increased urinary frequency  Musculoskeletal: [ ] negative [ ] back pain [ ] myalgias [ ] arthralgias [ ] fracture  Skin: [ ] negative [ ] rash [ ] itch  Neurological: [ ] negative [ ] headache [ ] dizziness [ ] syncope [ ] weakness [ ] numbness  Psychiatric: [ ] negative [ ] anxiety [ ] depression  Endocrine: [ ] negative [ ] diabetes [ ] thyroid problem  Hematologic/Lymphatic: [ ] negative [ ] anemia [ ] bleeding problem  Allergic/Immunologic: [ ] negative [ ] itchy eyes [ ] nasal discharge [ ] hives [ ] angioedema  [ ] All other systems negative  [ ] Unable to assess ROS because ________    OBJECTIVE:  ICU Vital Signs Last 24 Hrs  T(C): 36.8 (04 Jun 2020 08:31), Max: 37.5 (03 Jun 2020 14:00)  T(F): 98.3 (04 Jun 2020 08:31), Max: 99.5 (03 Jun 2020 14:00)  HR: 108 (04 Jun 2020 08:31) (78 - 110)  BP: 108/61 (04 Jun 2020 08:31) (108/61 - 137/65)  BP(mean): --  ABP: --  ABP(mean): --  RR: 18 (04 Jun 2020 08:31) (18 - 24)  SpO2: 92% (04 Jun 2020 08:31) (92% - 98%)        06-03 @ 07:01  -  06-04 @ 07:00  --------------------------------------------------------  IN: 590 mL / OUT: 0 mL / NET: 590 mL      CAPILLARY BLOOD GLUCOSE          PHYSICAL EXAM:  General:   HEENT:   Lymph Nodes:  Neck:   Respiratory:   Cardiovascular:   Abdomen:   Extremities:   Skin:   Neurological:  Psychiatry:    LINES:     HOSPITAL MEDICATIONS:  Standing Meds:  heparin  Infusion.  Unit(s)/Hr IV Continuous <Continuous>  multivitamin 1 Tablet(s) Oral daily  pantoprazole  Injectable 40 milliGRAM(s) IV Push every 12 hours      PRN Meds:  acetaminophen   Tablet .. 650 milliGRAM(s) Oral every 4 hours PRN  heparin   Injectable 6000 Unit(s) IV Push every 6 hours PRN  heparin   Injectable 3000 Unit(s) IV Push every 6 hours PRN  ondansetron Injectable 4 milliGRAM(s) IV Push every 6 hours PRN  oxyCODONE    IR 5 milliGRAM(s) Oral every 4 hours PRN      LABS:                        7.8    10.40 )-----------( 521      ( 04 Jun 2020 05:58 )             25.0     Hgb Trend: 7.8<--, 7.7<--, 7.8<--, 6.4<--  06-04    139  |  102  |  9   ----------------------------<  104<H>  3.7   |  24  |  0.56    Ca    9.0      04 Jun 2020 05:03    TPro  5.8<L>  /  Alb  2.8<L>  /  TBili  0.5  /  DBili  x   /  AST  57<H>  /  ALT  18  /  AlkPhos  115  06-04    Creatinine Trend: 0.56<--, 0.58<--  PT/INR - ( 03 Jun 2020 15:11 )   PT: 14.1 sec;   INR: 1.23 ratio         PTT - ( 03 Jun 2020 15:11 )  PTT:29.1 sec      Venous Blood Gas:  06-03 @ 15:11  7.48/40/31/30/48  VBG Lactate: 2.1      MICROBIOLOGY:       RADIOLOGY:  [ ] Reviewed and interpreted by me    PULMONARY FUNCTION TESTS:    EKG: CHIEF COMPLAINT:    HPI: Patient is a 68 yo F w/ R breast cancer (dx 2018, s/p chemo and embolization during 9/2019 [facility] hospitalization but did not follow up), iron def anemia, MVP admitted on 6/3 for SOB and RUE pain x 1 month.  As per chart and patient, reports progressive FUNK over past few weeks. Also endorses RUE extremity pain and swelling, with occasional numbness and tingling in her fingers. Endorses generalized weakness and decreased appetite, diffuse pain most prominent in her back. Denies fevers, chills, chest pain, cough, dizziness.     Admission labs notable for Hb 6.4, Alk P 130, AST 62, Lact 2.1. 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 RUE duplex noted R brachial DVT.     Patient recieved 1 un PRBC overnight and was started on a heparin gtt. Pulmonary consulted for evaluation of lung findings and for pre EGD evaluation.        PAST MEDICAL & SURGICAL HISTORY:  Lymphedema: rt arm  H/O hemorrhoids  Mitral valve prolapse  Anemia  Breast cancer  H/O inguinal hernia repair  H/O umbilical hernia repair  H/O hemorrhoidectomy      FAMILY HISTORY:  FH: hyperlipidemia: brother  FH: hypertension: mother, brother      SOCIAL HISTORY:  Smoking: [X Never Smoked [ ] Former Smoker (__ packs x ___ years) [ ] Current Smoker  (__ packs x ___ years)  Substance Use: [X] Never Used [ ] Used ____  EtOH Use: None  Marital Status: [ ] Single [ ]  [ ]  [ ]   Sexual History:   Occupation: Retired   Recent Travel:  Country of Birth: [place]  Advance Directives:    Allergies    No Known Allergies    Intolerances        HOME MEDICATIONS:  Home Medications:  Multiple Vitamins oral capsule: 1 cap(s) orally once a day (03 Jun 2020 20:58)  Vitamin B Complex 100: tab(s) orally (03 Jun 2020 20:58)      REVIEW OF SYSTEMS:  Constitutional: [ ] negative [ ] fevers [ ] chills [ ] weight loss [ ] weight gain  HEENT: [ ] negative [ ] dry eyes [ ] eye irritation [ ] postnasal drip [ ] nasal congestion  CV: [ ] negative  [X] chest pain [ ] orthopnea [ ] palpitations [ ] murmur  Resp: [ ] negative [ ] cough [X] shortness of breath [ ] dyspnea [ ] wheezing [ ] sputum [ ] hemoptysis  GI: [ ] negative [ ] nausea [ ] vomiting [ ] diarrhea [ ] constipation [ ] abd pain [ ] dysphagia   : [ ] negative [ ] dysuria [ ] nocturia [ ] hematuria [ ] increased urinary frequency  Musculoskeletal: [ ] negative [ ] back pain [X] myalgias [ ] arthralgias [ ] fracture  Skin: [ ] negative [ ] rash [ ] itch  Neurological: [ ] negative [ ] headache [ ] dizziness [ ] syncope [ ] weakness [ ] numbness  Psychiatric: [ ] negative [ ] anxiety [ ] depression  Endocrine: [ ] negative [ ] diabetes [ ] thyroid problem  Hematologic/Lymphatic: [ ] negative [X] anemia [ ] bleeding problem  Allergic/Immunologic: [ ] negative [ ] itchy eyes [ ] nasal discharge [ ] hives [ ] angioedema  [X] All other systems negative  [ ] Unable to assess ROS because ________    OBJECTIVE:  ICU Vital Signs Last 24 Hrs  T(C): 36.8 (04 Jun 2020 08:31), Max: 37.5 (03 Jun 2020 14:00)  T(F): 98.3 (04 Jun 2020 08:31), Max: 99.5 (03 Jun 2020 14:00)  HR: 108 (04 Jun 2020 08:31) (78 - 110)  BP: 108/61 (04 Jun 2020 08:31) (108/61 - 137/65)  BP(mean): --  ABP: --  ABP(mean): --  RR: 18 (04 Jun 2020 08:31) (18 - 24)  SpO2: 92% (04 Jun 2020 08:31) (92% - 98%)        06-03 @ 07:01  -  06-04 @ 07:00  --------------------------------------------------------  IN: 590 mL / OUT: 0 mL / NET: 590 mL      CAPILLARY BLOOD GLUCOSE          PHYSICAL EXAM:  General: NAD, resting comfortably  HEENT: EOMI, PERRLA  Respiratory: CTAB, distant breath sounds R anteriorly  Cardiovascular: S1S2, RRR  Abdomen: Soft, NTND, BS+  Extremities: No peripheral edema  Skin: Thickened irregular nodular skin over R breast with granulation tissue present    LINES:     HOSPITAL MEDICATIONS:  Standing Meds:  heparin  Infusion.  Unit(s)/Hr IV Continuous <Continuous>  multivitamin 1 Tablet(s) Oral daily  pantoprazole  Injectable 40 milliGRAM(s) IV Push every 12 hours      PRN Meds:  acetaminophen   Tablet .. 650 milliGRAM(s) Oral every 4 hours PRN  heparin   Injectable 6000 Unit(s) IV Push every 6 hours PRN  heparin   Injectable 3000 Unit(s) IV Push every 6 hours PRN  ondansetron Injectable 4 milliGRAM(s) IV Push every 6 hours PRN  oxyCODONE    IR 5 milliGRAM(s) Oral every 4 hours PRN      LABS:                        7.8    10.40 )-----------( 521      ( 04 Jun 2020 05:58 )             25.0     Hgb Trend: 7.8<--, 7.7<--, 7.8<--, 6.4<--  06-04    139  |  102  |  9   ----------------------------<  104<H>  3.7   |  24  |  0.56    Ca    9.0      04 Jun 2020 05:03    TPro  5.8<L>  /  Alb  2.8<L>  /  TBili  0.5  /  DBili  x   /  AST  57<H>  /  ALT  18  /  AlkPhos  115  06-04    Creatinine Trend: 0.56<--, 0.58<--  PT/INR - ( 03 Jun 2020 15:11 )   PT: 14.1 sec;   INR: 1.23 ratio         PTT - ( 03 Jun 2020 15:11 )  PTT:29.1 sec      Venous Blood Gas:  06-03 @ 15:11  7.48/40/31/30/48  VBG Lactate: 2.1      MICROBIOLOGY:       RADIOLOGY:  [ ] Reviewed and interpreted by me    PULMONARY FUNCTION TESTS:    EKG: CHIEF COMPLAINT:    HPI: Patient is a 70 yo F w/ R breast cancer (dx 2018, s/p chemo and embolization during 9/2019 [facility] hospitalization but did not follow up), iron def anemia, MVP admitted on 6/3 for SOB and RUE pain x 1 month.  As per chart and patient, reports progressive FUNK over past few weeks. Also endorses RUE extremity pain and swelling, with occasional numbness and tingling in her fingers. Endorses generalized weakness and decreased appetite, diffuse pain most prominent in her back. Denies fevers, chills, chest pain, cough, dizziness.     Admission labs notable for Hb 6.4, Alk P 130, AST 62, Lact 2.1. 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 RUE duplex noted R brachial DVT.     Patient recieved 1 un PRBC overnight and was started on a heparin gtt. Pulmonary consulted for evaluation of lung findings and for pre EGD evaluation.        PAST MEDICAL & SURGICAL HISTORY:  Lymphedema: rt arm  H/O hemorrhoids  Mitral valve prolapse  Anemia  Breast cancer  H/O inguinal hernia repair  H/O umbilical hernia repair  H/O hemorrhoidectomy      FAMILY HISTORY:  FH: hyperlipidemia: brother  FH: hypertension: mother, brother      SOCIAL HISTORY:  Smoking: [X Never Smoked [ ] Former Smoker (__ packs x ___ years) [ ] Current Smoker  (__ packs x ___ years)  Substance Use: [X] Never Used [ ] Used ____  EtOH Use: None  Marital Status: [ ] Single [ ]  [ ]  [ ]   Sexual History:   Occupation: Retired   Recent Travel:  Country of Birth: [place]  Advance Directives:    Allergies    No Known Allergies    Intolerances        HOME MEDICATIONS:  Home Medications:  Multiple Vitamins oral capsule: 1 cap(s) orally once a day (03 Jun 2020 20:58)  Vitamin B Complex 100: tab(s) orally (03 Jun 2020 20:58)      REVIEW OF SYSTEMS:  Constitutional: [ ] negative [ ] fevers [ ] chills [ ] weight loss [ ] weight gain  HEENT: [ ] negative [ ] dry eyes [ ] eye irritation [ ] postnasal drip [ ] nasal congestion  CV: [ ] negative  [X] chest pain [ ] orthopnea [ ] palpitations [ ] murmur  Resp: [ ] negative [ ] cough [X] shortness of breath [ ] dyspnea [ ] wheezing [ ] sputum [ ] hemoptysis  GI: [ ] negative [ ] nausea [ ] vomiting [ ] diarrhea [ ] constipation [ ] abd pain [ ] dysphagia   : [ ] negative [ ] dysuria [ ] nocturia [ ] hematuria [ ] increased urinary frequency  Musculoskeletal: [ ] negative [ ] back pain [X] myalgias [ ] arthralgias [ ] fracture  Skin: [ ] negative [ ] rash [ ] itch  Neurological: [ ] negative [ ] headache [ ] dizziness [ ] syncope [ ] weakness [ ] numbness  Psychiatric: [ ] negative [ ] anxiety [ ] depression  Endocrine: [ ] negative [ ] diabetes [ ] thyroid problem  Hematologic/Lymphatic: [ ] negative [X] anemia [ ] bleeding problem  Allergic/Immunologic: [ ] negative [ ] itchy eyes [ ] nasal discharge [ ] hives [ ] angioedema  [X] All other systems negative  [ ] Unable to assess ROS because ________    OBJECTIVE:  ICU Vital Signs Last 24 Hrs  T(C): 36.8 (04 Jun 2020 08:31), Max: 37.5 (03 Jun 2020 14:00)  T(F): 98.3 (04 Jun 2020 08:31), Max: 99.5 (03 Jun 2020 14:00)  HR: 108 (04 Jun 2020 08:31) (78 - 110)  BP: 108/61 (04 Jun 2020 08:31) (108/61 - 137/65)  BP(mean): --  ABP: --  ABP(mean): --  RR: 18 (04 Jun 2020 08:31) (18 - 24)  SpO2: 92% (04 Jun 2020 08:31) (92% - 98%)        06-03 @ 07:01  -  06-04 @ 07:00  --------------------------------------------------------  IN: 590 mL / OUT: 0 mL / NET: 590 mL      CAPILLARY BLOOD GLUCOSE          PHYSICAL EXAM:  General: NAD, resting comfortably  HEENT: EOMI, PERRLA  Respiratory: CTAB, distant breath sounds R anteriorly  Cardiovascular: S1S2, RRR  Abdomen: Soft, NTND, BS+  Extremities: No peripheral edema  Skin: Thickened irregular nodular skin over R breast with granulation tissue present    LINES:     HOSPITAL MEDICATIONS:  Standing Meds:  heparin  Infusion.  Unit(s)/Hr IV Continuous <Continuous>  multivitamin 1 Tablet(s) Oral daily  pantoprazole  Injectable 40 milliGRAM(s) IV Push every 12 hours      PRN Meds:  acetaminophen   Tablet .. 650 milliGRAM(s) Oral every 4 hours PRN  heparin   Injectable 6000 Unit(s) IV Push every 6 hours PRN  heparin   Injectable 3000 Unit(s) IV Push every 6 hours PRN  ondansetron Injectable 4 milliGRAM(s) IV Push every 6 hours PRN  oxyCODONE    IR 5 milliGRAM(s) Oral every 4 hours PRN      LABS:                        7.8    10.40 )-----------( 521      ( 04 Jun 2020 05:58 )             25.0     Hgb Trend: 7.8<--, 7.7<--, 7.8<--, 6.4<--  06-04    139  |  102  |  9   ----------------------------<  104<H>  3.7   |  24  |  0.56    Ca    9.0      04 Jun 2020 05:03    TPro  5.8<L>  /  Alb  2.8<L>  /  TBili  0.5  /  DBili  x   /  AST  57<H>  /  ALT  18  /  AlkPhos  115  06-04    Creatinine Trend: 0.56<--, 0.58<--  PT/INR - ( 03 Jun 2020 15:11 )   PT: 14.1 sec;   INR: 1.23 ratio         PTT - ( 03 Jun 2020 15:11 )  PTT:29.1 sec      Venous Blood Gas:  06-03 @ 15:11  7.48/40/31/30/48  VBG Lactate: 2.1    < from: CT Angio Chest w/ IV Cont (06.03.20 @ 16:24) >    No pulmonary embolism.    Nodular right breast skin thickening with underlying inflammation extending to the chest wall musculature consistent with recurrent invasive breast cancer. Widespread metastases above and below the diaphragm with lymphadenopathy, as described above.    Interlobular septal thickening more prominent on the right, may reflect mild pulmonary edema however lymphangitic carcinomatosis can be considered.    Small right pleural effusion.      < end of copied text >    MICROBIOLOGY:       RADIOLOGY:  [ ] Reviewed and interpreted by me    PULMONARY FUNCTION TESTS:    EKG:

## 2020-06-04 NOTE — CONSULT NOTE ADULT - PROBLEM SELECTOR RECOMMENDATION 4
Due to her lethargy and weakness, I was not able to d/w the patient about GOC. I tried to call her emergency contact (Chandler Vasquez) but he did not answer nor there was a way to leave a message.   At this point she is full code.

## 2020-06-04 NOTE — CONSULT NOTE ADULT - SUBJECTIVE AND OBJECTIVE BOX
HPI:  Patient is a 69 year old female with right breast cancer (diagnosed in 2018) s/p chemotherapy and radiation treatment ( last session about 6 months prior to admission),  anemia, MVP, presents for shortness of breath and right arm pain for the past month.  Patient reports progressive dyspnea on exertion over the past few weeks , and now experiences SOB with minimal exertion. She also notes pain and swelling in her right upper extremity during this time, and for the past week she reports occasional numbness and tingling in her fingers. She reports generalized weakness and decreased appetite. She reports diffuse pain most prominent in her back for which she took Aleve with minimal response.  She has no fever or chills. She reports no chest pain. No cough. She has no lightheadedness.  She reports no blood in stool, but did have dark soft stool , which she attributed to iron supplements. PE was ruled out; however, there is concern for lymphangitic spread. She was also found to have DVT of her RUE and Anemia.     Palliative Care was called for GOC and symptoms Rx.      PERTINENT PM/SXH:   Lymphedema  H/O hemorrhoids  Mitral valve prolapse  Anemia  Breast cancer    H/O inguinal hernia repair  H/O umbilical hernia repair  H/O hemorrhoidectomy    FAMILY HISTORY:  FH: hyperlipidemia: brother  FH: hypertension: mother, brother    ITEMS NOT CHECKED ARE NOT PRESENT    SOCIAL HISTORY:   Significant other/partner[ ]  Children[ ]  Buddhist/Spirituality:  Substance hx:  [ ]   Tobacco hx:  [ ]   Alcohol hx: [ ]   Home Opioid hx:  [ ] I-Stop Reference No:  Living Situation: [ ]Home  [ ]Long term care  [ ]Rehab [ ]Other          ADVANCE DIRECTIVES:    DNR  MOLST  [ ]  Living Will  [ ]   DECISION MAKER(s):  [ ] Health Care Proxy(s)  [ ] Surrogate(s)  [ ] Guardian           Name(s): Phone Number(s):    BASELINE (I)ADL(s) (prior to admission):  [place]: [ ]Total  [ ] Moderate [ ]Dependent    Allergies    No Known Allergies    Intolerances    MEDICATIONS  (STANDING):  heparin  Infusion.  Unit(s)/Hr (13 mL/Hr) IV Continuous <Continuous>  methylPREDNISolone sodium succinate Injectable 40 milliGRAM(s) IV Push daily  multivitamin 1 Tablet(s) Oral daily  pantoprazole  Injectable 40 milliGRAM(s) IV Push every 12 hours  senna 2 Tablet(s) Oral at bedtime    MEDICATIONS  (PRN):  acetaminophen   Tablet .. 650 milliGRAM(s) Oral every 4 hours PRN Mild Pain (1 - 3)  heparin   Injectable 6000 Unit(s) IV Push every 6 hours PRN For aPTT less than 40  heparin   Injectable 3000 Unit(s) IV Push every 6 hours PRN For aPTT between 40 - 57  ondansetron Injectable 4 milliGRAM(s) IV Push every 6 hours PRN Nausea  oxyCODONE    IR 5 milliGRAM(s) Oral every 4 hours PRN Moderate Pain (4 - 6)    PRESENT SYMPTOMS: [ ]Unable to obtain due to poor mentation   Source if other than patient:  [ ]Family   [ ]Team     Pain: [ ]yes [ ]no  QOL impact -   Location -                    Aggravating factors -  Quality -  Radiation -  Timing-  Severity (0-10 scale):  Minimal acceptable level (0-10 scale):     CPOT:    https://www.[facility].org/getattachment/[email or web address]/Critical-Care-Pain-Observation-Tool-(CPOT)      PAIN AD Score:     http://geriatrictoolkit.[facility]/cog/painad.pdf (press ctrl +  left click to view)    Dyspnea:                           [ ]Mild [ ]Moderate [ ]Severe  Anxiety:                             [ ]Mild [ ]Moderate [ ]Severe  Fatigue:                             [ ]Mild [ ]Moderate [ ]Severe  Nausea:                             [ ]Mild [ ]Moderate [ ]Severe  Loss of appetite:              [ ]Mild [ ]Moderate [ ]Severe  Constipation:                    [ ]Mild [ ]Moderate [ ]Severe    Other Symptoms:  [ ]All other review of systems negative     Palliative Performance Status Version 2:         %    http://npcrc.org/files/news/palliative_performance_scale_ppsv2.pdf  PHYSICAL EXAM:  Vital Signs Last 24 Hrs  T(C): 36.8 (04 Jun 2020 08:31), Max: 37.5 (03 Jun 2020 14:00)  T(F): 98.3 (04 Jun 2020 08:31), Max: 99.5 (03 Jun 2020 14:00)  HR: 108 (04 Jun 2020 08:31) (78 - 110)  BP: 108/61 (04 Jun 2020 08:31) (108/61 - 137/65)  BP(mean): --  RR: 18 (04 Jun 2020 08:31) (18 - 24)  SpO2: 92% (04 Jun 2020 08:31) (92% - 98%) I&O's Summary    03 Jun 2020 07:01  -  04 Jun 2020 07:00  --------------------------------------------------------  IN: 590 mL / OUT: 0 mL / NET: 590 mL    04 Jun 2020 07:01  -  04 Jun 2020 13:50  --------------------------------------------------------  IN: 180 mL / OUT: 0 mL / NET: 180 mL      GENERAL:  [ ]Alert  [ ]Oriented x   [ ]Lethargic  [ ]Cachexia  [ ]Unarousable  [ ]Verbal  [ ]Non-Verbal  Behavioral:   [ ] Anxiety  [ ] Delirium [ ] Agitation [ ] Other  HEENT:  [ ]Normal   [ ]Dry mouth   [ ]ET Tube/Trach  [ ]Oral lesions  PULMONARY:   [ ]Clear [ ]Tachypnea  [ ]Audible excessive secretions   [ ]Rhonchi        [ ]Right [ ]Left [ ]Bilateral  [ ]Crackles        [ ]Right [ ]Left [ ]Bilateral  [ ]Wheezing     [ ]Right [ ]Left [ ]Bilateral  [ ]Diminished breath sounds [ ]right [ ]left [ ]bilateral  CARDIOVASCULAR:    [ ]Regular [ ]Irregular [ ]Tachy  [ ]Abenr [ ]Murmur [ ]Other  GASTROINTESTINAL:  [ ]Soft  [ ]Distended   [ ]+BS  [ ]Non tender [ ]Tender  [ ]PEG [ ]OGT/ NGT  Last BM:     GENITOURINARY:  [ ]Normal [ ] Incontinent   [ ]Oliguria/Anuria   [ ]Birch  MUSCULOSKELETAL:   [ ]Normal   [ ]Weakness  [ ]Bed/Wheelchair bound [ ]Edema  NEUROLOGIC:   [ ]No focal deficits  [ ]Cognitive impairment  [ ]Dysphagia [ ]Dysarthria [ ]Paresis [ ]Other   SKIN:   [ ]Normal    [ ]Rash  [ ]Pressure ulcer(s)       Present on admission [ ]y [ ]n    CRITICAL CARE:  [ ] Shock Present  [ ]Septic [ ]Cardiogenic [ ]Neurologic [ ]Hypovolemic  [ ]  Vasopressors [ ]  Inotropes   [ ]Respiratory failure present [ ]Mechanical ventilation [ ]Non-invasive ventilatory support [ ]High flow    [ ]Acute  [ ]Chronic [ ]Hypoxic  [ ]Hypercarbic [ ]Other  [ ]Other organ failure     LABS:                        7.8    10.40 )-----------( 521      ( 04 Jun 2020 05:58 )             25.0   06-04    139  |  102  |  9   ----------------------------<  104<H>  3.7   |  24  |  0.56    Ca    9.0      04 Jun 2020 05:03    TPro  5.8<L>  /  Alb  2.8<L>  /  TBili  0.5  /  DBili  x   /  AST  57<H>  /  ALT  18  /  AlkPhos  115  06-04  PT/INR - ( 03 Jun 2020 15:11 )   PT: 14.1 sec;   INR: 1.23 ratio         PTT - ( 03 Jun 2020 15:11 )  PTT:29.1 sec      RADIOLOGY & ADDITIONAL STUDIES:  < from: CT Angio Chest w/ IV Cont (06.03.20 @ 16:24) >    EXAM:  CT ANGIO CHEST (W)AW IC                            PROCEDURE DATE:  06/03/2020  IMPRESSION:     No pulmonary embolism.    Nodular right breast skin thickening with underlying inflammation extending to the chest wall musculature consistent with recurrent invasive breast cancer. Widespread metastases above and below the diaphragm with lymphadenopathy, as described above.    Interlobular septal thickening more prominent on the right, may reflect mild pulmonary edema however lymphangitic carcinomatosis can be considered.    Small right pleural effusion.                JANET ALVARADO M.D., RADIOLOGY RESIDENT  This document has been electronically signed.  NOMI MCKEON M.D., ATTENDING RADIOLOGIST  This document has been electronically signed. Omer  3 2020  5:05PM        < end of copied text >    PROTEIN CALORIE MALNUTRITION PRESENT: [ ]mild [ ]moderate [ ]severe [ ]underweight [ ]morbid obesity  https://www.andeal.org/vault/2440/web/files/ONC/Table_Clinical%20Characteristics%20to%20Document%20Malnutrition-White%20JV%20et%20al%202012.pdf    Height (cm): 167.6 (06-03-20 @ 20:28), 167.6 (09-17-19 @ 09:00)  Weight (kg): 74.2 (06-03-20 @ 20:28), 71.9 (09-17-19 @ 09:00)  BMI (kg/m2): 26.4 (06-03-20 @ 20:28), 25.6 (09-17-19 @ 09:00)    [ ]PPSV2 < or = to 30% [ ]significant weight loss  [ ]poor nutritional intake  [ ]anasarca     Albumin, Serum: 2.8 g/dL (06-04-20 @ 05:03)   [ ]Artificial Nutrition      REFERRALS:   [ ]Chaplaincy  [ ]Hospice  [ ]Child Life  [ ]Social Work  [ ]Case management [ ]Holistic Therapy     Goals of Care Document: HPI:  Patient is a 69 year old female with right breast cancer (diagnosed in 2018) s/p chemotherapy and radiation treatment ( last session about 6 months prior to admission),  anemia, MVP, presents for shortness of breath and right arm pain for the past month.  Patient reports progressive dyspnea on exertion over the past few weeks , and now experiences SOB with minimal exertion. She also notes pain and swelling in her right upper extremity during this time, and for the past week she reports occasional numbness and tingling in her fingers. She reports generalized weakness and decreased appetite. She reports diffuse pain most prominent in her back for which she took Aleve with minimal response.  She has no fever or chills. She reports no chest pain. No cough. She has no lightheadedness.  She reports no blood in stool, but did have dark soft stool , which she attributed to iron supplements. PE was ruled out; however, there is concern for lymphangitic spread. She was also found to have DVT of her RUE and Anemia.     Palliative Care was called for GOC and symptoms Rx.    Patient was seen and examined. She was lethargic and with tachypnea and mild labored breathing. Though alert when called; however, not able to keep up with a GOC discussion. In fact, she asked me to come back later. She did c/o pain over her RUE; however, she was not able to describe the characteristics of it. She was also c/o dyspnea. I tried to ask her about her family but she did not want to discuss about it at this time.         PERTINENT PM/SXH:   Lymphedema  H/O hemorrhoids  Mitral valve prolapse  Anemia  Breast cancer    H/O inguinal hernia repair  H/O umbilical hernia repair  H/O hemorrhoidectomy    FAMILY HISTORY:  FH: hyperlipidemia: brother  FH: hypertension: mother, brother    ITEMS NOT CHECKED ARE NOT PRESENT    SOCIAL HISTORY:   Significant other/partner[ ]   Children[ ]  Sikh/Spirituality: Unknown   Substance hx:  [ ]   Tobacco hx:  [ ]   Alcohol hx: [ ]   Home Opioid hx:  [ ] I-Stop Reference No: Reference #: [id number]  Living Situation: [ ]Home  [ ]Long term care  [ ]Rehab [ ]Other  Unknowns living situation.         ADVANCE DIRECTIVES:    DNR  MOLST  [ ]  Living Will  [ ]   DECISION MAKER(s):  [ ] Health Care Proxy(s)  [x ] Surrogate(s)  [ ] Guardian           Name(s): Phone Number(s): ?     BASELINE (I)ADL(s) (prior to admission):  [place]: [ ]Total  [ ] Moderate [ ]Dependent. Unknown.     Allergies    No Known Allergies    Intolerances    MEDICATIONS  (STANDING):  heparin  Infusion.  Unit(s)/Hr (13 mL/Hr) IV Continuous <Continuous>  methylPREDNISolone sodium succinate Injectable 40 milliGRAM(s) IV Push daily  multivitamin 1 Tablet(s) Oral daily  pantoprazole  Injectable 40 milliGRAM(s) IV Push every 12 hours  senna 2 Tablet(s) Oral at bedtime    MEDICATIONS  (PRN):  acetaminophen   Tablet .. 650 milliGRAM(s) Oral every 4 hours PRN Mild Pain (1 - 3)  heparin   Injectable 6000 Unit(s) IV Push every 6 hours PRN For aPTT less than 40  heparin   Injectable 3000 Unit(s) IV Push every 6 hours PRN For aPTT between 40 - 57  ondansetron Injectable 4 milliGRAM(s) IV Push every 6 hours PRN Nausea  oxyCODONE    IR 5 milliGRAM(s) Oral every 4 hours PRN Moderate Pain (4 - 6)    PRESENT SYMPTOMS: [x]Unable to obtain due to poor mentation   Source if other than patient:  [ ]Family   [ ]Team     Pain: [x ]yes [ ]no  QOL impact - not able to indicate   Location -         RUE   Aggravating factors - not able to indicate   Quality - not able to indicate   Radiation - not able to indicate   Timing- not able to indicate   Severity (0-10 scale): not able to indicate   Minimal acceptable level (0-10 scale):  not able to indicate.     CPOT:    https://www.[facility].org/getattachment/[email or web address]/Critical-Care-Pain-Observation-Tool-(CPOT)      PAIN AD Score: 1-2    http://geriatrictoolkit.[employer].edu/cog/painad.pdf (press ctrl +  left click to view)    Dyspnea:                           [ ]Mild [ ]Moderate [ ]Severe  Anxiety:                             [ ]Mild [ ]Moderate [ ]Severe  Fatigue:                             [ ]Mild [ ]Moderate [ ]Severe  Nausea:                             [ ]Mild [ ]Moderate [ ]Severe  Loss of appetite:              [ ]Mild [ ]Moderate [ ]Severe  Constipation:                    [ ]Mild [ ]Moderate [ ]Severe    Other Symptoms:  [ ]All other review of systems negative     Palliative Performance Status Version 2:         %    http://npcrc.org/files/news/palliative_performance_scale_ppsv2.pdf  PHYSICAL EXAM:  Vital Signs Last 24 Hrs  T(C): 36.8 (04 Jun 2020 08:31), Max: 37.5 (03 Jun 2020 14:00)  T(F): 98.3 (04 Jun 2020 08:31), Max: 99.5 (03 Jun 2020 14:00)  HR: 108 (04 Jun 2020 08:31) (78 - 110)  BP: 108/61 (04 Jun 2020 08:31) (108/61 - 137/65)  BP(mean): --  RR: 18 (04 Jun 2020 08:31) (18 - 24)  SpO2: 92% (04 Jun 2020 08:31) (92% - 98%) I&O's Summary    03 Jun 2020 07:01  -  04 Jun 2020 07:00  --------------------------------------------------------  IN: 590 mL / OUT: 0 mL / NET: 590 mL    04 Jun 2020 07:01  -  04 Jun 2020 13:50  --------------------------------------------------------  IN: 180 mL / OUT: 0 mL / NET: 180 mL      GENERAL:  []Alert  [x]Oriented x 2-3 xLethargic  [ ]Cachexia  [ ]Unarousable  [x]Verbal  [ ]Non-Verbal  Behavioral:   [ ] Anxiety  [ ] Delirium [ ] Agitation [ ] Other  HEENT:  [ ]Normal   [x]Dry mouth   [ ]ET Tube/Trach  [ ]Oral lesions  PULMONARY:   [ ]Clear [x]Tachypnea  [ ]Audible excessive secretions   [ ]Rhonchi        [ ]Right [ ]Left [ ]Bilateral  [ ]Crackles        [ ]Right [ ]Left [ ]Bilateral  [ ]Wheezing     [ ]Right [ ]Left [ ]Bilateral  [x] labored breathing   [x]Diminished breath sounds [ ]right [ ]left [ xbilateral  CARDIOVASCULAR:    [x]Regular [ ]Irregular [ ]Tachy  [ ]Abner [ ]Murmur [ ]Other  GASTROINTESTINAL:  [x]Soft  [ ]Distended   [ ]+BS  [ ]Non tender [ ]Tender  [ ]PEG [ ]OGT/ NGT  Last BM:     GENITOURINARY:  [ ]Normal [ ] Incontinent   [ ]Oliguria/Anuria   [ ]Birch [x] Fungating mass over right breast   MUSCULOSKELETAL:   [ ]Normal   [ ]Weakness  [ ]Bed/Wheelchair bound [x]Edema RUE   NEUROLOGIC:   [ ]No focal deficits  [ ]Cognitive impairment  [ ]Dysphagia [ ]Dysarthria [ ]Paresis [ xOther: Lethargic   SKIN:   [ ]Normal    [ ]Rash  [ ]Pressure ulcer(s)       Present on admission [ ]y [ ]n [x] [x] Fungating mass over right breast     CRITICAL CARE:  [ ] Shock Present  [ ]Septic [ ]Cardiogenic [ ]Neurologic [ ]Hypovolemic  [ ]  Vasopressors [ ]  Inotropes   [ ]Respiratory failure present [ ]Mechanical ventilation [ ]Non-invasive ventilatory support [ ]High flow    [ ]Acute  [ ]Chronic [ ]Hypoxic  [ ]Hypercarbic [ ]Other  [ ]Other organ failure     LABS:                        7.8    10.40 )-----------( 521      ( 04 Jun 2020 05:58 )             25.0   06-04    139  |  102  |  9   ----------------------------<  104<H>  3.7   |  24  |  0.56    Ca    9.0      04 Jun 2020 05:03    TPro  5.8<L>  /  Alb  2.8<L>  /  TBili  0.5  /  DBili  x   /  AST  57<H>  /  ALT  18  /  AlkPhos  115  06-04  PT/INR - ( 03 Jun 2020 15:11 )   PT: 14.1 sec;   INR: 1.23 ratio         PTT - ( 03 Jun 2020 15:11 )  PTT:29.1 sec      RADIOLOGY & ADDITIONAL STUDIES:  < from: CT Angio Chest w/ IV Cont (06.03.20 @ 16:24) >    EXAM:  CT ANGIO CHEST (W)AW IC                            PROCEDURE DATE:  06/03/2020  IMPRESSION:     No pulmonary embolism.    Nodular right breast skin thickening with underlying inflammation extending to the chest wall musculature consistent with recurrent invasive breast cancer. Widespread metastases above and below the diaphragm with lymphadenopathy, as described above.    Interlobular septal thickening more prominent on the right, may reflect mild pulmonary edema however lymphangitic carcinomatosis can be considered.    Small right pleural effusion.                JANET ALVARADO M.D., RADIOLOGY RESIDENT  This document has been electronically signed.  NOMI MCKEON M.D., ATTENDING RADIOLOGIST  This document has been electronically signed. Omer  3 2020  5:05PM        < end of copied text >    PROTEIN CALORIE MALNUTRITION PRESENT: [ ]mild [ ]moderate [ ]severe [ ]underweight [ ]morbid obesity  https://www.andeal.org/vault/2440/web/files/ONC/Table_Clinical%20Characteristics%20to%20Document%20Malnutrition-White%20JV%20et%20al%202012.pdf    Height (cm): 167.6 (06-03-20 @ 20:28), 167.6 (09-17-19 @ 09:00)  Weight (kg): 74.2 (06-03-20 @ 20:28), 71.9 (09-17-19 @ 09:00)  BMI (kg/m2): 26.4 (06-03-20 @ 20:28), 25.6 (09-17-19 @ 09:00)    [ ]PPSV2 < or = to 30% [ ]significant weight loss  [ ]poor nutritional intake  [ ]anasarca     Albumin, Serum: 2.8 g/dL (06-04-20 @ 05:03)   [ ]Artificial Nutrition      REFERRALS:   [ ]Chaplaincy  [ ]Hospice  [ ]Child Life  [ ]Social Work  [ ]Case management [ ]Holistic Therapy     Goals of Care Document:

## 2020-06-05 NOTE — CONSULT NOTE ADULT - CONSULT REASON
Anemia
Breast cancer
Patient is a 68 yo F w/ stage 4 R breast cancer (dx 2018, s/p chemo and embolization during 9/2019 [facility] hospitalization but did not follow up), iron def anemia, MVP admitted on 6/3 for SOB and RUE pain x 1 month.  Pt reports progressive FUNK over past few weeks with  RUE extremity pain and swelling, with occasional numbness and tingling in her fingers. Endorses generalized weakness and decreased appetite, diffuse pain most prominent in her back.  Positive RUE DVT, negative PE, positive pleural effusion with lymph node involvement.
Pleural effusion
metastatic breast cancer
Right breast lesion

## 2020-06-05 NOTE — PROGRESS NOTE ADULT - PROBLEM SELECTOR PLAN 5
d/w with the patient about completing a HCP form and she though her brother was the person to such task. However, she did not want to complete a HCP form today. I left a form for her to read and complete at her discretion.

## 2020-06-05 NOTE — PROGRESS NOTE ADULT - PROBLEM SELECTOR PLAN 2
Patient d/w Onc about her current illness and Rx options (mainly chemo) and she is thinking about f/u with them as an OP.

## 2020-06-05 NOTE — PROGRESS NOTE ADULT - PROBLEM SELECTOR PLAN 2
-diagnosed in 2018, s/p chemo and radiation treatment which was done 6 months prior   -extensive skin changes on R chest wall   -onc consult  -palliative eval in progress   -poor prognosis   -seems that pt was lost to follow up from previous oncologist

## 2020-06-05 NOTE — CONSULT NOTE ADULT - ATTENDING COMMENTS
68 yo female who approx 2 yrs ago declined conventional treatment for a triple negative carcinoma of right breast, biopsy proven  Last at [facility] in Fall 2019, and reports subsequent RT as OP to right breast  Patient is significantly debilitated, at bedrest  Extensive local disease involving right breast , chest wall, right axilla, and right neck, with multiple malignant wounds  Radiation dermatitis right breast present with edema of right arm and hand, in setting of right brachial vein DVT  I have explained to patient that advised dressings ae strictly palliative Charts and prior [facility] chart reviewed  70 yo female who approx 2 yrs ago declined conventional treatment for a triple negative carcinoma of right breast, biopsy proven  Last at [facility] in Fall 2019, and reports subsequent RT as OP to right breast  Patient is significantly debilitated, at bedrest  Extensive local disease involving right breast , chest wall, right axilla, and right neck, with multiple malignant wounds  Metastatic disease is documented  Radiation dermatitis right breast present with edema of right arm and hand, in setting of right brachial vein DVT  I have explained to patient that advised dressings ae strictly palliative  Oncology f/u advised Charts and prior [facility] chart reviewed  70 yo female who approx 2 yrs ago declined conventional treatment for a triple negative carcinoma of right breast, biopsy proven  Last at 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

## 2020-06-05 NOTE — PROVIDER CONTACT NOTE (OTHER) - SITUATION
pt aptt 55.3 in [facility]; no populating in results tab on runrise; value received from augustin in laboratory;

## 2020-06-05 NOTE — PROGRESS NOTE ADULT - PROBLEM SELECTOR PLAN 3
Resolved but still with mild dyspnea.   Advised the patient to try to use Morphine for Dyspnea as well.  Continue Solumedrol   The patient is considering chemo as a Rx option.

## 2020-06-05 NOTE — CONSULT NOTE ADULT - SUBJECTIVE AND OBJECTIVE BOX
68 yo female known to medical oncologist Dr. Quintin Barnes in [facility] for triple negative breast cancer diagnosed in 2018.  Managed by alternative therapies as per patient's discretion.  She underwent palliative RT to the right breast with Dr. Nathaniel Orr.  She has not followed up for systemic chemotherapy.    now admitted w dyspnea.  and right arm pain for the past month.  Patient reports progressive dyspnea on exertion over the past few weeks , and now experiences SOB with minimal exertion. She also notes pain and swelling in her right upper extremity during this time, and for the past week she reports occasional numbness and tingling in her fingers. She reports generalized weakness and decreased appetite. She reports diffuse pain most prominent in her back for which she took Aleve with minimal response.       Noted to have RUE DVT.  Noted to have anemia and dark stool.  GI w/u pending.      PAST MEDICAL HISTORY:  Anemia     Breast cancer     H/O hemorrhoids     Lymphedema rt arm    Mitral valve prolapse.     PAST SURGICAL HISTORY:  H/O hemorrhoidectomy     H/O inguinal hernia repair     H/O umbilical hernia repair.     FAMILY HISTORY:  FH: hyperlipidemia, brother  FH: hypertension, mother, brother.    Social History:  Social History (marital status, living situation, occupation, tobacco use, alcohol and drug use, and sexual history): Social History:   Lives with: (  ) alone  (  ) children   (  ) spouse   (  ) parents  (x ) Brother   Substance Use (street drugs): (x  ) never used  (  ) other:  Tobacco Usage:  (  x ) never smoked, second hand smoke exposure    (   ) former smoker   (   ) current smoker  (     ) pack year  (        ) last cigarette date Alcohol Usage: no etoh use	    acetaminophen   Tablet .. 650 milliGRAM(s) Oral every 6 hours  enoxaparin Injectable 70 milliGRAM(s) SubCutaneous every 12 hours  FIRST- Mouthwash  BLM 5 milliLiter(s) Swish and Spit four times a day  gabapentin 100 milliGRAM(s) Oral at bedtime  methylPREDNISolone sodium succinate Injectable 40 milliGRAM(s) IV Push daily  morphine  - Injectable 1.5 milliGRAM(s) IV Push every 2 hours PRN  multivitamin 1 Tablet(s) Oral daily  ondansetron Injectable 4 milliGRAM(s) IV Push every 6 hours PRN  pantoprazole  Injectable 40 milliGRAM(s) IV Push every 12 hours  senna 2 Tablet(s) Oral at bedtime      No Known Allergies      ROS otherwise negative     T(C): 36.3 (06-05-20 @ 15:49), Max: 36.5 (06-05-20 @ 01:25)  HR: 103 (06-05-20 @ 15:49) (99 - 104)  BP: 120/74 (06-05-20 @ 15:49) (120/74 - 134/80)  RR: 18 (06-05-20 @ 15:49) (16 - 18)  SpO2: 95% (06-05-20 @ 15:49) (94% - 95%)  PHYSICAL EXAM  Gen:  laying in bed, nad  H:  anicteric, eomi    Right Breast:  large mass with skin ulceration;  right axillary mass;      Patient examination limited due to concerns for COVID19 cross transmission and potential local limitations for sufficient personal protective equipment.  Risks of physical examination therefore deemed to outweigh the benefit for the purposes of this encounter.                          8.0    10.26 )-----------( 526      ( 05 Jun 2020 07:01 )             26.0                         7.9    10.69 )-----------( 534      ( 04 Jun 2020 17:26 )             25.9                         7.8    10.40 )-----------( 521      ( 04 Jun 2020 05:58 )             25.0   06-05    147<H>  |  109<H>  |  9   ----------------------------<  150<H>  3.6   |  27  |  0.54    Ca    9.0      05 Jun 2020 11:40    TPro  5.8<L>  /  Alb  2.8<L>  /  TBili  0.5  /  DBili  x   /  AST  57<H>  /  ALT  18  /  AlkPhos  115  06-04       Occult Blood, Feces (06.03.20 @ 18:52)    Occult Blood, Feces: Negative    < from: [facility] Duplex Upper Ext Vein Scan, Right (06.03.20 @ 19:49) >  IMPRESSION:     Right brachial vein deep venous thrombosis.    < end of copied text >    < from: CT Angio Chest w/ IV Cont (06.03.20 @ 16:24) >  No pulmonary embolism.    Nodular right breast skin thickening with underlying inflammation extending to the chest wall musculature consistent with recurrent invasive breast cancer. Widespread metastases above and below the diaphragm with lymphadenopathy, as described above.    Interlobular septal thickening more prominent on the right, may reflect mild pulmonary edema however lymphangitic carcinomatosis can be considered.    Small right pleural effusion.    < end of copied text >

## 2020-06-05 NOTE — CONSULT NOTE ADULT - SUBJECTIVE AND OBJECTIVE BOX
Oncology Consult Note    HPI: Patient is a 69 year old female with PMH right triple negative invasive breast ductal carcinoma (diagnosed in 2018; was receiving regional chemo perfusion / embolization + radiation; no systemic chemotherapy, no surgery), anemia, MVP, presents for shortness of breath and right arm pain for the past month.  Patient reports progressive dyspnea on exertion over the past few weeks , and now experiences SOB with minimal exertion. She also notes pain and swelling in her right upper extremity during this time, and for the past week she reports occasional numbness and tingling in her fingers. She reports generalized weakness and decreased appetite. She reports diffuse pain most prominent in her back for which she took Aleve with minimal response.  She has no fever or chills. She reports no chest pain. No cough. She has no lightheadedness.  She reports no blood in stool, but did have dark soft stool , which she attributed to iron supplements.    Imaging this admission revealed a RUE DVT as well as a right sided pleural effusion, pulmonary edema vs lymphangitic spread, multiple mets above and below the diaphragm, and recurrence of invasive breast cancer extending into the R chest wall musculature.    Patient reports that she was receiving treatment in [place], under the care of Dr. Quintin Barnes; patient reports that she did not pursue systemic chemotherapy per patient's discretion. Per [facility] notes seen in [place] and from hx provided by patient, she underwent palliative RT to R Breast with Dr. Nathaniel Orr.     PAST MEDICAL & SURGICAL HISTORY:  Lymphedema: rt arm  H/O hemorrhoids  Mitral valve prolapse  Anemia  Breast cancer  H/O inguinal hernia repair  H/O umbilical hernia repair  H/O hemorrhoidectomy      FAMILY HISTORY:  FH: hyperlipidemia: brother  FH: hypertension: mother, brother      MEDICATIONS  (STANDING):  acetaminophen   Tablet .. 650 milliGRAM(s) Oral every 6 hours  enoxaparin Injectable 70 milliGRAM(s) SubCutaneous every 12 hours  FIRST- Mouthwash  BLM 5 milliLiter(s) Swish and Spit four times a day  gabapentin 100 milliGRAM(s) Oral at bedtime  methylPREDNISolone sodium succinate Injectable 40 milliGRAM(s) IV Push daily  multivitamin 1 Tablet(s) Oral daily  pantoprazole  Injectable 40 milliGRAM(s) IV Push every 12 hours  senna 2 Tablet(s) Oral at bedtime    MEDICATIONS  (PRN):  morphine  - Injectable 1.5 milliGRAM(s) IV Push every 2 hours PRN Moderate to Severe pain  ondansetron Injectable 4 milliGRAM(s) IV Push every 6 hours PRN Nausea      Allergies: No Known Allergies / Intolerances    SOCIAL HISTORY:   Lives with brother  Never smoked  Denies hx of alcohol or drug use    REVIEW OF SYSTEMS:    CONSTITUTIONAL: +weakness, no fevers or chills  EYES/ENT: No visual changes;  No dysphagia  NECK: No pain or stiffness  RESPIRATORY: No cough, wheezing, hemoptysis; + shortness of breath  CARDIOVASCULAR: No chest pain or palpitations; No lower extremity edema  EXTREMITIES: no le edema, cyanosis, clubbing  GASTROINTESTINAL: No abdominal or epigastric pain. +nausea,  no vomiting, or hematemesis; No diarrhea or constipation. +melena  no hematochezia.  BACK: + mid thoracic  back pain  GENITOURINARY: No dysuria, frequency or hematuria  NEUROLOGICAL: No numbness or weakness  MSK: right arm swelling as in HPI; right breast and right chest wall lesion  SKIN: No itching, burning, rashes, or lesions   PSYCH: no agitation  All other review of systems is negative unless indicated above.    T(F): 97.4 (06-05-20 @ 15:49), Max: 97.7 (06-05-20 @ 01:25)  HR: 103 (06-05-20 @ 15:49)  BP: 120/74 (06-05-20 @ 15:49)  RR: 18 (06-05-20 @ 15:49)  SpO2: 95% (06-05-20 @ 15:49)  Wt(kg): --    PHYSICAL EXAM:  GENERAL: NAD, depressed affect  EYES:  conjunctiva and sclera clear  NECK: Supple, No JVD  CHEST/LUNG: Clear to auscultation bilaterally; No wheeze  HEART: +S1/S2, reg   ABDOMEN: Soft, Nontender, Nondistended  EXTREMITIES: +RUE edema, difficulty with right hand movements. No LE edema   PSYCH: AAOx3  NEUROLOGY: Strength 4/5 in the LE b/l   SKIN: extensive skin changes with exudates on R chest wall                           8.0    10.26 )-----------( 526      ( 05 Jun 2020 07:01 )             26.0       06-05    147<H>  |  109<H>  |  9   ----------------------------<  150<H>  3.6   |  27  |  0.54    Ca    9.0      05 Jun 2020 11:40    TPro  5.8<L>  /  Alb  2.8<L>  /  TBili  0.5  /  DBili  x   /  AST  57<H>  /  ALT  18  /  AlkPhos  115  06-04    < from: CT Angio Chest w/ IV Cont (06.03.20 @ 16:24) >    EXAM:  CT ANGIO CHEST (W)AW IC                          PROCEDURE DATE:  06/03/2020    INTERPRETATION:  CLINICAL INFORMATION: Right upper extremity edema. Tachycardia and shortness of breath.    COMPARISON: CT chest 9/12/2019.    PROCEDURE:   CT Angiography of the Chest.  90 ml of Omnipaque 350 was injected intravenously. 10 ml were discarded.  Sagittal and coronal reformats were performed as well as 3D (MIP) reconstructions.    FINDINGS:    LUNGS AND AIRWAYS: Patent central airways.New bilateral pulmonary nodules likely reflecting metastatic disease, with a reference 1.1 cm nodule in the left lower lobe (2:99). Interlobular septal thickening more prominent on the right, may reflect mild pulmonary edema however lymphangitic carcinomatosis can be considered. Right lower lobe passive atelectasis.  PLEURA: Small right pleural effusion.  MEDIASTINUM AND NATHANIEL: Extensive mediastinal and bilateral hilar lymphadenopathy. A prevascular node measures 2.6 x 1.8 cm.  VESSELS: No pulmonary embolism. Narrowing of the right main pulmonary artery secondary to external compression from mediastinal adenopathy.  HEART: Heart size is normal. No pericardial effusion.    CHEST WALL AND LOWER NECK: Nodular right breast and chest wall skin thickening with a nodular masslike area of soft tissue in the retroareolar region. Extensive inflammatory change in the right breast extending to the chest wall musculature with loss of the normal fat planes. There is a hyperattenuating nodular mass in the infrascapular region measuring up to 9.0 x 4.7 cm, with numerous satellite nodules. Bilateral axillary and supracervical lymphadenopathy. Left breast skin thickening.      VISUALIZED UPPER ABDOMEN: Numerous ill-defined hepatic lesions measuring up to 5.0 x 3.6 cm (2:111), likely reflecting metastases. In enlarged retroperitoneal lymph nodes, with a reference node measuring 2.6 x 2.1 cm. Nodular bilateral adrenal thickening.    BONES: Lytic lesions in the T2, L1 and L2 vertebral bodies    IMPRESSION:     No pulmonary embolism.    Nodular right breast skin thickening with underlying inflammation extending to the chest wall musculature consistent with recurrent invasive breast cancer. Widespread metastases above and below the diaphragm with lymphadenopathy, as described above.    Interlobular septal thickening more prominent on the right, may reflect mild pulmonary edema however lymphangitic carcinomatosis can be considered.    Small right pleural effusion.    JANET ALVARADO M.D., RADIOLOGY RESIDENT  This document has been electronically signed.  NOMI MCKEON M.D., ATTENDING RADIOLOGIST  This document has been electronically signed. Omer  3 2020  5:05PM    < end of copied text >    < from: [facility] Duplex Upper Ext Vein Scan, Right (06.03.20 @ 19:49) >    EXAM:  DUPLEX EXT VEINS UPPER RT                          PROCEDURE DATE:  06/03/2020      INTERPRETATION:  CLINICAL INFORMATION: Right upper extremity swelling and pain.    COMPARISON: None available.    TECHNIQUE: Duplex sonography of theRIGHT UPPER extremity veins with color and spectral Doppler, with and without compression.      FINDINGS:  The right internal jugular, subclavian, and axillary veins are patent and compressible where applicable. Thrombosis of the right brachial vein.The basilic and cephalic veins (superficial veins) were not imaged.    Doppler examination shows normal spontaneous and phasic flow.    IMPRESSION:     Right brachial vein deep venous thrombosis.    These findings were discussed with Dr. Walton at the conclusion of the study by the ultrasound technologist performing the exam with read back confirmation.    KYRA MEEHAN M.D., RADIOLOGY RESIDENT  This document has been electronically signed.  VALERIE MCINTYRE M.D., ATTENDING RADIOLOGIST  This document has been electronically signed. Omer  3 2020  8:07PM    < end of copied text >

## 2020-06-05 NOTE — CONSULT NOTE ADULT - ASSESSMENT
68 yo female known to medical oncologist Dr. Quintin Barnes in [facility] for triple negative breast cancer diagnosed in 2018.  Managed by alternative therapies as per patient's discretion.  She underwent palliative RT to the right breast with Dr. Nathaniel Orr.  She has not followed up for systemic chemotherapy.    now admitted w dyspnea.  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  Noted to have anemia and dark stool.  GI w/u pending.    #ONC  -- will need outpatient PET or CT ab/p to complete extent of disease w/u and f/u with Dr. Barnes for systemic therapy options.    #Anemia-- likely related to burden of metastatic cancer;  EGD/Colonoscopy 6/5 noteworthy for diverticulosis  -transfuse to keep Hgb > 7  -consider capsule endoscopy  -check iron studies/b12/folate levels    #Brachial DVT  -agree to Lovenox;  monitor for GI bleed    #Dyspnea  -agree to solumedrol for ?lymphangitic carcinomatosis.        Everett Kahn MD  Hematology/Oncology  Cell:  [phone]  Office Phone: [phone]  Office Fax:  [phone] [address] [place] [address]

## 2020-06-05 NOTE — CONSULT NOTE ADULT - ATTENDING COMMENTS
69F w/ R  breast cancer (diagnosed in 2018; was receiving regional chemo perfusion / embolization + radiation), anemia, MVP, p/w SOB and RUE swelling, found to have RUE DVT in the setting of progression of metastatic triple negative breast cancer. Managed by alternative therapies as per patient's discretion, s/p palliative RT to the R breast with Dr. Nathaniel Orr. Has not followed up for systemic chemotherapy.    #Triple negative, metastatic breast cancer  -triple negative invasive ductal carcinoma per pathology from 2018 bx seen in [facility]  -progression of disease, with mets as above  -dyspnea in setting of small pleural effusion, and pulmonary edema vs lymphangitic spread (suspect the latter given absence of lower extremity edema)  -discussed with patient that systemic chemotherapy may potentially assist with symptoms by decreasing tumor size, but would not pursue inpatient chemotherapy  -DVT but without PE; agree with lovenox, monitor for bleeding in setting of positive occult blood  -Pain control per primary team; if dyspnea and pain improved / controlled, recommended that she follow up as an outpatient with oncology to discuss further treatment options  -House Oncology was consulted by primary team given hx of breast cancer; after patient was seen, primary team reported that patient's outpatient oncology group would see patient this admission (oncology note reviewed, appreciate assistance with coordination of care) 69F w/ R  breast cancer (diagnosed in 2018; was receiving regional chemo perfusion / embolization + radiation), anemia, MVP, p/w SOB and RUE swelling, found to have RUE DVT in the setting of progression of metastatic triple negative breast cancer. Managed by alternative therapies as per patient's discretion, s/p palliative RT to the R breast with Dr. Nathaniel Orr. Has not followed up for systemic chemotherapy. Continue AC for RUE DVT. Also has component of lymphedema 2/2 tumor progression. Pt endorses FUNK likely 2/2 pulm/pleural mets. Discussed systemic chemotherapy for palliation of symptoms. Pt wants to think about it. She can see us as outpt if she decides to proceed with palliative chemo.

## 2020-06-05 NOTE — CONSULT NOTE ADULT - SUBJECTIVE AND OBJECTIVE BOX
Wound Surgery Consult Note:    HPI:  Patient is a 69 year old female with PMH right breast cancer (diagnosed in 2018) s/p chemotherapy and radiation treatment ( last session about 6 months prior to admission),  anemia, MVP, presents for shortness of breath and right arm pain for the past month.  Patient reports progressive dyspnea on exertion over the past few weeks , and now experiences SOB with minimal exertion. She also notes pain and swelling in her right upper extremity during this time, and for the past week she reports occasional numbness and tingling in her fingers. She reports generalized weakness and decreased appetite. She reports diffuse pain most prominent in her back for which she took Aleve with minimal response.  She has no fever or chills. She reports no chest pain. No cough. She has no lightheadedness.  She reports no blood in stool, but did have dark soft stool , which she attributed to iron supplements. (03 Jun 2020 19:56)    Request for wound care consult received for chronic fungating Right breast lesion. Ms. Vasquez was encountered on an alternating air with low air loss surface having just returned from endoscopy. She was seen with Dr. Lomas.    PAST MEDICAL & SURGICAL HISTORY:  Lymphedema: rt arm  H/O hemorrhoids  Mitral valve prolapse  Anemia  Breast cancer  H/O inguinal hernia repair  H/O umbilical hernia repair  H/O hemorrhoidectomy    REVIEW OF SYSTEMS  General:	increased weakness  Skin/Breast: right breast CA with fungating lesion  Respiratory and Thorax: + SOB  Cardiovascular:	no CP  Gastrointestinal:	 no blood in stool  Genitourinary:	no dysuria  Musculoskeletal:	 chronic upper back pain  Neurological:	right hand and finger numbness and tingling on occasion  Hematology/Lymphatics:	 chronic anemia  Skin: chronic right breast lesion  Vascular: Right UE DVT	    MEDICATIONS  (STANDING):  acetaminophen   Tablet .. 650 milliGRAM(s) Oral every 6 hours  enoxaparin Injectable 80 milliGRAM(s) SubCutaneous every 12 hours  methylPREDNISolone sodium succinate Injectable 40 milliGRAM(s) IV Push daily  multivitamin 1 Tablet(s) Oral daily  pantoprazole  Injectable 40 milliGRAM(s) IV Push every 12 hours  senna 2 Tablet(s) Oral at bedtime    MEDICATIONS  (PRN):  morphine  - Injectable 1 milliGRAM(s) IV Push every 4 hours PRN Moderate to Severe pain  ondansetron Injectable 4 milliGRAM(s) IV Push every 6 hours PRN Nausea    Allergies    No Known Allergies    Intolerances    SOCIAL HISTORY:  Denies smoking, ETOH, drugs    FAMILY HISTORY:  FH: hyperlipidemia: brother  FH: hypertension: mother, brother    Vital Signs Last 24 Hrs  T(C): 36.4 (05 Jun 2020 10:30), Max: 37.5 (04 Jun 2020 15:39)  T(F): 97.5 (05 Jun 2020 10:30), Max: 99.5 (04 Jun 2020 15:39)  HR: 99 (05 Jun 2020 10:30) (99 - 108)  BP: 128/77 (05 Jun 2020 10:30) (128/77 - 134/81)  BP(mean): --  RR: 16 (05 Jun 2020 10:30) (16 - 18)  SpO2: 95% (05 Jun 2020 10:30) (93% - 95%)    Physical Exam:  General: NAD / gaurded but stable,  A&Ox3/ Alert/ Confused  cachectic/ MO/ Obese/ frail  WD/ WN/ WG/ Disheveled  Total Care Sport/ Versa Care P500 bed/ [place]    Cardiovascular: RRR (+)m    Respiratory: CTA    Gastrointestinal soft NT/ND (+)BS  (+)PEG (+)ostomy    Neurology  weakened strength & sensation grossly intact/ paraesthesia  nonverbal, no follow commands/ paraplegic    Musculoskeletal/Vascular:  ROM  >LE //BLE edema equal  DP/PT pulses palpable  BLE equally warm/ cool  no acute ischemia noted  hemosiderin staining  no deformities/ contractures    Skin:  moist w/ good turgor  frail,  ecchymosis w/o hematoma  serosanguinous drainage  No odor, erythema, increased warmth, tenderness, induration, fluctuance    LABS:  06-05    147<H>  |  109<H>  |  9   ----------------------------<  150<H>  3.6   |  27  |  0.54    Ca    9.0      05 Jun 2020 11:40    TPro  5.8<L>  /  Alb  2.8<L>  /  TBili  0.5  /  DBili  x   /  AST  57<H>  /  ALT  18  /  AlkPhos  115  06-04                          8.0    10.26 )-----------( 526      ( 05 Jun 2020 07:01 )             26.0     PT/INR - ( 03 Jun 2020 15:11 )   PT: 14.1 sec;   INR: 1.23 ratio         PTT - ( 05 Jun 2020 00:26 )  PTT:55.3 sec      RADIOLOGY & ADDITIONAL STUDIES:  Cultures: Wound Surgery Consult Note:    HPI:  Patient is a 69 year old female with PMH right breast cancer (diagnosed in 2018) s/p chemotherapy and radiation treatment ( last session about 6 months prior to admission),  anemia, MVP, presents for shortness of breath and right arm pain for the past month.  Patient reports progressive dyspnea on exertion over the past few weeks , and now experiences SOB with minimal exertion. She also notes pain and swelling in her right upper extremity during this time, and for the past week she reports occasional numbness and tingling in her fingers. She reports generalized weakness and decreased appetite. She reports diffuse pain most prominent in her back for which she took Aleve with minimal response.  She has no fever or chills. She reports no chest pain. No cough. She has no lightheadedness.  She reports no blood in stool, but did have dark soft stool , which she attributed to iron supplements. (03 Jun 2020 19:56)    Request for wound care consult received for chronic fungating Right breast lesion. Ms. Vasquez was encountered on an alternating air with low air loss surface having just returned from endoscopy. She was seen with Dr. Lomas.    PAST MEDICAL & SURGICAL HISTORY:  Lymphedema: rt arm  H/O hemorrhoids  Mitral valve prolapse  Anemia  Breast cancer  H/O inguinal hernia repair  H/O umbilical hernia repair  H/O hemorrhoidectomy    REVIEW OF SYSTEMS  General:	increased weakness  Skin/Breast: right breast CA with fungating lesion  Respiratory and Thorax: + SOB  Cardiovascular:	no CP  Gastrointestinal:	 no blood in stool  Genitourinary:	no dysuria  Musculoskeletal:	 chronic upper back pain  Neurological:	right hand and finger numbness and tingling on occasion  Hematology/Lymphatics:	 chronic anemia  Skin: chronic right breast lesion  Vascular: Right UE DVT	    MEDICATIONS  (STANDING):  acetaminophen   Tablet .. 650 milliGRAM(s) Oral every 6 hours  enoxaparin Injectable 80 milliGRAM(s) SubCutaneous every 12 hours  methylPREDNISolone sodium succinate Injectable 40 milliGRAM(s) IV Push daily  multivitamin 1 Tablet(s) Oral daily  pantoprazole  Injectable 40 milliGRAM(s) IV Push every 12 hours  senna 2 Tablet(s) Oral at bedtime    MEDICATIONS  (PRN):  morphine  - Injectable 1 milliGRAM(s) IV Push every 4 hours PRN Moderate to Severe pain  ondansetron Injectable 4 milliGRAM(s) IV Push every 6 hours PRN Nausea    Allergies    No Known Allergies    Intolerances    SOCIAL HISTORY:  Denies smoking, ETOH, drugs    FAMILY HISTORY:  FH: hyperlipidemia: brother  FH: hypertension: mother, brother    Vital Signs Last 24 Hrs  T(C): 36.4 (05 Jun 2020 10:30), Max: 37.5 (04 Jun 2020 15:39)  T(F): 97.5 (05 Jun 2020 10:30), Max: 99.5 (04 Jun 2020 15:39)  HR: 99 (05 Jun 2020 10:30) (99 - 108)  BP: 128/77 (05 Jun 2020 10:30) (128/77 - 134/81)  BP(mean): --  RR: 16 (05 Jun 2020 10:30) (16 - 18)  SpO2: 95% (05 Jun 2020 10:30) (93% - 95%)    Physical Exam:  General: NAD, A&Ox3  ENMT: right neck with firm nodules palpated  Respiratory: no SOB on room air  Gastrointestinal: soft NT/ND  Neurology: sensation grossly intact  Musculoskeletal: no contractures  Vascular: BLE edema equal, Right Upper extremity with + edema including hand, RUE equally warm, no acute ischemia noted  Skin:  Right breat with an all encompassing firm, nodular mass with superficial dry yellow crust centrally, firm nodules noted leading from right breast up through right neck, no active drainage, No odor, erythema, increased warmth, tenderness, fluctuance    LABS:  06-05    147<H>  |  109<H>  |  9   ----------------------------<  150<H>  3.6   |  27  |  0.54    Ca    9.0      05 Jun 2020 11:40    TPro  5.8<L>  /  Alb  2.8<L>  /  TBili  0.5  /  DBili  x   /  AST  57<H>  /  ALT  18  /  AlkPhos  115  06-04                          8.0    10.26 )-----------( 526      ( 05 Jun 2020 07:01 )             26.0     PT/INR - ( 03 Jun 2020 15:11 )   PT: 14.1 sec;   INR: 1.23 ratio         PTT - ( 05 Jun 2020 00:26 )  PTT:55.3 sec      RADIOLOGY & ADDITIONAL STUDIES:    EXAM:  DUPLEX EXT VEINS UPPER RT                        PROCEDURE DATE:  06/03/2020    INTERPRETATION:  CLINICAL INFORMATION: Right upper extremity swelling and pain.    COMPARISON: None available.    TECHNIQUE: Duplex sonography of the RIGHT UPPER extremity veins with color and spectral Doppler, with and without compression.      FINDINGS:  The right internal jugular, subclavian, and axillary veins are patent and compressible where applicable. Thrombosis of the right brachial vein. The basilic and cephalic veins (superficial veins) were not imaged.    Doppler examination shows normal spontaneous and phasic flow.    IMPRESSION:     Right brachial vein deep venous thrombosis.    EXAM:  CT ANGIO CHEST (W)AW IC                        PROCEDURE DATE:  06/03/2020    INTERPRETATION:  CLINICAL INFORMATION: Right upper extremity edema. Tachycardia and shortness of breath.    COMPARISON: CT chest 9/12/2019.    PROCEDURE:   CT Angiography of the Chest.  90 ml of Omnipaque 350 was injected intravenously. 10 ml were discarded.  Sagittal and coronal reformats were performed as well as 3D (MIP) reconstructions.    FINDINGS:    LUNGS AND AIRWAYS: Patent central airways.  New bilateral pulmonary nodules likely reflecting metastatic disease, with a reference 1.1 cm nodule in the left lower lobe (2:99). Interlobular septal thickening more prominent on the right, may reflect mild pulmonary edema however lymphangitic carcinomatosis can be considered. Right lower lobe passive atelectasis.  PLEURA: Small right pleural effusion.  MEDIASTINUM AND NATHANIEL: Extensive mediastinal and bilateral hilar lymphadenopathy. A prevascular node measures 2.6 x 1.8 cm.  VESSELS: No pulmonary embolism. Narrowing of the right main pulmonary artery secondary to external compression from mediastinal adenopathy.  HEART: Heart size is normal. No pericardial effusion.    CHEST WALL AND LOWER NECK: Nodular right breast and chest wall skin thickening with a nodular masslike area of soft tissue in the retroareolar region. Extensive inflammatory change in the right breast extending to the chest wall musculature with loss of the normal fat planes. There is a hyperattenuating nodular mass in the infrascapular region measuring up to 9.0 x 4.7 cm, with numerous satellite nodules. Bilateral axillary and supracervical lymphadenopathy. Left breast skin thickening.      VISUALIZED UPPER ABDOMEN: Numerous ill-defined hepatic lesions measuring up to 5.0 x 3.6 cm (2:111), likely reflecting metastases. In enlarged retroperitoneal lymph nodes, with a reference node measuring 2.6 x 2.1 cm. Nodular bilateral adrenal thickening.    BONES: Lytic lesions in the T2, L1 and L2 vertebral bodies    IMPRESSION:     No pulmonary embolism.    Nodular right breast skin thickening with underlying inflammation extending to the chest wall musculature consistent with recurrent invasive breast cancer. Widespread metastases above and below the diaphragm with lymphadenopathy, as described above.    Interlobular septal thickening more prominent on the right, may reflect mild pulmonary edema however lymphangitic carcinomatosis can be considered.    Small right pleural effusion.

## 2020-06-05 NOTE — PROGRESS NOTE ADULT - SUBJECTIVE AND OBJECTIVE BOX
HPI:  Patient is a 69 year old female with right breast cancer (diagnosed in 2018) s/p chemotherapy and radiation treatment ( last session about 6 months prior to admission),  anemia, MVP, presents for shortness of breath and right arm pain for the past month.  Patient reports progressive dyspnea on exertion over the past few weeks , and now experiences SOB with minimal exertion. She also notes pain and swelling in her right upper extremity during this time, and for the past week she reports occasional numbness and tingling in her fingers. She reports generalized weakness and decreased appetite. She reports diffuse pain most prominent in her back for which she took Aleve with minimal response.  She has no fever or chills. She reports no chest pain. No cough. She has no lightheadedness.  She reports no blood in stool, but did have dark soft stool , which she attributed to iron supplements. PE was ruled out; however, there is concern for lymphangitic spread. She was also found to have DVT of her RUE and Anemia.     Palliative Care was called for GOC and symptoms Rx.    6/4 Patient was seen and examined. She was lethargic and with tachypnea and mild labored breathing. Though alert when called; however, not able to keep up with a GOC discussion. In fact, she asked me to come back later. She did c/o pain over her RUE; however, she was not able to describe the characteristics of it. She was also c/o dyspnea. I tried to ask her about her family but she did not want to discuss about it at this time.     6/5 Today, she was alert and able to f/u a conversation. She was c/o pain over her R arm. She was also c/o mild dyspnea. She denied any other complaints.         PERTINENT PM/SXH:   Lymphedema  H/O hemorrhoids  Mitral valve prolapse  Anemia  Breast cancer    H/O inguinal hernia repair  H/O umbilical hernia repair  H/O hemorrhoidectomy    FAMILY HISTORY:  FH: hyperlipidemia: brother  FH: hypertension: mother, brother    ITEMS NOT CHECKED ARE NOT PRESENT    SOCIAL HISTORY:   Significant other/partner[ ]   Children[ ]  Mormon/Spirituality: Unknown   Substance hx:  [ ]   Tobacco hx:  [ ]   Alcohol hx: [ ]   Home Opioid hx:  [ ] I-Stop Reference No: Reference #: [id number]  Living Situation: [ ]Home  [ ]Long term care  [ ]Rehab [ ]Other  Unknowns living situation.         ADVANCE DIRECTIVES:    DNR  MOLST  [ ]  Living Will  [ ]   DECISION MAKER(s):  [ ] Health Care Proxy(s)  [x ] Surrogate(s)  [ ] Guardian           Name(s): Phone Number(s): ?     BASELINE (I)ADL(s) (prior to admission):  [place]: [ ]Total  [ ] Moderate [ ]Dependent. Unknown.     Allergies    No Known Allergies    Intolerances    MEDICATIONS  (STANDING):  acetaminophen   Tablet .. 650 milliGRAM(s) Oral every 6 hours  enoxaparin Injectable 70 milliGRAM(s) SubCutaneous every 12 hours  gabapentin 100 milliGRAM(s) Oral at bedtime  methylPREDNISolone sodium succinate Injectable 40 milliGRAM(s) IV Push daily  multivitamin 1 Tablet(s) Oral daily  pantoprazole  Injectable 40 milliGRAM(s) IV Push every 12 hours  senna 2 Tablet(s) Oral at bedtime    MEDICATIONS  (PRN):  morphine  - Injectable 1.5 milliGRAM(s) IV Push every 2 hours PRN Moderate to Severe pain  ondansetron Injectable 4 milliGRAM(s) IV Push every 6 hours PRN Nausea      PRESENT SYMPTOMS: [x]Unable to obtain due to poor mentation   Source if other than patient:  [ ]Family   [ ]Team     Pain: [x ]yes [ ]no  QOL impact - not able to indicate   Location -         RUE   Aggravating factors - not able to indicate   Quality - not able to indicate   Radiation - not able to indicate   Timing- not able to indicate   Severity (0-10 scale): not able to indicate   Minimal acceptable level (0-10 scale):  not able to indicate.     CPOT:    https://[email or web address]/getattachment/[email or web address]/Critical-Care-Pain-Observation-Tool-(CPOT)      PAIN AD Score: 1-2    [email or web address].[email or web address]/cog/painad.pdf (press ctrl +  left click to view)    Dyspnea:                           [ ]Mild [ ]Moderate [ ]Severe  Anxiety:                             [ ]Mild [ ]Moderate [ ]Severe  Fatigue:                             [ ]Mild [ ]Moderate [ ]Severe  Nausea:                             [ ]Mild [ ]Moderate [ ]Severe  Loss of appetite:              [ ]Mild [ ]Moderate [ ]Severe  Constipation:                    [ ]Mild [ ]Moderate [ ]Severe    Other Symptoms:  [ ]All other review of systems negative     Palliative Performance Status Version 2:         %    http://npcrc.org/files/news/palliative_performance_scale_ppsv2.pdf  PHYSICAL EXAM:  Vital Signs Last 24 Hrs  T(C): 36.4 (05 Jun 2020 10:30), Max: 36.5 (05 Jun 2020 01:25)  T(F): 97.5 (05 Jun 2020 10:30), Max: 97.7 (05 Jun 2020 01:25)  HR: 99 (05 Jun 2020 10:30) (99 - 104)  BP: 128/77 (05 Jun 2020 10:30) (128/77 - 134/80)  BP(mean): --  RR: 16 (05 Jun 2020 10:30) (16 - 18)  SpO2: 95% (05 Jun 2020 10:30) (94% - 95%)      GENERAL:  []Alert  [x]Oriented x 2-3 xLethargic  [ ]Cachexia  [ ]Unarousable  [x]Verbal  [ ]Non-Verbal  Behavioral:   [ ] Anxiety  [ ] Delirium [ ] Agitation [ ] Other  HEENT:  [ ]Normal   [x]Dry mouth   [ ]ET Tube/Trach  [ ]Oral lesions  PULMONARY:   [ ]Clear [x]Tachypnea  [ ]Audible excessive secretions   [ ]Rhonchi        [ ]Right [ ]Left [ ]Bilateral  [ ]Crackles        [ ]Right [ ]Left [ ]Bilateral  [ ]Wheezing     [ ]Right [ ]Left [ ]Bilateral  [x] labored breathing   [x]Diminished breath sounds [ ]right [ ]left [ xbilateral  CARDIOVASCULAR:    [x]Regular [ ]Irregular [ ]Tachy  [ ]Abner [ ]Murmur [ ]Other  GASTROINTESTINAL:  [x]Soft  [ ]Distended   [ ]+BS  [ ]Non tender [ ]Tender  [ ]PEG [ ]OGT/ NGT  Last BM:     GENITOURINARY:  [ ]Normal [ ] Incontinent   [ ]Oliguria/Anuria   [ ]Birch [x] Fungating mass over right breast   MUSCULOSKELETAL:   [ ]Normal   [ ]Weakness  [ ]Bed/Wheelchair bound [x]Edema RUE   NEUROLOGIC:   [ ]No focal deficits  [ ]Cognitive impairment  [ ]Dysphagia [ ]Dysarthria [ ]Paresis [ xOther: Lethargic   SKIN:   [ ]Normal    [ ]Rash  [ ]Pressure ulcer(s)       Present on admission [ ]y [ ]n [x] [x] Fungating mass over right breast     CRITICAL CARE:  [ ] Shock Present  [ ]Septic [ ]Cardiogenic [ ]Neurologic [ ]Hypovolemic  [ ]  Vasopressors [ ]  Inotropes   [ ]Respiratory failure present [ ]Mechanical ventilation [ ]Non-invasive ventilatory support [ ]High flow    [ ]Acute  [ ]Chronic [ ]Hypoxic  [ ]Hypercarbic [ ]Other  [ ]Other organ failure     LABS:                                       8.0    10.26 )-----------( 526      ( 05 Jun 2020 07:01 )             26.0   06-05    147<H>  |  109<H>  |  9   ----------------------------<  150<H>  3.6   |  27  |  0.54    Ca    9.0      05 Jun 2020 11:40    TPro  5.8<L>  /  Alb  2.8<L>  /  TBili  0.5  /  DBili  x   /  AST  57<H>  /  ALT  18  /  AlkPhos  115  06-04      RADIOLOGY & ADDITIONAL STUDIES:  < from: CT Angio Chest w/ IV Cont (06.03.20 @ 16:24) >    EXAM:  CT ANGIO CHEST (W)AW IC                            PROCEDURE DATE:  06/03/2020  IMPRESSION:     No pulmonary embolism.    Nodular right breast skin thickening with underlying inflammation extending to the chest wall musculature consistent with recurrent invasive breast cancer. Widespread metastases above and below the diaphragm with lymphadenopathy, as described above.    Interlobular septal thickening more prominent on the right, may reflect mild pulmonary edema however lymphangitic carcinomatosis can be considered.    Small right pleural effusion.                JANET ALVARADO M.D., RADIOLOGY RESIDENT  This document has been electronically signed.  NOMI CMKEON M.D., ATTENDING RADIOLOGIST  This document has been electronically signed. Omer  3 2020  5:05PM        < end of copied text >    PROTEIN CALORIE MALNUTRITION PRESENT: [ ]mild [ ]moderate [ ]severe [ ]underweight [ ]morbid obesity  https://www.andeal.org/vault/2440/web/files/ONC/Table_Clinical%20Characteristics%20to%20Document%20Malnutrition-White%20JV%20et%20al%202012.pdf    Height (cm): 167.6 (06-03-20 @ 20:28), 167.6 (09-17-19 @ 09:00)  Weight (kg): 74.2 (06-03-20 @ 20:28), 71.9 (09-17-19 @ 09:00)  BMI (kg/m2): 26.4 (06-03-20 @ 20:28), 25.6 (09-17-19 @ 09:00)    [ ]PPSV2 < or = to 30% [ ]significant weight loss  [ ]poor nutritional intake  [ ]anasarca     Albumin, Serum: 2.8 g/dL (06-04-20 @ 05:03)   [ ]Artificial Nutrition      REFERRALS:   [ ]Chaplaincy  [ ]Hospice  [ ]Child Life  [ ]Social Work  [ ]Case management [ ]Holistic Therapy     Goals of Care Document:

## 2020-06-05 NOTE — PROGRESS NOTE ADULT - PROBLEM SELECTOR PLAN 3
-hgb 6.7 , reported dark stools s/p 1 U pRBCs transfusion  -H&H stable   -continue with PPI 40mg daily  -underwent EGD today, f/u results

## 2020-06-05 NOTE — PROGRESS NOTE ADULT - PROBLEM SELECTOR PLAN 1
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 Also has small R pleural effusion  -etiology likely multifactorial   -appreciate pulm recs, will start on solumedrol 40mg IV daily for lymphagitic spread  -O2 as needed

## 2020-06-05 NOTE — PROGRESS NOTE ADULT - PROBLEM SELECTOR PLAN 1
Will increase Morphine IV to 1.5 mg q 2 PRN   Will add Gabapentin 100 mg hs   Continue Acetaminophen ATC x 1 more day.

## 2020-06-05 NOTE — CONSULT NOTE ADULT - ASSESSMENT
Impression:    Right breast fungating mass  Radiation dermatitis  Right Upper Extremity lymphedema    Recommend:  1.) Topical therapy: right breast - cleanse gently with NS, pat dry, apply adaptic every 3 days and PRN if soiled, add aquacel if drainage begins  2.) RUE elevation  3.) Onc follow up    Care as per medicine. Will not follow. Please recall for new issuses.  Upon [facility] [address] [place] [address] [phone]  Seen with Dr. Lomas and discussed with clinical nurse  Thank you for this consult  Cecile Renteria, NP-C, CWOCN [id number] Impression:    Right breast fungating mass  Radiation dermatitis  Right Upper Extremity lymphedema    Recommend:  1.) Topical therapy: right breast - cleanse gently with NS, pat dry, apply adaptic every 3 days and PRN if soiled, add aquacel if drainage begins but continue adaptic  2.) RUE elevation  3.) Onc follow up    Care as per medicine. Will not follow. Please recall for new issuses.  Upon discharge f/[facility] at [facility] [address] [place] [address] [phone]  Seen with Dr. Lomas and discussed with clinical nurse  Thank you for this consult  Cecile Renteria, NP-C, CWOCN [id number]

## 2020-06-05 NOTE — CONSULT NOTE ADULT - REASON FOR ADMISSION
SOB and RUE swelling x one month

## 2020-06-05 NOTE — CONSULT NOTE ADULT - ASSESSMENT
69F w/ R  breast cancer (diagnosed in 2018; was receiving regional chemo perfusion / embolization + radiation), anemia, MVP, p/w SOB and RUE swelling, found to have RUE DVT in the setting of progression of metastatic triple negative breast cancer. Managed by alternative therapies as per patient's discretion, s/p palliative RT to the R breast with Dr. Nathaniel Orr. Has not followed up for systemic chemotherapy.    #Triple negative, metastatic breast cancer  -triple negative invasive ductal carcinoma per pathology from 2018 bx seen in [employer]  -progression of disease, with mets as above  -dyspnea in setting of small pleural effusion, and pulmonary edema vs lymphangitic spread (suspect the latter given absence of lower extremity edema)  -discussed with patient that systemic chemotherapy may potentially assist with symptoms by decreasing tumor size, but would not pursue inpatient chemotherapy  -DVT but without PE; agree with lovenox, monitor for bleeding in setting of positive occult blood  -Pain control per primary team; if dyspnea and pain improved / controlled, recommended that she follow up as an outpatient with oncology to discuss further treatment options  -House Oncology was consulted by primary team given hx of breast cancer; after patient was seen, primary team reported that patient's outpatient oncology group would see patient this admission (oncology note reviewed, appreciate assistance with coordination of care)    House oncology will sign off for now, patient to follow up with Dr. Barnes as an outpatient.    Mark Garcia MD, MPH  Hematology / Oncology Fellow, PGY5  p

## 2020-06-05 NOTE — PROGRESS NOTE ADULT - SUBJECTIVE AND OBJECTIVE BOX
Patient is a 69y old  Female who presents with a chief complaint of SOB and RUE swelling x one month (05 Jun 2020 11:49)      SUBJECTIVE / OVERNIGHT EVENTS: no acute events overnight, patient reports no dyspnea at rest. Continues to have all over body pain.     MEDICATIONS  (STANDING):  acetaminophen   Tablet .. 650 milliGRAM(s) Oral every 6 hours  methylPREDNISolone sodium succinate Injectable 40 milliGRAM(s) IV Push daily  multivitamin 1 Tablet(s) Oral daily  pantoprazole  Injectable 40 milliGRAM(s) IV Push every 12 hours  senna 2 Tablet(s) Oral at bedtime    MEDICATIONS  (PRN):  morphine  - Injectable 1 milliGRAM(s) IV Push every 4 hours PRN Moderate to Severe pain  ondansetron Injectable 4 milliGRAM(s) IV Push every 6 hours PRN Nausea      Vital Signs Last 24 Hrs  T(C): 36.4 (05 Jun 2020 10:30), Max: 37.5 (04 Jun 2020 15:39)  T(F): 97.5 (05 Jun 2020 10:30), Max: 99.5 (04 Jun 2020 15:39)  HR: 99 (05 Jun 2020 10:30) (99 - 108)  BP: 128/77 (05 Jun 2020 10:30) (128/77 - 134/81)  BP(mean): --  RR: 16 (05 Jun 2020 10:30) (16 - 18)  SpO2: 95% (05 Jun 2020 10:30) (93% - 95%)  CAPILLARY BLOOD GLUCOSE        I&O's Summary    04 Jun 2020 07:01  -  05 Jun 2020 07:00  --------------------------------------------------------  IN: 831 mL / OUT: 0 mL / NET: 831 mL      PHYSICAL EXAM:  GENERAL: NAD  EYES:  conjunctiva and sclera clear  NECK: Supple, No JVD  CHEST/LUNG: Clear to auscultation bilaterally; No wheeze  HEART: +S1/S2, reg   ABDOMEN: Soft, Nontender, Nondistended  EXTREMITIES: +RUE edema noted. No LE edema   PSYCH: AAOx3  NEUROLOGY: Strength 4/5 in the LE b/l   SKIN: extensive skin changes with exudates on R chest wall     LABS:                        8.0    10.26 )-----------( 526      ( 05 Jun 2020 07:01 )             26.0     06-04    139  |  102  |  9   ----------------------------<  104<H>  3.7   |  24  |  0.56    Ca    9.0      04 Jun 2020 05:03    TPro  5.8<L>  /  Alb  2.8<L>  /  TBili  0.5  /  DBili  x   /  AST  57<H>  /  ALT  18  /  AlkPhos  115  06-04    PT/INR - ( 03 Jun 2020 15:11 )   PT: 14.1 sec;   INR: 1.23 ratio         PTT - ( 05 Jun 2020 00:26 )  PTT:55.3 sec

## 2020-06-05 NOTE — PROGRESS NOTE ADULT - SUBJECTIVE AND OBJECTIVE BOX
Pre-Endoscopy Evaluation      Referring Physician: dr. janae siu                                 Procedure:  upper gastrointestinal endoscopy/colonoscopy    Indication for Procedure: anemia, gib    Pertinent History:  69 year old female with PMH right breast cancer (diagnosed in 2018) s/p regional chemo perfusion/embolization 09/2019 and radiation treatment ( last session about 6 months prior to admission),  anemia, MVP, presents for shortness of breath and right arm pain for the past month.    She was found to have right brachial vein deep venous thrombosis.   clearance to start anticoagulation given anemia with reported black stool      PAST MEDICAL & SURGICAL HISTORY:  Lymphedema: rt arm  H/O hemorrhoids  Mitral valve prolapse  Anemia  Breast cancer  H/O inguinal hernia repair  H/O umbilical hernia repair  H/O hemorrhoidectomy      PMH of Gastroparesis [ ]  Gastric Surgery [ ]  Gastric Outlet Obstruction [ ]    Allergies:    No Known Allergies    Intolerances:    Latex allergy: [ ] yes [x] no    Medications:MEDICATIONS  (STANDING):  acetaminophen   Tablet .. 650 milliGRAM(s) Oral every 6 hours  methylPREDNISolone sodium succinate Injectable 40 milliGRAM(s) IV Push daily  multivitamin 1 Tablet(s) Oral daily  pantoprazole  Injectable 40 milliGRAM(s) IV Push every 12 hours  senna 2 Tablet(s) Oral at bedtime    MEDICATIONS  (PRN):  morphine  - Injectable 1 milliGRAM(s) IV Push every 4 hours PRN Moderate to Severe pain  ondansetron Injectable 4 milliGRAM(s) IV Push every 6 hours PRN Nausea      Smoking: [ ] yes  [X] no    AICD/PPM: [ ] yes   [X] no    Pertinent lab data:                        8.0    10.26 )-----------( 526      ( 05 Jun 2020 07:01 )             26.0     06-04    139  |  102  |  9   ----------------------------<  104<H>  3.7   |  24  |  0.56    Ca    9.0      04 Jun 2020 05:03    TPro  5.8<L>  /  Alb  2.8<L>  /  TBili  0.5  /  DBili  x   /  AST  57<H>  /  ALT  18  /  AlkPhos  115  06-04    PT/INR - ( 03 Jun 2020 15:11 )   PT: 14.1 sec;   INR: 1.23 ratio      PTT - ( 05 Jun 2020 00:26 )  PTT:55.3 sec      COVID-19 PCR: NotDetec (03 Jun 2020 16:57)      Physical Examination:       Daily   Vital Signs Last 24 Hrs  T(C): 36.5 (05 Jun 2020 01:25), Max: 37.5 (04 Jun 2020 15:39)  T(F): 97.7 (05 Jun 2020 01:25), Max: 99.5 (04 Jun 2020 15:39)  HR: 104 (05 Jun 2020 01:25) (104 - 108)  BP: 134/80 (05 Jun 2020 01:25) (108/61 - 134/81)  BP(mean): --  RR: 18 (05 Jun 2020 01:25) (18 - 18)  SpO2: 94% (05 Jun 2020 01:25) (92% - 94%)    Drug Dosing Weight  Height (cm): 167.6 (03 Jun 2020 20:28)  Weight (kg): 74.2 (03 Jun 2020 20:28)  BMI (kg/m2): 26.4 (03 Jun 2020 20:28)  BSA (m2): 1.84 (03 Jun 2020 20:28)    Constitutional: NAD     Neck:  No JVD    Respiratory: CTAB/L    Cardiovascular: S1 and S2    Gastrointestinal: BS+, soft, NT/ND    Extremities: No peripheral edema    Neurological: A/O x 3    : No Birch    Skin: No rashes    Comments: heparin infusion paused    The patient is a suitable candidate for the planned procedure unless box checked [ ]  No, explain:

## 2020-06-06 NOTE — PROGRESS NOTE ADULT - PROBLEM SELECTOR PLAN 4
patient had HCP form at bedside, with what appeared to be an attempt to fill out but mainly scribbles.   Discussed about form again and she is undecided, wants to speak with family.  offered to follow up tomorrow 6/7 and assist if needed

## 2020-06-06 NOTE — PHYSICAL THERAPY INITIAL EVALUATION ADULT - PERTINENT HX OF CURRENT PROBLEM, REHAB EVAL
69 year old female with PMH right breast cancer (diagnosed in 2018) s/p chemotherapy and radiation treatment ( last session about 6 months prior to admission),  anemia, MVP, presents for shortness of breath and right arm pain for the past month. 69 year old female with PMH right breast cancer (diagnosed in 2018) s/p chemotherapy and radiation treatment ( last session about 6 months prior to admission),  anemia, MVP, presents for shortness of breath and right arm pain for the past month. CT Angio- no e/o PE.

## 2020-06-06 NOTE — DISCHARGE NOTE PROVIDER - NSDCCPCAREPLAN_GEN_ALL_CORE_FT
PRINCIPAL DISCHARGE DIAGNOSIS  Diagnosis: Shortness of breath  Assessment and Plan of Treatment:       SECONDARY DISCHARGE DIAGNOSES  Diagnosis: Pain  Assessment and Plan of Treatment: Pain    Diagnosis: Anemia  Assessment and Plan of Treatment: Anemia    Diagnosis: Stage IV breast cancer in female  Assessment and Plan of Treatment: Stage IV breast cancer in female    Diagnosis: Deep vein thrombosis (DVT) of other vein of right upper extremity  Assessment and Plan of Treatment: Deep vein thrombosis (DVT) of other vein of right upper extremity    Diagnosis: Anemia  Assessment and Plan of Treatment:     Diagnosis: Pulmonary edema  Assessment and Plan of Treatment:

## 2020-06-06 NOTE — PROGRESS NOTE ADULT - PROBLEM SELECTOR PLAN 5
remain available for pain management. recommendations as above  [phone] if acute issues or intractable symptoms arise

## 2020-06-06 NOTE — PROGRESS NOTE ADULT - SUBJECTIVE AND OBJECTIVE BOX
Patient is a 69y old  Female who presents with a chief complaint of SOB and RUE swelling x one month (05 Jun 2020 18:28)      SUBJECTIVE / OVERNIGHT EVENTS: no new events, sleeping but arousable this am    MEDICATIONS  (STANDING):  acetaminophen   Tablet .. 650 milliGRAM(s) Oral every 6 hours  enoxaparin Injectable 70 milliGRAM(s) SubCutaneous every 12 hours  FIRST- Mouthwash  BLM 5 milliLiter(s) Swish and Spit four times a day  gabapentin 100 milliGRAM(s) Oral at bedtime  methylPREDNISolone sodium succinate Injectable 40 milliGRAM(s) IV Push daily  multivitamin 1 Tablet(s) Oral daily  pantoprazole  Injectable 40 milliGRAM(s) IV Push every 12 hours  senna 2 Tablet(s) Oral at bedtime    MEDICATIONS  (PRN):  morphine  - Injectable 1.5 milliGRAM(s) IV Push every 2 hours PRN Moderate to Severe pain  ondansetron Injectable 4 milliGRAM(s) IV Push every 6 hours PRN Nausea      T(C): 36.9 (06-06-20 @ 07:35), Max: 36.9 (06-05-20 @ 23:30)  HR: 75 (06-06-20 @ 09:24) (75 - 110)  BP: 129/73 (06-06-20 @ 09:24) (120/74 - 131/79)  RR: 18 (06-06-20 @ 09:24) (17 - 18)  SpO2: 96% (06-06-20 @ 09:24) (95% - 97%)  CAPILLARY BLOOD GLUCOSE        I&O's Summary      PHYSICAL EXAM:  GENERAL: NAD  HEAD:  Atraumatic, Normocephalic  EYES: EOMI, PERRLA, conjunctiva and sclera clear  NECK: Supple, No JVD  CHEST/LUNG: Clear to auscultation bilaterally; No wheeze, right sided breast mass bandaged, dressing clean  HEART: Regular rate and rhythm; No murmurs, rubs, or gallops  ABDOMEN: Soft, Nontender, Nondistended; Bowel sounds present  EXTREMITIES:  right UE edema    LABS:                        8.2    13.24 )-----------( 562      ( 06 Jun 2020 06:19 )             27.1     06-05    147<H>  |  109<H>  |  9   ----------------------------<  150<H>  3.6   |  27  |  0.54    Ca    9.0      05 Jun 2020 11:40      PTT - ( 05 Jun 2020 00:26 )  PTT:55.3 sec          RADIOLOGY & ADDITIONAL TESTS:    Imaging Personally Reviewed:    Consultant(s) Notes Reviewed:      Care Discussed with Consultants/Other Providers:

## 2020-06-06 NOTE — DISCHARGE NOTE PROVIDER - CARE PROVIDER_API CALL
Quintin Barnes  INTERNAL MEDICINE  [address] [place] [address], [place] [facility]  Phone: [phone]  Fax: [phone]  Established Patient  Follow Up Time:

## 2020-06-06 NOTE — PROGRESS NOTE ADULT - PROBLEM SELECTOR PLAN 1
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 Also has small R pleural effusion  -etiology likely multifactorial   -appreciate pulm recs, continue solumedrol 40mg IV daily for lymphagitic spread  -O2 as needed

## 2020-06-06 NOTE — PROGRESS NOTE ADULT - PROBLEM SELECTOR PLAN 3
received x3 doses of IV morphine over last 24 hours, rating pain 8/10 but reluctant to ask for more medication.   continue current regimen  tylenol ATC at current time  gabapentin HS

## 2020-06-06 NOTE — DISCHARGE NOTE PROVIDER - HOSPITAL COURSE
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 Patient tried alternative therapy as per patient's discretion. Later on had radiation therapy . No PE on CTA    but shows multiple nodules, and concern for lymphatic involvement, likely contributing symptoms    as well as anemia. Etiology likely multifactorial. Seen by pulmonary, treated with solumedrol, now improved. Had oncology consult, needs to have out patient work up including PET scan, and to    f/u with her oncologist. EGD/Colonoscopy done on 6/5 was negative for active bleeding. S/P 1 blood transfusion. Anemia stable. On Lovenox for DVT

## 2020-06-06 NOTE — PROGRESS NOTE ADULT - PROBLEM SELECTOR PLAN 3
-hgb 6.7 , reported dark stools s/p 1 U pRBCs transfusion  -H&H stable   -continue with PPI 40mg daily -hgb 6.7 , reported dark stools s/p 1 U pRBCs transfusion  -H&H stable   -continue with PPI 40mg daily  -EGD / colon negative for active bleed

## 2020-06-06 NOTE — PHYSICAL THERAPY INITIAL EVALUATION ADULT - ASSISTIVE DEVICE FOR TRANSFER: SIT/STAND, REHAB EVAL
Spoke with Mayelin Witt RN to let her know that pt has a MRI Unsafe pacemaker and can not have MRI rolling walker

## 2020-06-06 NOTE — DISCHARGE NOTE PROVIDER - NSDCMRMEDTOKEN_GEN_ALL_CORE_FT
Multiple Vitamins oral capsule: 1 cap(s) orally once a day  Vitamin B Complex 100: tab(s) orally gabapentin 100 mg oral capsule: 1 cap(s) orally 3 times a day  modafinil 100 mg oral tablet: 1 tab(s) orally once a day  Multiple Vitamins oral capsule: 1 cap(s) orally once a day  Vitamin B Complex 100: tab(s) orally diphenhydramine/lidocaine/aluminum hydroxide/magnesium hydroxide/simethicone mucous membrane suspension:  mucous membrane   enoxaparin:   gabapentin 100 mg oral capsule: 1 cap(s) orally 3 times a day  modafinil 100 mg oral tablet: 1 tab(s) orally once a day  Multiple Vitamins oral capsule: 1 cap(s) orally once a day  oxyCODONE 5 mg oral tablet: 1 tab(s) orally every 4 hours, As needed, Moderate Pain (4 - 6)  pantoprazole 40 mg oral delayed release tablet: 1 tab(s) orally once a day (before a meal)  prednisoLONE (as sodium phosphate) 15 mg/5 mL oral liquid: 16.67 milliliter(s) orally once a day  senna oral tablet: 2 tab(s) orally once a day (at bedtime)  Vitamin B Complex 100: tab(s) orally

## 2020-06-06 NOTE — PHYSICAL THERAPY INITIAL EVALUATION ADULT - PLANNED THERAPY INTERVENTIONS, PT EVAL
bed mobility training/transfer training/strengthening/GOAL: Pt will negotiate 5 steps  up and down using HR support, independent  within 2-3 weeks./gait training/balance training

## 2020-06-06 NOTE — DISCHARGE NOTE PROVIDER - PROVIDER RX CONTACT NUMBER
CARDIOVASCULAR VISITT NOTE    Patient: Sukh Moran Date: 8/8/2017   YOB: 1945    72 year old male      CHIEF COMPLAINT:   Chief Complaint   Patient presents with   • Follow-up       HISTORY OF PRESENT ILLNESS:  Referred by Kasia Lira MD    Sukh Moran is 72 year old male presenting for consult, referred by PCP for monitoring of BP and ascending thoracic aortic aneurysm 4.4 cm and abdominal aortic aneurysm of 3 cm. PMHx of CAD with prior stent ~10 years ago, HTN, COPD with h/o lifelong tobacco abuse (at least 1ppd since age 20), and long-standing alcohol use.    At clinic visit today, Pt complains of exertional SOB that occurs only when he is carrying an object, but he states that he otherwise can walk \"[employer]\" without symptoms. He does endorse regular NSAID use. Pt denies chest pain, palpitations, BLE edema, dizziness and syncope. No bleeding or new focal weaknesses.     Past Medical History:   Diagnosis Date   • Aortic aneurysm (CMS/HCC) 03/2017    ascending thoracic and abdominal   • CAD (coronary artery disease)    • Chronic airway obstruction (CMS/HCC)    • Hernia    • Smoking        Past Surgical History:   Procedure Laterality Date   • APPENDECTOMY     • LUNG LOBECTOMY  2014    mini thoractomy-right upper wedge or lobectomy       ALLERGIES:  No Known Allergies    Current Medications    ASPIRIN 81 MG EC TABLET    Take 1 tablet by mouth daily.    ATENOLOL (TENORMIN) 50 MG TABLET    Take 1 tablet by mouth daily.    ATORVASTATIN (LIPITOR) 40 MG TABLET    Take 1 tablet by mouth nightly.    HYDROCHLOROTHIAZIDE (HYDRODIURIL) 25 MG TABLET    Take 1 tablet by mouth daily. Needs labs    LISINOPRIL (ZESTRIL) 20 MG TABLET    Take 1 tablet by mouth daily.    PANTOPRAZOLE (PROTONIX) 40 MG TABLET    Take 1 tablet by mouth nightly.    TIOTROPIUM (SPIRIVA HANDIHALER) 18 MCG CAPSULE FOR INHALER    Place 1 capsule into inhaler and inhale daily.       History   Smoking Status   • Former  Smoker   • Packs/day: 1.00   • Years: 51.00   • Types: Cigarettes   • Quit date: 3/7/2017   Smokeless Tobacco   • Never Used     History   Alcohol use Not on file       Family History   Problem Relation Age of Onset   • Heart disease Mother    • Heart disease Father    • High blood pressure Sister        REVIEW OF SYSTEMS:  Constitutional: No fever or chills, no change in weight.   Cardiovascular: Physical activities NYHA class I. No chest pain, lower extremity edema, palpitations or syncope/presyncope.  Respiratory: No shortness of breath, cough sputum or hemoptysis.  Skin: No rash or color change. No itching.  Eyes: No visual disturbance or diplopia  Ears/Nose/Throat: No nose bleeds, dysphagia or slurred speech.  Gastrointestinal: No abdominal pain, nausea or vomiting. No melena, hematochezia or hematemesis.  Genitourinary: No hematuria, no incontinence.  Musculoskeletal: No myalgia, arthralgias or joint swelling.  Neurologic: No loss of consciousness, no weakness or numbness.  Psychiatric: Denies depression, anxiety. No confusion/agitation.  Endocrine: No polyuria, polydipsia, heat or cold intolerance.    PHYSICAL EXAM:   Visit Vitals  /78   Pulse 80   Ht 6' (1.829 m)   Wt 72.1 kg   SpO2 97%   BMI 21.56 kg/m²     CONST:Comfortable, no acute distress.  HEENT: Normocephalic, conjunctivae not pale, no scleral icterus. Moist mucous membranes. Pupils symmetrical.  NECK: No jugular venous distension, trachea midline.   CHEST and RESPIRATORY: Symmetrical lung expansions. Normal respiratory effort. Lungs clear to ascultation bilaterally.  CARDIOVASCULAR: PMI not displaced. No precordial thrill. S1 and S2 normal intensity, regular rate and rhythm. No murmur/rubs. No S3, S4.  Carotid pulses brisk bilaterally, with no bruit.  Pulses symmetrical 2+ in radial, femoral and pedals. No edema in bilateral lower extremity.  ABDOMEN: Soft, nontender. Normoactive bowel sounds.  EXTREMITIES: Warm, well perfused extremities. No  visible varicose veins, ulcer or gangrene.  NEUROLOGIC: Alert and oriented x3. Cranial nerves 2-12 intact grossly. Moves all 4 extremities equally.  SKIN: Not diaphoretic, warm. No bruising, no new rash.    INVESTIGATIONS:  Lab Results   Component Value Date    SODIUM 143 06/21/2017    POTASSIUM 5.2 (H) 07/05/2017    BUN 13 06/21/2017    CREATININE 1.02 06/21/2017    WBC 8.2 06/21/2017    HCT 38.5 (L) 06/21/2017    HGB 12.6 (L) 06/21/2017    RAPDTR <0.02 03/06/2017    GLUCOSE 91 06/21/2017    TSH 0.555 03/05/2017    CHOLESTEROL 115 06/21/2017    HDL 50 (L) 06/21/2017    CALCLDL 46 06/21/2017    TRIGLYCERIDE 97 06/21/2017    MG 2.0 03/06/2017       Electrocardiogram (personal interpretation):   ECG 3/5/17  Normal sinus rhythm   Incomplete right bundle branch block   Nonspecific T wave abnormality   Prolonged QT     Imaging:  Stress ECHO 3/5/17  No echocardiographic evidence of myocardial ischemia.    US Abdominal Aorta 3/17/17  IMPRESSION:  Infrarenal abdominal aortic dilatation measuring up to 3.0 cm.  Positive - Infrarenal abdominal aorta aneurysm greater than or equal to 3  cm in diameter or greater than or equal to 1.5 times the diameter of the  more proximal infrarenal aorta.    CT Chest 6/13/17  IMPRESSION:    1. Severe centrilobular emphysema.  2. Stable bandlike parenchymal opacities in the left upper lobe and  superior segment of the right lower lobe, as described. Current examination  demonstrating 3 months of stability. Given configuration, findings may be  related to areas of parenchymal scarring. Nevertheless, continued follow-up  or PET/CT could be considered for further workup.    ASSESSMENT AND PLAN:  Sukh was seen today for follow-up.    Diagnoses and all orders for this visit:    Thoracic aortic aneurysm without rupture (CMS/HCC)  Comments:  4.4 cm aneurysm of ascending aorta  F/U CTA in 11 mo, cont meds for aggressive BP & HR control, lipids, quit smoking  Orders:  -     CT ANGIOGRAM THORACIC  AORTA; Future    Aneurysm of infrarenal abdominal aorta (CMS/HCC)  Comments:  3.0 cm, will monitor with f/u CTA in 11 mo, medically manage HTN    Atherosclerosis of native coronary artery of native heart with angina pectoris (CMS/HCC)  Comments:  no chest pain, stable ROCHA  s/p remote stent, continue on  atenolol, atorvastatin, ASA    Essential hypertension  Comments:  stable, medically manage  continue on HCTZ, lisinopril, atenolol    Smoker  Comments:  current every day smoker, recommend cessation      Return in about 1 year (around 8/8/2018).     On 8/8/2017, I, Taiwo Rodríguez, RN scribed the services personally performed by Kamila Machado MD  _______________________________________________  I , personally performed this encounter, interviewed the patient, examined him and formulated management plan. Findings documented are my own and note was scribed under my direction. I have explained and discussed in detail with the patient who has expressed agreement with this plan.    Kamila Machado MD    cc: Kasia Lira MD   [phone]

## 2020-06-06 NOTE — PROGRESS NOTE ADULT - PROBLEM SELECTOR PLAN 2
improving compared to exam yesterday  she is awake and talkative, drowsy at times but able to answer questions appropriately

## 2020-06-06 NOTE — PHYSICAL THERAPY INITIAL EVALUATION ADULT - TRANSFER TRAINING, PT EVAL
GOAL: Pt will perform sit to/from stand transfers independently with least restrictive AD within 2-3 weeks.

## 2020-06-06 NOTE — PROGRESS NOTE ADULT - SUBJECTIVE AND OBJECTIVE BOX
SUBJECTIVE AND OBJECTIVE:  INTERVAL HPI/OVERNIGHT EVENTS:    DNR on chart:   Allergies    No Known Allergies    Intolerances    MEDICATIONS  (STANDING):  enoxaparin Injectable 70 milliGRAM(s) SubCutaneous every 12 hours  FIRST- Mouthwash  BLM 5 milliLiter(s) Swish and Spit four times a day  gabapentin 100 milliGRAM(s) Oral at bedtime  methylPREDNISolone sodium succinate Injectable 40 milliGRAM(s) IV Push daily  multivitamin 1 Tablet(s) Oral daily  pantoprazole  Injectable 40 milliGRAM(s) IV Push every 12 hours  senna 2 Tablet(s) Oral at bedtime    MEDICATIONS  (PRN):  morphine  - Injectable 1.5 milliGRAM(s) IV Push every 2 hours PRN Moderate to Severe pain  ondansetron Injectable 4 milliGRAM(s) IV Push every 6 hours PRN Nausea      ITEMS UNCHECKED ARE NOT PRESENT    PRESENT SYMPTOMS: [ ]Unable to obtain due to poor mentation   Source if other than patient:  [ ]Family   [ ]Team     Pain:  [ ]yes [ ]no  QOL impact -   Location -                    Aggravating factors -  Quality -  Radiation -  Timing-  Severity (0-10 scale):  Minimal acceptable level (0-10 scale):     Dyspnea:                           [ ]Mild [ ]Moderate [ ]Severe  Anxiety:                             [ ]Mild [ ]Moderate [ ]Severe  Fatigue:                             [ ]Mild [ ]Moderate [ ]Severe  Nausea:                             [ ]Mild [ ]Moderate [ ]Severe  Loss of appetite:              [ ]Mild [ ]Moderate [ ]Severe  Constipation:                    [ ]Mild [ ]Moderate [ ]Severe    PAIN AD Score:	  [email or web address]/painad.pdf (Ctrl + left click to view)    Other Symptoms:  [ ]All other review of systems negative     Palliative Performance Status Version 2:         %      http://npcrc.org/files/news/palliative_performance_scale_ppsv2.pdf  PHYSICAL EXAM:  Vital Signs Last 24 Hrs  T(C): 36.9 (06 Jun 2020 07:35), Max: 36.9 (05 Jun 2020 23:30)  T(F): 98.4 (06 Jun 2020 07:35), Max: 98.5 (05 Jun 2020 23:30)  HR: 75 (06 Jun 2020 09:24) (75 - 110)  BP: 129/73 (06 Jun 2020 09:24) (120/74 - 131/79)  BP(mean): --  RR: 18 (06 Jun 2020 09:24) (17 - 18)  SpO2: 96% (06 Jun 2020 09:24) (95% - 97%) I&O's Summary     GENERAL:  [ ]Alert  [ ]Oriented x   [ ]Lethargic  [ ]Cachexia  [ ]Unarousable  [ ]Verbal  [ ]Non-Verbal  Behavioral:   [ ]Anxiety  [ ]Delirium [ ]Agitation [ ]Other  HEENT:  [ ]Normal   [ ]Dry mouth   [ ]ET Tube/Trach  [ ]Oral lesions  PULMONARY:   [ ]Clear [ ]Tachypnea  [ ]Audible excessive secretions   [ ]Rhonchi        [ ]Right [ ]Left [ ]Bilateral  [ ]Crackles        [ ]Right [ ]Left [ ]Bilateral  [ ]Wheezing     [ ]Right [ ]Left [ ]Bilateral  [ ]Diminished BS [ ] Right [ ]Left [ ]Bilateral  CARDIOVASCULAR:    [ ]Regular [ ]Irregular [ ]Tachy  [ ]Abner [ ]Murmur [ ]Other  GASTROINTESTINAL:  [ ]Soft  [ ]Distended   [ ]+BS  [ ]Non tender [ ]Tender  [ ]PEG [ ]OGT/ NGT   Last BM:      GENITOURINARY:  [ ]Normal [ ]Incontinent   [ ]Oliguria/Anuria   [ ]Birch  MUSCULOSKELETAL:   [ ]Normal   [ ]Weakness  [ ]Bed/Wheelchair bound [ ]Edema  NEUROLOGIC:   [ ]No focal deficits  [ ] Cognitive impairment  [ ] Dysphagia [ ]Dysarthria [ ] Paresis [ ]Other   SKIN:   [ ]Normal  [ ]Rash   [ ]Pressure ulcer(s) [ ]y [ ]n present on admission    CRITICAL CARE:  [ ]Shock Present  [ ]Septic [ ]Cardiogenic [ ]Neurologic [ ]Hypovolemic  [ ]Vasopressors [ ]Inotropes  [ ]Respiratory failure present [ ]Mechanical Ventilation [ ]Non-invasive ventilatory support [ ]High-Flow  [ ]Acute  [ ]Chronic [ ]Hypoxic  [ ]Hypercarbic [ ]Other  [ ]Other organ failure     LABS:                        8.2    13.24 )-----------( 562      ( 06 Jun 2020 06:19 )             27.1   06-05    147<H>  |  109<H>  |  9   ----------------------------<  150<H>  3.6   |  27  |  0.54    Ca    9.0      05 Jun 2020 11:40    PTT - ( 05 Jun 2020 00:26 )  PTT:55.3 sec      RADIOLOGY & ADDITIONAL STUDIES:    Protein Calorie Malnutrition Present: [ ]mild [ ]moderate [ ]severe [ ]underweight [ ]morbid obesity  https://www.andeal.org/vault/2440/web/files/ONC/Table_Clinical%20Characteristics%20to%20Document%20Malnutrition-White%20JV%20et%20al%589503.pdf    Height (cm): 167.6 (06-03-20 @ 20:28), 167.6 (09-17-19 @ 09:00)  Weight (kg): 74.2 (06-03-20 @ 20:28), 71.9 (09-17-19 @ 09:00)  BMI (kg/m2): 26.4 (06-03-20 @ 20:28), 25.6 (09-17-19 @ 09:00)    [ ]PPSV2 < or = 30%  [ ]significant weight loss [ ]poor nutritional intake [ ]anasarca   Albumin, Serum: 2.8 g/dL (06-04-20 @ 05:03)   [ ]Artificial Nutrition    REFERRALS:   [ ]Chaplaincy  [ ]Hospice  [ ]Child Life  [ ]Social Work  [ ]Case management [ ]Holistic Therapy     Goals of Care Document: SUBJECTIVE AND OBJECTIVE: Patient seen and examined. follow up for pain. Has utilized x3 PRNs over last 24 hours. States that 1.5mg IV morphine has been helpful when she has worsening pain but shows reluctance with taking medications. Discussed utilizing the medication to help improve quality of life and functionality. Offered dose of pain medication at time of evaluation as patient rating pain 8/10 but she refused. Stating that 8 is "tolerable." last BM was with colonoscopy.    INTERVAL HPI/OVERNIGHT EVENTS: no acute overnight events.     DNR on chart:   Allergies    No Known Allergies    Intolerances    MEDICATIONS  (STANDING):  enoxaparin Injectable 70 milliGRAM(s) SubCutaneous every 12 hours  FIRST- Mouthwash  BLM 5 milliLiter(s) Swish and Spit four times a day  gabapentin 100 milliGRAM(s) Oral at bedtime  methylPREDNISolone sodium succinate Injectable 40 milliGRAM(s) IV Push daily  multivitamin 1 Tablet(s) Oral daily  pantoprazole  Injectable 40 milliGRAM(s) IV Push every 12 hours  senna 2 Tablet(s) Oral at bedtime    MEDICATIONS  (PRN):  morphine  - Injectable 1.5 milliGRAM(s) IV Push every 2 hours PRN Moderate to Severe pain  ondansetron Injectable 4 milliGRAM(s) IV Push every 6 hours PRN Nausea      ITEMS UNCHECKED ARE NOT PRESENT    PRESENT SYMPTOMS: [ ]Unable to obtain due to poor mentation   Source if other than patient:  [ ]Family   [ ]Team     Pain:  [x ]yes [ ]no  QOL impact - difficulty with mobility  Location -      lower back              Aggravating factors -movement  Quality - aching, sharp at times  Radiation -across her lower back  Timing- constant, worse with movement  Severity (0-10 scale):10  Minimal acceptable level (0-10 scale): states that 8 is "tolerable"    Dyspnea:                           [ ]Mild [ ]Moderate [ ]Severe  Anxiety:                             [ ]Mild [ ]Moderate [ ]Severe  Fatigue:                             [ ]Mild [x ]Moderate [ ]Severe  Nausea:                             [ ]Mild [ ]Moderate [ ]Severe  Loss of appetite:              [ ]Mild [ ]Moderate [ ]Severe  Constipation:                    [ ]Mild [ ]Moderate [ ]Severe    PAIN AD Score:	  http://geriatrictoolkit.[email or web address]/cog/painad.pdf (Ctrl + left click to view)    Other Symptoms:  [x ]All other review of systems negative     Palliative Performance Status Version 2:       40  %      http://npcrc.org/files/news/palliative_performance_scale_ppsv2.pdf  PHYSICAL EXAM:  Vital Signs Last 24 Hrs  T(C): 36.9 (06 Jun 2020 07:35), Max: 36.9 (05 Jun 2020 23:30)  T(F): 98.4 (06 Jun 2020 07:35), Max: 98.5 (05 Jun 2020 23:30)  HR: 75 (06 Jun 2020 09:24) (75 - 110)  BP: 129/73 (06 Jun 2020 09:24) (120/74 - 131/79)  BP(mean): --  RR: 18 (06 Jun 2020 09:24) (17 - 18)  SpO2: 96% (06 Jun 2020 09:24) (95% - 97%) I&O's Summary     GENERAL:  [x ]Alert  [ x]Oriented x3   [ ]Lethargic  [ ]Cachexia  [ ]Unarousable  [ ]Verbal  [ ]Non-Verbal  Behavioral:   [ ]Anxiety  [ ]Delirium [ ]Agitation [ ]Other  HEENT:  [ ]Normal   [x ]Dry mouth   [ ]ET Tube/Trach  [ ]Oral lesions  PULMONARY:   [x ]Clear [ ]Tachypnea  [ ]Audible excessive secretions   [ ]Rhonchi        [ ]Right [ ]Left [ ]Bilateral  [ ]Crackles        [ ]Right [ ]Left [ ]Bilateral  [ ]Wheezing     [ ]Right [ ]Left [ ]Bilateral  [ ]Diminished BS [ ] Right [ ]Left [ ]Bilateral  CARDIOVASCULAR:    [x ]Regular [ ]Irregular [ ]Tachy  [ ]Abner [ ]Murmur [ ]Other  GASTROINTESTINAL:  [x ]Soft  [ ]Distended   [x ]+BS  [ x]Non tender [ ]Tender  [ ]PEG [ ]OGT/ NGT   Last BM:   GENITOURINARY:  [x ]Normal [ ]Incontinent   [ ]Oliguria/Anuria   [ ]Birch  MUSCULOSKELETAL:   [ ]Normal   [x ]Weakness  [ ]Bed/Wheelchair bound [x ]Edema  NEUROLOGIC:   [ x]No focal deficits  [ ] Cognitive impairment  [ ] Dysphagia [ ]Dysarthria [ ] Paresis [ ]Other   SKIN: ecchymoses  [ ]Normal  [ ]Rash   [ ]Pressure ulcer(s) [ ]y [ ]n present on admission    CRITICAL CARE:  [ ]Shock Present  [ ]Septic [ ]Cardiogenic [ ]Neurologic [ ]Hypovolemic  [ ]Vasopressors [ ]Inotropes  [ ]Respiratory failure present [ ]Mechanical Ventilation [ ]Non-invasive ventilatory support [ ]High-Flow  [ ]Acute  [ ]Chronic [ ]Hypoxic  [ ]Hypercarbic [ ]Other  [ ]Other organ failure     LABS:                        8.2    13.24 )-----------( 562      ( 06 Jun 2020 06:19 )             27.1   06-05    147<H>  |  109<H>  |  9   ----------------------------<  150<H>  3.6   |  27  |  0.54    Ca    9.0      05 Jun 2020 11:40    PTT - ( 05 Jun 2020 00:26 )  PTT:55.3 sec      RADIOLOGY & ADDITIONAL STUDIES: no new imaging studies    Protein Calorie Malnutrition Present: [ ]mild [ x]moderate [ ]severe [ ]underweight [ ]morbid obesity  https://www.andeal.org/vault/2440/web/files/ONC/Table_Clinical%20Characteristics%20to%20Document%20Malnutrition-White%20JV%20et%20al%426053.pdf    Height (cm): 167.6 (06-03-20 @ 20:28), 167.6 (09-17-19 @ 09:00)  Weight (kg): 74.2 (06-03-20 @ 20:28), 71.9 (09-17-19 @ 09:00)  BMI (kg/m2): 26.4 (06-03-20 @ 20:28), 25.6 (09-17-19 @ 09:00)    [ ]PPSV2 < or = 30%  [ ]significant weight loss [x ]poor nutritional intake [ ]anasarca   Albumin, Serum: 2.8 g/dL (06-04-20 @ 05:03)   [ ]Artificial Nutrition    REFERRALS:   [ ]Chaplaincy  [ ]Hospice  [ ]Child Life  [ ]Social Work  [ x]Case management [ ]Holistic Therapy     Goals of Care Document:

## 2020-06-06 NOTE — PHYSICAL THERAPY INITIAL EVALUATION ADULT - RANGE OF MOTION EXAMINATION, REHAB EVAL
bilateral lower extremity was ROM was WNL (within normal limits)/Left UE ROM was WFL (within functional limits)/R ue n/a sec. pt. refused.

## 2020-06-06 NOTE — PHYSICAL THERAPY INITIAL EVALUATION ADULT - ADDITIONAL COMMENTS
As per pt., she lives in apt. with brother, 3 steps to get in. No HHA services. Patient ambulated without AD independent. pt. owns no DME. attempted to contact pt's family. No answer.

## 2020-06-06 NOTE — PROGRESS NOTE ADULT - PROBLEM SELECTOR PLAN 1
appears more comfortable on exam today, normal RR and speaking in full sentences.  morphine IV prn for dyspnea, goal RR <26  on solumedrol

## 2020-06-07 NOTE — PROGRESS NOTE ADULT - PROBLEM SELECTOR PLAN 2
-diagnosed in 2018, s/p chemo and radiation treatment which was done 6 months prior   -extensive skin changes on R chest wall   -onc consult  -palliative eval in progress, continue current pain regiment   -poor prognosis   -seems that pt was lost to follow up from previous oncologist

## 2020-06-07 NOTE — PROGRESS NOTE ADULT - PROBLEM SELECTOR PLAN 1
complains of some shortness of breath today, remains on supplemental oxygen  can give morphine IV prn for dyspnea, goal RR <26  on solumedrol

## 2020-06-07 NOTE — PROGRESS NOTE ADULT - PROBLEM SELECTOR PLAN 3
-hgb 6.7 , reported dark stools s/p 1 U pRBCs transfusion  -H&H stable   -continue with PPI 40mg daily  -EGD / colon negative for active bleed

## 2020-06-07 NOTE — PROGRESS NOTE ADULT - PROBLEM SELECTOR PLAN 5
remain available for pain management. recommendations as above  [phone] if acute issues or intractable symptoms arise

## 2020-06-07 NOTE — PROGRESS NOTE ADULT - SUBJECTIVE AND OBJECTIVE BOX
SUBJECTIVE AND OBJECTIVE: Patient seen and examined; no PRNs used over last 24 hours. States pain today mainly in RUE, but does not want to take pain medications. At time of eval, 8/10 which patients again states is tolerable. She is drowsy at times, but easily arousable and answering questions appropriately.    INTERVAL HPI/OVERNIGHT EVENTS:no acute overnight events.     DNR on chart:   Allergies    No Known Allergies    Intolerances    MEDICATIONS  (STANDING):  enoxaparin Injectable 70 milliGRAM(s) SubCutaneous every 12 hours  FIRST- Mouthwash  BLM 5 milliLiter(s) Swish and Spit four times a day  gabapentin 100 milliGRAM(s) Oral at bedtime  methylPREDNISolone sodium succinate Injectable 40 milliGRAM(s) IV Push daily  multivitamin 1 Tablet(s) Oral daily  pantoprazole  Injectable 40 milliGRAM(s) IV Push every 12 hours  senna 2 Tablet(s) Oral at bedtime    MEDICATIONS  (PRN):  morphine  - Injectable 1.5 milliGRAM(s) IV Push every 2 hours PRN Moderate to Severe pain  ondansetron Injectable 4 milliGRAM(s) IV Push every 6 hours PRN Nausea      ITEMS UNCHECKED ARE NOT PRESENT    PRESENT SYMPTOMS: [ ]Unable to obtain due to poor mentation   Source if other than patient:  [ ]Family   [ ]Team     Pain:  [x ]yes [ ]no  QOL impact - mobility compromised  Location -      RUE, lower back              Aggravating factors - movement  Quality -aching  Radiation -down arm, across lower back  Timing-intermittent  Severity (0-10 scale):8  Minimal acceptable level (0-10 scale): says 8 is tolerable    Dyspnea:                           [ ]Mild [ x]Moderate [ ]Severe  Anxiety:                             [ ]Mild [ ]Moderate [ ]Severe  Fatigue:                             [ ]Mild [x ]Moderate [ ]Severe  Nausea:                             [ ]Mild [ ]Moderate [ ]Severe  Loss of appetite:              [ ]Mild [ ]Moderate [ ]Severe  Constipation:                    [ ]Mild [ ]Moderate [ ]Severe    PAIN AD Score:	  http://geriatrictoolkit.[employer]/cog/painad.pdf (Ctrl + left click to view)    Other Symptoms:  [x ]All other review of systems negative     Palliative Performance Status Version 2:       40  %      http://[facility].org/files/news/palliative_performance_scale_[email or web address].pdf  PHYSICAL EXAM:  Vital Signs Last 24 Hrs  T(C): 36.9 (2020 10:53), Max: 38.8 (2020 00:19)  T(F): 98.5 (2020 10:53), Max: 101.8 (2020 00:19)  HR: 89 (2020 07:23) (89 - 118)  BP: 126/78 (2020 07:23) (126/78 - 134/76)  BP(mean): --  RR: 18 (2020 07:23) (17 - 18)  SpO2: 94% (2020 07:23) (93% - 95%) I&O's Summary    2020 07:01  -  2020 07:00  --------------------------------------------------------  IN: 580 mL / OUT: 800 mL / NET: -220 mL    2020 07:01  -  2020 12:42  --------------------------------------------------------  IN: 140 mL / OUT: 0 mL / NET: 140 mL       GENERAL:  [x ]Alert  [x ]Oriented x2   [ ]Lethargic  [ ]Cachexia  [ ]Unarousable  [ ]Verbal  [ ]Non-Verbal  Behavioral:   [ ]Anxiety  [ ]Delirium [ ]Agitation [ ]Other  HEENT:  [ ]Normal   [x ]Dry mouth   [ ]ET Tube/Trach  [ ]Oral lesions  PULMONARY:   [ ]Clear [ ]Tachypnea  [ ]Audible excessive secretions   [ ]Rhonchi        [ ]Right [ ]Left [ ]Bilateral  [ x]Crackles        [ ]Right [ ]Left [x ]Bilateral  [ ]Wheezing     [ ]Right [ ]Left [ ]Bilateral  [ ]Diminished BS [ ] Right [ ]Left [ ]Bilateral  CARDIOVASCULAR:    [x ]Regular [ ]Irregular [ ]Tachy  [ ]Abner [ ]Murmur [ ]Other  GASTROINTESTINAL:  [ x]Soft  [ ]Distended   [x ]+BS  [x ]Non tender [ ]Tender  [ ]PEG [ ]OGT/ NGT   Last BM:   GENITOURINARY:  [ ]Normal [x ]Incontinent   [ ]Oliguria/Anuria   [ ]Birch  MUSCULOSKELETAL:   [ ]Normal   [ x]Weakness  [ x]Bed/Wheelchair bound [ ]Edema  NEUROLOGIC: drowsy at times but non focal  [ x]No focal deficits  [ ] Cognitive impairment  [ ] Dysphagia [ ]Dysarthria [ ] Paresis [ ]Other   SKIN: ecchymoses  [ ]Normal  [ ]Rash   [ ]Pressure ulcer(s) [ ]y [ ]n present on admission    CRITICAL CARE:  [ ]Shock Present  [ ]Septic [ ]Cardiogenic [ ]Neurologic [ ]Hypovolemic  [ ]Vasopressors [ ]Inotropes  [ ]Respiratory failure present [ ]Mechanical Ventilation [ ]Non-invasive ventilatory support [ ]High-Flow  [ ]Acute  [ ]Chronic [ ]Hypoxic  [ ]Hypercarbic [ ]Other  [ ]Other organ failure     LABS:                        8.4    12.58 )-----------( 541      ( 2020 01:33 )             26.7       139  |  102  |  14  ----------------------------<  127<H>  3.8   |  24  |  0.68    Ca    8.5      2020 01:33        Urinalysis Basic - ( 2020 05:45 )    Color: Yellow / Appearance: Clear / S.023 / pH: x  Gluc: x / Ketone: Negative  / Bili: Negative / Urobili: <2 mg/dL   Blood: x / Protein: Trace / Nitrite: Negative   Leuk Esterase: Small / RBC: 2 /HPF / WBC 5 /HPF   Sq Epi: x / Non Sq Epi: 0 /HPF / Bacteria: Negative      RADIOLOGY & ADDITIONAL STUDIES: no new imaging studies    Protein Calorie Malnutrition Present: [ ]mild [ x]moderate [ ]severe [ ]underweight [ ]morbid obesity  https://www.andeal.org/vault/6480/web/files/ONC/Table_Clinical%20Characteristics%20to%20Document%20Malnutrition-White%20JV%20et%20al%2020.pdf    Height (cm): 167.6 (20 @ 20:28), 167.6 (19 @ 09:00)  Weight (kg): 74.2 (20 @ 20:28), 71.9 (19 @ 09:00)  BMI (kg/m2): 26.4 (20 @ 20:28), 25.6 (19 @ 09:00)    [ ]PPSV2 < or = 30%  [ ]significant weight loss [x ]poor nutritional intake [ ]anasarca   Albumin, Serum: 2.8 g/dL (20 @ 05:03)   [ ]Artificial Nutrition    REFERRALS:   [ ]Chaplaincy  [ ]Hospice  [ ]Child Life  [ ]Social Work  [x ]Case management [ ]Holistic Therapy     Goals of Care Document:

## 2020-06-07 NOTE — PROGRESS NOTE ADULT - SUBJECTIVE AND OBJECTIVE BOX
slight improvement in dyspnea    enoxaparin Injectable 70 milliGRAM(s) SubCutaneous every 12 hours  FIRST- Mouthwash  BLM 5 milliLiter(s) Swish and Spit four times a day  gabapentin 100 milliGRAM(s) Oral at bedtime  methylPREDNISolone sodium succinate Injectable 40 milliGRAM(s) IV Push daily  morphine  - Injectable 1.5 milliGRAM(s) IV Push every 2 hours PRN  multivitamin 1 Tablet(s) Oral daily  ondansetron Injectable 4 milliGRAM(s) IV Push every 6 hours PRN  pantoprazole  Injectable 40 milliGRAM(s) IV Push every 12 hours  senna 2 Tablet(s) Oral at bedtime      No Known Allergies      ROS otherwise negative     T(C): 36.9 (06-07-20 @ 16:23), Max: 38.8 (06-07-20 @ 00:19)  HR: 98 (06-07-20 @ 16:23) (89 - 118)  BP: 117/74 (06-07-20 @ 16:23) (117/74 - 128/81)  RR: 18 (06-07-20 @ 16:23) (17 - 18)  SpO2: 95% (06-07-20 @ 16:23) (93% - 95%)  PHYSICAL EXAM  Gen:  laying in bed, nad  H:  anicteric, eomi    Patient examination limited due to concerns for COVID19 cross transmission and potential local limitations for sufficient personal protective equipment.  Risks of physical examination therefore deemed to outweigh the benefit for the purposes of this encounter.                          8.4    12.58 )-----------( 541      ( 07 Jun 2020 01:33 )             26.7                         8.2    13.24 )-----------( 562      ( 06 Jun 2020 06:19 )             27.1                         8.0    10.26 )-----------( 526      ( 05 Jun 2020 07:01 )             26.0   06-07    139  |  102  |  14  ----------------------------<  127<H>  3.8   |  24  |  0.68    Ca    8.5      07 Jun 2020 01:33

## 2020-06-07 NOTE — PROGRESS NOTE ADULT - SUBJECTIVE AND OBJECTIVE BOX
Patient is a 69y old  Female who presents with a chief complaint of SOB and RUE swelling x one month (2020 17:58)      SUBJECTIVE / OVERNIGHT EVENTS: Fever noted overnight.  No new localizing signs/symptoms    MEDICATIONS  (STANDING):  enoxaparin Injectable 70 milliGRAM(s) SubCutaneous every 12 hours  FIRST- Mouthwash  BLM 5 milliLiter(s) Swish and Spit four times a day  gabapentin 100 milliGRAM(s) Oral at bedtime  methylPREDNISolone sodium succinate Injectable 40 milliGRAM(s) IV Push daily  multivitamin 1 Tablet(s) Oral daily  pantoprazole  Injectable 40 milliGRAM(s) IV Push every 12 hours  senna 2 Tablet(s) Oral at bedtime    MEDICATIONS  (PRN):  morphine  - Injectable 1.5 milliGRAM(s) IV Push every 2 hours PRN Moderate to Severe pain  ondansetron Injectable 4 milliGRAM(s) IV Push every 6 hours PRN Nausea      T(C): 37.3 (20 @ 07:23), Max: 38.8 (20 @ 00:19)  HR: 89 (20 @ 07:23) (89 - 118)  BP: 126/78 (20 @ 07:23) (126/78 - 134/76)  RR: 18 (20 @ 07:23) (17 - 18)  SpO2: 94% (20 @ 07:23) (93% - 95%)  CAPILLARY BLOOD GLUCOSE        I&O's Summary    2020 07:01  -  2020 07:00  --------------------------------------------------------  IN: 580 mL / OUT: 800 mL / NET: -220 mL        PHYSICAL EXAM:  GENERAL: NAD, sleeping but arousable  HEAD:  Atraumatic, Normocephalic  EYES: EOMI, PERRLA, conjunctiva and sclera clear  NECK: Supple, No JVD  CHEST/LUNG: Clear to auscultation bilaterally; No wheeze, Right sided chest ulcer unchanged, dressing c/d/i  HEART: Regular rate and rhythm; No murmurs, rubs, or gallops  ABDOMEN: Soft, Nontender, Nondistended; Bowel sounds present  EXTREMITIES:  2+ Peripheral Pulses, unchanged RUE edema  PSYCH: AAOx3    LABS:                        8.4    12.58 )-----------( 541      ( 2020 01:33 )             26.7     06-    139  |  102  |  14  ----------------------------<  127<H>  3.8   |  24  |  0.68    Ca    8.5      2020 01:33            Urinalysis Basic - ( 2020 05:45 )    Color: Yellow / Appearance: Clear / S.023 / pH: x  Gluc: x / Ketone: Negative  / Bili: Negative / Urobili: <2 mg/dL   Blood: x / Protein: Trace / Nitrite: Negative   Leuk Esterase: Small / RBC: 2 /HPF / WBC 5 /HPF   Sq Epi: x / Non Sq Epi: 0 /HPF / Bacteria: Negative        RADIOLOGY & ADDITIONAL TESTS:    Imaging Personally Reviewed:    Consultant(s) Notes Reviewed:      Care Discussed with Consultants/Other Providers:

## 2020-06-07 NOTE — PROGRESS NOTE ADULT - PROBLEM SELECTOR PLAN 3
no PRNs required over last 24 hours, rating pain 8/10 but reluctant to ask for more medication.   continue current regimen  completed tylenol ATC course  gabapentin HS

## 2020-06-08 NOTE — PROGRESS NOTE ADULT - PROBLEM SELECTOR PLAN 2
-diagnosed in 2018, s/p chemo and radiation treatment which was done 6 months prior   -extensive skin changes on R chest wall   -onc consult  -palliative eval in progress, continue current pain regiment   -poor prognosis   -seems that pt was lost to follow up from previous oncologist  -seen by heme onc, to f/u as an outpatient for palliative chemo  -discussed with palliative team as well, will f/u recs

## 2020-06-08 NOTE — PROGRESS NOTE ADULT - PROBLEM SELECTOR PLAN 1
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 Also has small R pleural effusion  -etiology likely multifactorial   -appreciate pulm recs, continue solumedrol 40mg IV daily for lymphagitic spread, will f/u with pulm regarding when to d/c or taper down   -O2 as needed

## 2020-06-08 NOTE — PROGRESS NOTE ADULT - SUBJECTIVE AND OBJECTIVE BOX
Patient is a 69y old  Female who presents with a chief complaint of SOB and RUE swelling x one month (07 Jun 2020 19:07)    SUBJECTIVE / OVERNIGHT EVENTS: no acute events overnight. Pt continues to have swelling in her RUE.     MEDICATIONS  (STANDING):  enoxaparin Injectable 70 milliGRAM(s) SubCutaneous every 12 hours  FIRST- Mouthwash  BLM 5 milliLiter(s) Swish and Spit four times a day  gabapentin 100 milliGRAM(s) Oral at bedtime  methylPREDNISolone sodium succinate Injectable 40 milliGRAM(s) IV Push daily  multivitamin 1 Tablet(s) Oral daily  pantoprazole  Injectable 40 milliGRAM(s) IV Push every 12 hours  senna 2 Tablet(s) Oral at bedtime    MEDICATIONS  (PRN):  morphine  - Injectable 1.5 milliGRAM(s) IV Push every 2 hours PRN Moderate to Severe pain  ondansetron Injectable 4 milliGRAM(s) IV Push every 6 hours PRN Nausea      Vital Signs Last 24 Hrs  T(C): 37.1 (08 Jun 2020 00:44), Max: 37.1 (08 Jun 2020 00:44)  T(F): 98.7 (08 Jun 2020 00:44), Max: 98.7 (08 Jun 2020 00:44)  HR: 110 (08 Jun 2020 09:30) (98 - 110)  BP: 116/72 (08 Jun 2020 09:30) (116/72 - 121/75)  BP(mean): --  RR: 18 (08 Jun 2020 09:30) (18 - 18)  SpO2: 98% (08 Jun 2020 09:30) (95% - 98%)  CAPILLARY BLOOD GLUCOSE        I&O's Summary    07 Jun 2020 07:01  -  08 Jun 2020 07:00  --------------------------------------------------------  IN: 260 mL / OUT: 750 mL / NET: -490 mL    PHYSICAL EXAM:  GENERAL: NAD  EYES:  conjunctiva and sclera clear  NECK: Supple, No JVD  CHEST/LUNG: Clear to auscultation bilaterally; No wheeze  HEART: +S1/S2, reg   ABDOMEN: Soft, Nontender  EXTREMITIES: RUE edema   PSYCH: AAOx3  SKIN: extensive lesion over the R breast area     LABS:                        7.9    14.85 )-----------( 490      ( 08 Jun 2020 06:34 )             26.0     06-08    140  |  102  |  12  ----------------------------<  108<H>  3.7   |  26  |  0.52    Ca    8.5      08 Jun 2020 06:33

## 2020-06-09 NOTE — DIETITIAN INITIAL EVALUATION ADULT. - ADD RECOMMEND
1. Recommend Ensure Enlive x 2 daily (350kcal, 20g protein per 8 ounces). 2. Encouraged PO intake-small, frequent meals and nutrient dense snacks. In house, encouraged pt to order nutrient dense snacks c meals to consume in between meals to mimic small, frequent meals. Discussed protein rich foods available on menu. Discussed consuming protein first at meal times and then eating the other foods. 1. Recommend Ensure Enlive x 2 daily (350kcal, 20g protein per 8 ounces). 2. Encouraged PO intake-small, frequent meals and nutrient dense snacks. In house, encouraged pt to order nutrient dense snacks c meals to consume in between meals to mimic small, frequent meals. Discussed protein rich foods available on menu. 3. Recommend Manny x 2 packets daily to provide conditionally essential amino acids for wound healing. Recommend vitamin C supplementation to aid in wound healing.

## 2020-06-09 NOTE — PROGRESS NOTE ADULT - PROBLEM SELECTOR PLAN 1
-diagnosed in 2018, s/p chemo and radiation treatment which was done 6 months prior   -extensive skin changes on R chest wall   -onc consult  -palliative eval in progress, continue current pain regiment   -poor prognosis   -seems that pt was lost to follow up from previous oncologist  -seen by heme onc, to f/u as an outpatient for palliative chemo  -discussed with oncologist today --> recommending palliative eval, unsure if pt will be able to tolerate outpt chemo given poor functional status, however based on my discussion with patient this morning she was open to therapy if offered. Will f/u with patient again and also with palliative.

## 2020-06-09 NOTE — OCCUPATIONAL THERAPY INITIAL EVALUATION ADULT - PLANNED THERAPY INTERVENTIONS, OT EVAL
balance training/transfer training/ADL retraining/bed mobility training/fine motor coordination training

## 2020-06-09 NOTE — OCCUPATIONAL THERAPY INITIAL EVALUATION ADULT - DIAGNOSIS, OT EVAL
Pt presents with pain, decreased ROM, sensation, strength, endurance, balance, and coordination, all impacting ability to perform ADLs and functional mobility.

## 2020-06-09 NOTE — DIETITIAN INITIAL EVALUATION ADULT. - REASON INDICATOR FOR ASSESSMENT
Pt seen for LOS.   Source: pt, RN     Pt admitted c SOB, RUE swelling, found to have right brachial DVT, pt c breast cancer, microcytic anemia, on overt GIB, iron deficiency anemia. Noted pt seen by Wound Care for fungating right breast mass. Palliative Care following for GOC and pain management, pt c poor prognosis.

## 2020-06-09 NOTE — PROGRESS NOTE ADULT - SUBJECTIVE AND OBJECTIVE BOX
Patient is a 69y old  Female who presents with a chief complaint of SOB and RUE swelling x one month (09 Jun 2020 05:49)      SUBJECTIVE / OVERNIGHT EVENTS: no acute events overnight     MEDICATIONS  (STANDING):  enoxaparin Injectable 70 milliGRAM(s) SubCutaneous every 12 hours  FIRST- Mouthwash  BLM 5 milliLiter(s) Swish and Spit four times a day  gabapentin 100 milliGRAM(s) Oral at bedtime  methylPREDNISolone sodium succinate Injectable 40 milliGRAM(s) IV Push daily  multivitamin 1 Tablet(s) Oral daily  pantoprazole  Injectable 40 milliGRAM(s) IV Push every 12 hours  senna 2 Tablet(s) Oral at bedtime    MEDICATIONS  (PRN):  morphine  - Injectable 1.5 milliGRAM(s) IV Push every 2 hours PRN Moderate to Severe pain  ondansetron Injectable 4 milliGRAM(s) IV Push every 6 hours PRN Nausea  oxycodone    5 mG/acetaminophen 325 mG 1 Tablet(s) Oral every 6 hours PRN Moderate Pain (4 - 6)      Vital Signs Last 24 Hrs  T(C): 37.2 (09 Jun 2020 07:24), Max: 37.2 (08 Jun 2020 16:12)  T(F): 99 (09 Jun 2020 07:24), Max: 99 (08 Jun 2020 16:12)  HR: 102 (09 Jun 2020 07:24) (96 - 102)  BP: 124/75 (09 Jun 2020 07:24) (123/71 - 130/78)  BP(mean): --  RR: 18 (09 Jun 2020 07:24) (18 - 18)  SpO2: 94% (09 Jun 2020 07:24) (94% - 96%)  CAPILLARY BLOOD GLUCOSE        I&O's Summary    08 Jun 2020 07:01  -  09 Jun 2020 07:00  --------------------------------------------------------  IN: 0 mL / OUT: 800 mL / NET: -800 mL        PHYSICAL EXAM:  GENERAL: NAD, sitting up in bed   EYES:  conjunctiva and sclera clear  NECK: Supple, No JVD  CHEST/LUNG: Clear to auscultation bilaterally; No wheeze.   HEART: +S1/S2, reg   ABDOMEN: Soft, Nontender  EXTREMITIES: RUE edema   PSYCH: AAOx3  SKIN: extensive fungating lesion over the R breast area     LABS:                        8.1    14.14 )-----------( 511      ( 09 Jun 2020 07:07 )             26.8     06-08    140  |  102  |  12  ----------------------------<  108<H>  3.7   |  26  |  0.52    Ca    8.5      08 Jun 2020 06:33        Care Discussed with Consultants/Other Providers: Dr. Jerez Oncology Attending

## 2020-06-09 NOTE — DIETITIAN INITIAL EVALUATION ADULT. - PERTINENT MEDS FT
MEDICATIONS  (STANDING):  enoxaparin Injectable 70 milliGRAM(s) SubCutaneous every 12 hours  FIRST- Mouthwash  BLM 5 milliLiter(s) Swish and Spit four times a day  gabapentin 100 milliGRAM(s) Oral at bedtime  methylPREDNISolone sodium succinate Injectable 40 milliGRAM(s) IV Push daily  multivitamin 1 Tablet(s) Oral daily  pantoprazole  Injectable 40 milliGRAM(s) IV Push every 12 hours  senna 2 Tablet(s) Oral at bedtime    MEDICATIONS  (PRN):  morphine  - Injectable 1.5 milliGRAM(s) IV Push every 2 hours PRN Moderate to Severe pain  ondansetron Injectable 4 milliGRAM(s) IV Push every 6 hours PRN Nausea  oxycodone    5 mG/acetaminophen 325 mG 1 Tablet(s) Oral every 6 hours PRN Moderate Pain (4 - 6)

## 2020-06-09 NOTE — OCCUPATIONAL THERAPY INITIAL EVALUATION ADULT - LIVES WITH, PROFILE
Pt lives in a house with her brother, +3 steps to enter, +shower tub with shower chair, +pt is a  & R hand dominant. Pt states her inability to use R arm started very recently and prior to that was IND with all ADL/IADL

## 2020-06-09 NOTE — DIETITIAN INITIAL EVALUATION ADULT. - OTHER INFO
Pt reports suboptimal intake and appetite at home for the last 2 months, states she was eating small amounts for all meals, consuming about 50% of her meals, eating a variety of foods at home per diet recall. Reports she was taking Ensure shakes at home, at times twice daily.     Pt reports NKFA. States she was taking vitamin D, vitamin B-complex and a multivitamin at home.     Pt reports her wt had increased to 160 pounds but then has decreased now to 140 pounds. Noted wt of 158 pounds as per previous RD note from September 2019 and diagnosis of moderate malnutrition at that time. Noted admit wt of 163.5 pounds in house. Questionable accuracy of wt, would monitor.

## 2020-06-09 NOTE — CHART NOTE - NSCHARTNOTEFT_GEN_A_CORE
Upon Nutritional Assessment by the Registered Dietitian your patient was determined to meet criteria / has evidence of the following diagnosis/diagnoses:          [ ]  Mild Protein Calorie Malnutrition        [x]  Moderate Protein Calorie Malnutrition        [ ] Severe Protein Calorie Malnutrition        [ ] Unspecified Protein Calorie Malnutrition        [ ] Underweight / BMI <19        [ ] Morbid Obesity / BMI > 40      Findings as based on:  [x] Comprehensive nutrition assessment   [x] Nutrition Focused Physical Exam  [ ] Other:       Nutrition Plan/Recommendations:      1. Recommend Ensure Enlive x 2 daily (350kcal, 20g protein per 8 ounces).   2. Encouraged PO intake-small, frequent meals and nutrient dense snacks. In house, encouraged pt to order nutrient dense snacks c meals to consume in between meals to mimic small, frequent meals. Discussed protein rich foods available on menu. Discussed consuming protein first at meal times and then eating the other foods.    PROVIDER Section:     By signing this assessment you are acknowledging and agree with the diagnosis/diagnoses assigned by the Registered Dietitian    Comments:

## 2020-06-09 NOTE — DIETITIAN INITIAL EVALUATION ADULT. - FACTORS AFF FOOD INTAKE
pt reports suboptimal appetite and intake, has been skipping meals in house at times; reports she has been eating about 50% or less of meals in house; denies any chewing/swallowing issues; reports last BM 6/8

## 2020-06-09 NOTE — OCCUPATIONAL THERAPY INITIAL EVALUATION ADULT - ADL RETRAINING, OT EVAL
GOAL: Patient will require MIN A with UB dressing within 4 weeks GOAL: Patient will perform grooming standing sink level with CGA in 4 weeks

## 2020-06-09 NOTE — OCCUPATIONAL THERAPY INITIAL EVALUATION ADULT - RANGE OF MOTION EXAMINATION, UPPER EXTREMITY
No AROM R UE. PROM R wrist/digits/elbow WFL. PROM to <90* shoulder flexion/abduction/Left UE Active ROM was WFL (within functional limits)

## 2020-06-09 NOTE — OCCUPATIONAL THERAPY INITIAL EVALUATION ADULT - IMPAIRMENTS CONTRIBUTING IMPAIRED BED MOBILITY, REHAB EVAL
decreased ROM/impaired motor control/decreased sensation/decreased strength/impaired balance/impaired postural control/impaired coordination

## 2020-06-09 NOTE — DIETITIAN INITIAL EVALUATION ADULT. - PHYSICAL APPEARANCE
other (specify) Pt agreeable to nutrition focused physical exam, pt c mild to moderate muscle loss around temples, moderate muscle loss around shoulders and muscle loss around calves masked by edema, pt c moderate fat loss around orbital and buccal region and mild fat loss around tricep region.    Skin: no pressure injuries   Edema: +1 right wrist, arm, and hand Pt agreeable to nutrition focused physical exam, pt c mild to moderate muscle loss around temples, moderate muscle loss around shoulders and muscle loss around calves masked by edema, pt c moderate fat loss around orbital and buccal region and mild fat loss around tricep region.    Skin: left buttock pressure injury x 2  Edema: +1 right wrist, arm, and hand

## 2020-06-09 NOTE — OCCUPATIONAL THERAPY INITIAL EVALUATION ADULT - PERTINENT HX OF CURRENT PROBLEM, REHAB EVAL
68 y/o F  PMH right breast cancer (diagnosed in 2018) s/p chemotherapy and radiation treatment ( last session about 6 months prior to admission), anemia, MVP, p/w SOB & R arm pain for the past month.

## 2020-06-09 NOTE — PROGRESS NOTE ADULT - SUBJECTIVE AND OBJECTIVE BOX
Pt is seen and examined  pt is awake and lying in bed   very weak  reports still SOB  No fevers      PAST MEDICAL & SURGICAL HISTORY:  Lymphedema: rt arm  H/O hemorrhoids  Mitral valve prolapse  Anemia  Breast cancer  H/O inguinal hernia repair  H/O umbilical hernia repair  H/O hemorrhoidectomy      ROS:  Negative except for:    MEDICATIONS  (STANDING):  enoxaparin Injectable 70 milliGRAM(s) SubCutaneous every 12 hours  FIRST- Mouthwash  BLM 5 milliLiter(s) Swish and Spit four times a day  gabapentin 100 milliGRAM(s) Oral at bedtime  methylPREDNISolone sodium succinate Injectable 40 milliGRAM(s) IV Push daily  multivitamin 1 Tablet(s) Oral daily  pantoprazole  Injectable 40 milliGRAM(s) IV Push every 12 hours  senna 2 Tablet(s) Oral at bedtime    MEDICATIONS  (PRN):  morphine  - Injectable 1.5 milliGRAM(s) IV Push every 2 hours PRN Moderate to Severe pain  ondansetron Injectable 4 milliGRAM(s) IV Push every 6 hours PRN Nausea      Allergies    No Known Allergies    Intolerances        Vital Signs Last 24 Hrs  T(C): 37.2 (08 Jun 2020 23:56), Max: 37.2 (08 Jun 2020 16:12)  T(F): 99 (08 Jun 2020 23:56), Max: 99 (08 Jun 2020 16:12)  HR: 102 (08 Jun 2020 23:56) (96 - 110)  BP: 130/78 (08 Jun 2020 23:56) (116/72 - 130/78)  BP(mean): --  RR: 18 (08 Jun 2020 23:56) (18 - 18)  SpO2: 94% (08 Jun 2020 23:56) (94% - 98%)    PHYSICAL EXAM  General: chronically ill appearing F  in NAD  HEENT: clear oropharynx, anicteric sclera, pink conjunctiva  Neck: supple  CV: normal S1/S2 with no murmur rubs or gallops  Lungs: diminished bilat BS  Abdomen: soft non-tender non-distended, no hepatosplenomegaly  Ext: +RUE edema  Skin: no rashes and no petechiae  Neuro: alert and oriented X 4, unable to move RUE  LABS:                          7.9    14.85 )-----------( 490      ( 08 Jun 2020 06:34 )             26.0     Serial CBC's  06-08 @ 06:34  Hct-26.0 / Hgb-7.9 / Plat-490 / RBC-3.27 / WBC-14.85            06-08    140  |  102  |  12  ----------------------------<  108<H>  3.7   |  26  |  0.52    Ca    8.5      08 Jun 2020 06:33            WBC Count: 14.85 K/uL (06-08 @ 06:34)  Hemoglobin: 7.9 g/dL (06-08 @ 06:34)            RADIOLOGY & ADDITIONAL STUDIES:

## 2020-06-09 NOTE — OCCUPATIONAL THERAPY INITIAL EVALUATION ADULT - ANTICIPATED DISCHARGE DISPOSITION, OT EVAL
Subacute rehab. If pt goes home, will require assistance for all ADL/IADL/transfers/mobiltiy & home OT to address ADL/IADL, AE/DME, and fall prevention

## 2020-06-10 NOTE — PROGRESS NOTE ADULT - PROBLEM SELECTOR PLAN 1
Due to sedation and poor pain control will rotate to hydromorphone. Will DC Morphine since it is not working. Will DC Oxy since its current dose is twice the amount of IV Morphine she was currently prescribed and therefore more sedative.   Will start Modafinil to see if an stimulant may give more space to increase opioids and counteracting sedation.   Will try to use non opioids such as Gabapentin as an alternative. Will increase it to 100 mg tid. Improving.   Continue Neurontin 100 mg tid.   Dilaudid 0.2 mg IV q 2 PRN   Modafinil 100 mg q AM.   Monitoring for sedation and respiratory depression.   Narcan 0.1 mg q 3' up to 3 doses PRN for respiratory depression or sedation due to opioids    Will try to use non opioids such as Gabapentin as an alternative. Will increase it to 100 mg tid.

## 2020-06-10 NOTE — PROGRESS NOTE ADULT - PROBLEM SELECTOR PLAN 5
Will continue to f/u for GOC, ACP, and symptoms.         Cornelio Garcia MD   Geriatrics and Palliative Care ([facility]) Consult Service    of Geriatric and Palliative Medicine  [facility]      Please page the following number for clinical matters between the hours of 9 am and 5 pm from Monday through Friday : [phone]    After 5pm and on weekends, please see the contact information below:    In the event of newly developing, evolving, or worsening symptoms, please contact the Palliative Medicine team via pager (if the patient is at [facility] #[phone] or if the patient is at [facility] #[phone]) The Geriatric and Palliative Medicine service has coverage 24 hours a day/ 7 days a week to provide medical recommendations regarding symptom management needs via telephone

## 2020-06-10 NOTE — PROGRESS NOTE ADULT - SUBJECTIVE AND OBJECTIVE BOX
Patient is a 69y old  Female who presents with a chief complaint of SOB and RUE swelling x one month (10 Omer 2020 12:05)    SUBJECTIVE / OVERNIGHT EVENTS: no acute events overnight     MEDICATIONS  (STANDING):  enoxaparin Injectable 70 milliGRAM(s) SubCutaneous every 12 hours  FIRST- Mouthwash  BLM 5 milliLiter(s) Swish and Spit four times a day  gabapentin 100 milliGRAM(s) Oral three times a day  methylPREDNISolone sodium succinate Injectable 40 milliGRAM(s) IV Push daily  modafinil 100 milliGRAM(s) Oral daily  multivitamin 1 Tablet(s) Oral daily  pantoprazole  Injectable 40 milliGRAM(s) IV Push every 12 hours  senna 2 Tablet(s) Oral at bedtime    MEDICATIONS  (PRN):  HYDROmorphone  Injectable 0.2 milliGRAM(s) IV Push every 2 hours PRN Moderate to severe pain  ondansetron Injectable 4 milliGRAM(s) IV Push every 6 hours PRN Nausea      Vital Signs Last 24 Hrs  T(C): 37.1 (10 Omer 2020 07:10), Max: 37.1 (10 Omer 2020 07:10)  T(F): 98.7 (10 Omer 2020 07:10), Max: 98.7 (10 Omer 2020 07:10)  HR: 95 (10 Omer 2020 07:10) (87 - 95)  BP: 116/72 (10 Omer 2020 07:10) (116/72 - 119/74)  BP(mean): --  RR: 18 (10 Omer 2020 07:10) (18 - 18)  SpO2: 95% (10 Omer 2020 07:10) (95% - 96%)  CAPILLARY BLOOD GLUCOSE        I&O's Summary    09 Jun 2020 07:01  -  10 Omer 2020 07:00  --------------------------------------------------------  IN: 0 mL / OUT: 1000 mL / NET: -1000 mL        PHYSICAL EXAM:  GENERAL: NAD  EYES:  conjunctiva and sclera clear  NECK: Supple, No JVD  CHEST/LUNG: Clear to auscultation bilaterally; No wheeze.   HEART: +S1/S2, reg   ABDOMEN: Soft, Nontender  EXTREMITIES: RUE edema   PSYCH: AAOx3  SKIN: extensive fungating lesion over the R breast area       LABS:                        7.6    10.83 )-----------( 516      ( 10 Omer 2020 07:06 )             25.4

## 2020-06-10 NOTE — PROGRESS NOTE ADULT - PROBLEM SELECTOR PLAN 5
Will continue to f/u for GOC, ACP, and symptoms.         Cornelio Garcia MD   Geriatrics and Palliative Care ([facility]) Consult Service    of Geriatric and Palliative Medicine  [facility]      Please page the following number for clinical matters between the hours of 9 am and 5 pm from Monday through Friday : [phone]    After 5pm and on weekends, please see the contact information below:    In the event of newly developing, evolving, or worsening symptoms, please contact the Palliative Medicine team via pager (if the patient is at [facility] #[phone] or if the patient is at [facility] #[phone]) The Geriatric and Palliative Medicine service has coverage 24 hours a day/ 7 days a week to provide medical recommendations regarding symptom management needs via telephone

## 2020-06-10 NOTE — PROGRESS NOTE ADULT - SUBJECTIVE AND OBJECTIVE BOX
Pt seen, lethargic    MEDICATIONS  (STANDING):  enoxaparin Injectable 70 milliGRAM(s) SubCutaneous every 12 hours  FIRST- Mouthwash  BLM 5 milliLiter(s) Swish and Spit four times a day  gabapentin 100 milliGRAM(s) Oral three times a day  methylPREDNISolone sodium succinate Injectable 40 milliGRAM(s) IV Push daily  modafinil 100 milliGRAM(s) Oral daily  multivitamin 1 Tablet(s) Oral daily  pantoprazole  Injectable 40 milliGRAM(s) IV Push every 12 hours  senna 2 Tablet(s) Oral at bedtime    MEDICATIONS  (PRN):  HYDROmorphone  Injectable 0.2 milliGRAM(s) IV Push every 2 hours PRN Moderate to severe pain  ondansetron Injectable 4 milliGRAM(s) IV Push every 6 hours PRN Nausea      ROS  No fever, sweats, chills  No epistaxis, HA, sore throat  No CP, SOB, cough, sputum  No n/v/d, abd pain, melena, hematochezia  No edema  No rash  No anxiety  No back pain, joint pain  No bleeding, bruising  No dysuria, hematuria    Vital Signs Last 24 Hrs  T(C): 37.1 (10 Omer 2020 07:10), Max: 37.1 (10 Omer 2020 07:10)  T(F): 98.7 (10 Omer 2020 07:10), Max: 98.7 (10 Omer 2020 07:10)  HR: 95 (10 Omer 2020 07:10) (87 - 95)  BP: 116/72 (10 Omer 2020 07:10) (116/72 - 119/74)  BP(mean): --  RR: 18 (10 Omer 2020 07:10) (18 - 18)  SpO2: 95% (10 Omer 2020 07:10) (95% - 96%)    PE  NAD  Awake, alert  Anicteric, MMM  RRR  CTAB  Abd soft, NT, ND  No c/c/e  No rash grossly  FROM                          7.6    10.83 )-----------( 516      ( 10 Omer 2020 07:06 )             25.4

## 2020-06-10 NOTE — PROGRESS NOTE ADULT - PROBLEM SELECTOR PLAN 1
-diagnosed in 2018, s/p chemo and radiation treatment which was done 6 months prior   -extensive skin changes on R chest wall   -onc consult  -palliative eval in progress, continue current pain regiment   -poor prognosis   -seems that pt was lost to follow up from previous oncologist  -seen by heme onc, to f/u as an outpatient for palliative chemo  -pending [facility]NA placement, pt would like outpt palliative chemo

## 2020-06-10 NOTE — PROGRESS NOTE ADULT - PROBLEM SELECTOR PLAN 2
May be multifactorial; however, opioids may be playing a role. Will rotated opioids and adjust pain meds as above.   Will start Modafinil 100 mg AM.

## 2020-06-10 NOTE — PROGRESS NOTE ADULT - PROBLEM SELECTOR PLAN 1
Due to sedation and poor pain control will rotate to hydromorphone. Will DC Morphine since it is not working. Will DC Oxy since its current dose is twice the amount of IV Morphine she was currently prescribed and therefore more sedative.   Will start Modafinil to see if an stimulant may give more space to increase opioids and counteracting sedation.   Will try to use non opioids such as Gabapentin as an alternative. Will increase it to 100 mg tid.

## 2020-06-10 NOTE — PROGRESS NOTE ADULT - PROBLEM SELECTOR PLAN 4
HCP form was completed today.   As per Palliative Care SW's (Kj Gardner) note: "LMSW met with patient at bedside today and first provided overview of HCP form.  Patient verbalized understanding, and was receptive to completion of form  today. Patient identified her very close family friend (often referred to as  her "sister") Anh Hui, [phone], as sole proxy, and did not  identify any alternate. HCP form completed, and original provided to patient,  with copies uploaded to [facility] and placed on chart. Patient with no other needs or  concerns at present time."  I d/w the patient about her decision in regards to DMT and she said she is still thinking about it. Current plan is for R[facility]HANA.   Will try to address ACP before DC.

## 2020-06-10 NOTE — PROGRESS NOTE ADULT - PROBLEM SELECTOR PLAN 3
c/o mild dyspnea that she indicated is tolerable.   on solumedrol and O2.   Can also try Hydromorphone PRN for worsening symptoms.

## 2020-06-10 NOTE — PROGRESS NOTE ADULT - PROBLEM SELECTOR PLAN 4
HCP form was completed today.   As per Palliative Care SW's (Kj Gardner) note: "LMSW met with patient at bedside today and first provided overview of HCP form.  Patient verbalized understanding, and was receptive to completion of form  today. Patient identified her very close family friend (often referred to as  her "sister") Anh Hui, [phone], as sole proxy, and did not  identify any alternate. HCP form completed, and original provided to patient,  with copies uploaded to [facility] and placed on chart. Patient with no other needs or  concerns at present time."  I d/w the patient about her decision in regards to DMT and she said she is still thinking about it. Current plan is for [facility].   Will try to address ACP before DC. HCP form was completed on 6/09.   As per Palliative Care SW's (Kj Gardner) note: "LMSW met with patient at bedside today and first provided overview of HCP form.  Patient verbalized understanding, and was receptive to completion of form  today. Patient identified her very close family friend (often referred to as  her "sister") Anh Hui, [phone], as sole proxy, and did not  identify any alternate. HCP form completed, and original provided to patient,  with copies uploaded to [facility] and placed on chart. Patient with no other needs or  concerns at present time."  I d/w the patient about her decision in regards to DMT and she said she is still thinking about it. Current plan is for [facility].   On 6/10, I d/w the  patient about resuscitation and intubation and the limited chances she has for surviving a cardiac arrest even if CPR is performed. She was going to further think about my inputs and f/u with me on 6/11.  16' were spent in ACP.

## 2020-06-10 NOTE — PROGRESS NOTE ADULT - SUBJECTIVE AND OBJECTIVE BOX
Late note for 6/9/2020 when the patient was seen and examined at 17:30  SUBJECTIVE AND OBJECTIVE: Patient was slightly lethargic but arousable and able to let her needs known. She was still c/o pain over her arm. She used Morphine x 3 over 2 hours, however, she is reluctant to use it any longer since she only feels a mild improvement with it and mainly only  causing sedation. No nausea, or any other complaints.     INTERVAL HPI/OVERNIGHT EVENTS:no acute overnight events.     DNR on chart:   Allergies    No Known Allergies    Intolerances    MEDICATIONS  (STANDING):  enoxaparin Injectable 70 milliGRAM(s) SubCutaneous every 12 hours  FIRST- Mouthwash  BLM 5 milliLiter(s) Swish and Spit four times a day  gabapentin 100 milliGRAM(s) Oral at bedtime  methylPREDNISolone sodium succinate Injectable 40 milliGRAM(s) IV Push daily  multivitamin 1 Tablet(s) Oral daily  pantoprazole  Injectable 40 milliGRAM(s) IV Push every 12 hours  senna 2 Tablet(s) Oral at bedtime    MEDICATIONS  (PRN):  morphine  - Injectable 1.5 milliGRAM(s) IV Push every 2 hours PRN Moderate to Severe pain  ondansetron Injectable 4 milliGRAM(s) IV Push every 6 hours PRN Nausea      ITEMS UNCHECKED ARE NOT PRESENT    PRESENT SYMPTOMS: [ ]Unable to obtain due to poor mentation   Source if other than patient:  [ ]Family   [ ]Team     Pain:  [x ]yes [ ]no  QOL impact - mobility compromised  Location -      RUE, lower back              Aggravating factors - movement  Quality -burning, sharp, tingling   Radiation -down arm, across lower back  Timing-intermittent  Severity (0-10 scale):8  Minimal acceptable level (0-10 scale): says 6 is tolerable    Dyspnea:                           [ ]Mild [ x]Moderate [ ]Severe  Anxiety:                             [ ]Mild [ ]Moderate [ ]Severe  Fatigue:                             [ ]Mild [x ]Moderate [ ]Severe  Nausea:                             [ ]Mild [ ]Moderate [ ]Severe  Loss of appetite:              [ ]Mild [ ]Moderate [ ]Severe  Constipation:                    [ ]Mild [ ]Moderate [ ]Severe    PAIN AD Score:	  [email or web address].[email or web address]/painad.pdf (Ctrl + left click to view)    Other Symptoms:  [x ]All other review of systems negative     Palliative Performance Status Version 2:       40  %      http://npcrc.org/files/news/palliative_performance_scale_[email or web address].pdf  PHYSICAL EXAM:  Vital Signs Last 24 Hrs  T(C): 36.9 (09 Jun 2020 23:53), Max: 37.2 (09 Jun 2020 07:24)  T(F): 98.5 (09 Jun 2020 23:53), Max: 99 (09 Jun 2020 07:24)  HR: 87 (09 Jun 2020 23:53) (87 - 102)  BP: 117/69 (09 Jun 2020 23:53) (117/69 - 124/75)  BP(mean): --  RR: 18 (09 Jun 2020 23:53) (18 - 18)  SpO2: 96% (09 Jun 2020 23:53) (94% - 96%)       GENERAL:  [x ]Alert  [x ]Oriented 3   [x ] Mildly Lethargic  [ ]Cachexia  [ ]Unarousable  [x ]Verbal  [ ]Non-Verbal  Behavioral:   [ ]Anxiety  [ ]Delirium [ ]Agitation [ ]Other  HEENT:  [ ]Normal   [x ]Dry mouth   [ ]ET Tube/Trach  [ ]Oral lesions  PULMONARY:   [ ]Clear [ ]Tachypnea  [ ]Audible excessive secretions   [ ]Rhonchi        [ ]Right [ ]Left [ ]Bilateral  [ x]Crackles        [ ]Right [ ]Left [x ]Bilateral  [ ]Wheezing     [ ]Right [ ]Left [ ]Bilateral  [ ]Diminished BS [ ] Right [ ]Left [ ]Bilateral  CARDIOVASCULAR:    [x ]Regular [ ]Irregular [ ]Tachy  [ ]Abner [ ]Murmur [ ]Other  GASTROINTESTINAL:  [ x]Soft  [ ]Distended   [x ]+BS  [x ]Non tender [ ]Tender  [ ]PEG [ ]OGT/ NGT   Last BM: 6/7  GENITOURINARY:  [ ]Normal [x ]Incontinent   [ ]Oliguria/Anuria   [ ]Birch  MUSCULOSKELETAL:   [ ]Normal   [ x]Weakness  [ x]Bed/Wheelchair bound [ ]Edema  NEUROLOGIC: drowsy at times but non focal  [ x]No focal deficits  [ ] Cognitive impairment  [ ] Dysphagia [ ]Dysarthria [ ] Paresis [ ]Other   SKIN: ecchymoses  [ ]Normal  [ ]Rash   [ ]Pressure ulcer(s) [ ]y [ ]n present on admission    CRITICAL CARE:  [ ]Shock Present  [ ]Septic [ ]Cardiogenic [ ]Neurologic [ ]Hypovolemic  [ ]Vasopressors [ ]Inotropes  [ ]Respiratory failure present [ ]Mechanical Ventilation [ ]Non-invasive ventilatory support [ ]High-Flow  [ ]Acute  [ ]Chronic [ ]Hypoxic  [ ]Hypercarbic [ ]Other  [ ]Other organ failure     LABS:                                      8.1    14.14 )-----------( 511      ( 09 Jun 2020 07:07 )             26.8   06-08    140  |  102  |  12  ----------------------------<  108<H>  3.7   |  26  |  0.52    Ca    8.5      08 Jun 2020 06:33        RADIOLOGY & ADDITIONAL STUDIES: no new imaging studies    Protein Calorie Malnutrition Present: [ ]mild [ x]moderate [ ]severe [ ]underweight [ ]morbid obesity  https://www.andeal.org/vault/2440/web/files/ONC/Table_Clinical%20Characteristics%20to%20Document%20Malnutrition-White%20JV%20et%20al%202012.pdf    Height (cm): 167.6 (06-03-20 @ 20:28), 167.6 (09-17-19 @ 09:00)  Weight (kg): 74.2 (06-03-20 @ 20:28), 71.9 (09-17-19 @ 09:00)  BMI (kg/m2): 26.4 (06-03-20 @ 20:28), 25.6 (09-17-19 @ 09:00)    [ ]PPSV2 < or = 30%  [ ]significant weight loss [x ]poor nutritional intake [ ]anasarca   Albumin, Serum: 2.8 g/dL (06-04-20 @ 05:03)   [ ]Artificial Nutrition    REFERRALS:   [ ]Chaplaincy  [ ]Hospice  [ ]Child Life  [ ]Social Work  [x ]Case management [ ]Holistic Therapy     Goals of Care Document:

## 2020-06-10 NOTE — PROGRESS NOTE ADULT - PROBLEM SELECTOR PLAN 2
May be multifactorial; however, opioids may be playing a role. Will rotated opioids and adjust pain meds as above.   Will start Modafinil 100 mg AM. Improved    Modafinil 100 mg AM.

## 2020-06-10 NOTE — PROGRESS NOTE ADULT - SUBJECTIVE AND OBJECTIVE BOX
This note is still a draft and therefore it is not official yet.   SUBJECTIVE AND OBJECTIVE: Patient was slightly lethargic but arousable and able to let her needs known. She was still c/o pain over her arm. She used Morphine x 3 over 2 hours, however, she is reluctant to use it any longer since she only feels a mild improvement with it and mainly only  causing sedation. No nausea, or any other complaints.     INTERVAL HPI/OVERNIGHT EVENTS:no acute overnight events.     DNR on chart:   Allergies    No Known Allergies    Intolerances    MEDICATIONS  (STANDING):  enoxaparin Injectable 70 milliGRAM(s) SubCutaneous every 12 hours  FIRST- Mouthwash  BLM 5 milliLiter(s) Swish and Spit four times a day  gabapentin 100 milliGRAM(s) Oral three times a day  methylPREDNISolone sodium succinate Injectable 40 milliGRAM(s) IV Push daily  modafinil 100 milliGRAM(s) Oral daily  multivitamin 1 Tablet(s) Oral daily  pantoprazole  Injectable 40 milliGRAM(s) IV Push every 12 hours  senna 2 Tablet(s) Oral at bedtime    MEDICATIONS  (PRN):  HYDROmorphone  Injectable 0.2 milliGRAM(s) IV Push every 2 hours PRN Moderate to severe pain  ondansetron Injectable 4 milliGRAM(s) IV Push every 6 hours PRN Nausea      ITEMS UNCHECKED ARE NOT PRESENT    PRESENT SYMPTOMS: [ ]Unable to obtain due to poor mentation   Source if other than patient:  [ ]Family   [ ]Team     Pain:  [x ]yes [ ]no  QOL impact - mobility compromised  Location -      RUE, lower back              Aggravating factors - movement  Quality -burning, sharp, tingling   Radiation -down arm, across lower back  Timing-intermittent  Severity (0-10 scale):8  Minimal acceptable level (0-10 scale): says 6 is tolerable    Dyspnea:                           [ ]Mild [ x]Moderate [ ]Severe  Anxiety:                             [ ]Mild [ ]Moderate [ ]Severe  Fatigue:                             [ ]Mild [x ]Moderate [ ]Severe  Nausea:                             [ ]Mild [ ]Moderate [ ]Severe  Loss of appetite:              [ ]Mild [ ]Moderate [ ]Severe  Constipation:                    [ ]Mild [ ]Moderate [ ]Severe    PAIN AD Score:	  [email or web address].[email or web address]/painad.pdf (Ctrl + left click to view)    Other Symptoms:  [x ]All other review of systems negative     Palliative Performance Status Version 2:       40  %      http://[email or web address].org/files/news/palliative_performance_scale_[email or web address].pdf  PHYSICAL EXAM:  Vital Signs Last 24 Hrs  T(C): 37.1 (10 Omer 2020 16:00), Max: 37.1 (10 Omer 2020 07:10)  T(F): 98.7 (10 Omer 2020 16:00), Max: 98.7 (10 Omer 2020 07:10)  HR: 112 (10 Omer 2020 16:00) (87 - 112)  BP: 108/65 (10 Omer 2020 16:00) (108/65 - 117/69)  BP(mean): --  RR: 18 (10 Omer 2020 16:00) (18 - 18)  SpO2: 96% (10 Omer 2020 16:00) (95% - 96%)       GENERAL:  [x ]Alert  [x ]Oriented 3   [x ] Mildly Lethargic  [ ]Cachexia  [ ]Unarousable  [x ]Verbal  [ ]Non-Verbal  Behavioral:   [ ]Anxiety  [ ]Delirium [ ]Agitation [ ]Other  HEENT:  [ ]Normal   [x ]Dry mouth   [ ]ET Tube/Trach  [ ]Oral lesions  PULMONARY:   [ ]Clear [ ]Tachypnea  [ ]Audible excessive secretions   [ ]Rhonchi        [ ]Right [ ]Left [ ]Bilateral  [ x]Crackles        [ ]Right [ ]Left [x ]Bilateral  [ ]Wheezing     [ ]Right [ ]Left [ ]Bilateral  [ ]Diminished BS [ ] Right [ ]Left [ ]Bilateral  CARDIOVASCULAR:    [x ]Regular [ ]Irregular [ ]Tachy  [ ]Abner [ ]Murmur [ ]Other  GASTROINTESTINAL:  [ x]Soft  [ ]Distended   [x ]+BS  [x ]Non tender [ ]Tender  [ ]PEG [ ]OGT/ NGT   Last BM: 6/7  GENITOURINARY:  [ ]Normal [x ]Incontinent   [ ]Oliguria/Anuria   [ ]Birch  MUSCULOSKELETAL:   [ ]Normal   [ x]Weakness  [ x]Bed/Wheelchair bound [ ]Edema  NEUROLOGIC: drowsy at times but non focal  [ x]No focal deficits  [ ] Cognitive impairment  [ ] Dysphagia [ ]Dysarthria [ ] Paresis [ ]Other   SKIN: ecchymoses  [ ]Normal  [ ]Rash   [ ]Pressure ulcer(s) [ ]y [ ]n present on admission    CRITICAL CARE:  [ ]Shock Present  [ ]Septic [ ]Cardiogenic [ ]Neurologic [ ]Hypovolemic  [ ]Vasopressors [ ]Inotropes  [ ]Respiratory failure present [ ]Mechanical Ventilation [ ]Non-invasive ventilatory support [ ]High-Flow  [ ]Acute  [ ]Chronic [ ]Hypoxic  [ ]Hypercarbic [ ]Other  [ ]Other organ failure     LABS:                                                        7.6    10.83 )-----------( 516      ( 10 Omer 2020 07:06 )             25.4   33        RADIOLOGY & ADDITIONAL STUDIES: no new imaging studies    Protein Calorie Malnutrition Present: [ ]mild [ x]moderate [ ]severe [ ]underweight [ ]morbid obesity  https://www.andeal.org/vault/2440/web/files/ONC/Table_Clinical%20Characteristics%20to%20Document%20Malnutrition-White%20JV%20et%20al%202012.pdf    Height (cm): 167.6 (06-03-20 @ 20:28), 167.6 (09-17-19 @ 09:00)  Weight (kg): 74.2 (06-03-20 @ 20:28), 71.9 (09-17-19 @ 09:00)  BMI (kg/m2): 26.4 (06-03-20 @ 20:28), 25.6 (09-17-19 @ 09:00)    [ ]PPSV2 < or = 30%  [ ]significant weight loss [x ]poor nutritional intake [ ]anasarca   Albumin, Serum: 2.8 g/dL (06-04-20 @ 05:03)   [ ]Artificial Nutrition    REFERRALS:   [ ]Chaplaincy  [ ]Hospice  [ ]Child Life  [ ]Social Work  [x ]Case management [ ]Holistic Therapy     Goals of Care Document: SUBJECTIVE AND OBJECTIVE: Patient was more alert and indicating her pain had improved. She appeared weak an debilitated.      INTERVAL HPI/OVERNIGHT EVENTS:no acute overnight events.     DNR on chart:   Allergies    No Known Allergies    Intolerances    MEDICATIONS  (STANDING):  enoxaparin Injectable 70 milliGRAM(s) SubCutaneous every 12 hours  FIRST- Mouthwash  BLM 5 milliLiter(s) Swish and Spit four times a day  gabapentin 100 milliGRAM(s) Oral three times a day  methylPREDNISolone sodium succinate Injectable 40 milliGRAM(s) IV Push daily  modafinil 100 milliGRAM(s) Oral daily  multivitamin 1 Tablet(s) Oral daily  pantoprazole  Injectable 40 milliGRAM(s) IV Push every 12 hours  senna 2 Tablet(s) Oral at bedtime    MEDICATIONS  (PRN):  HYDROmorphone  Injectable 0.2 milliGRAM(s) IV Push every 2 hours PRN Moderate to severe pain  ondansetron Injectable 4 milliGRAM(s) IV Push every 6 hours PRN Nausea      ITEMS UNCHECKED ARE NOT PRESENT    PRESENT SYMPTOMS: [ ]Unable to obtain due to poor mentation   Source if other than patient:  [ ]Family   [ ]Team     Pain:  [x ]yes [ ]no  QOL impact - mobility compromised  Location -      RUE, lower back              Aggravating factors - movement  Quality -burning, sharp, tingling   Radiation -down arm, across lower back  Timing-intermittent  Severity (0-10 scale): up to 8/10 and now 6.   Minimal acceptable level (0-10 scale): says 6 is tolerable    Dyspnea:                           [x ]Mild [ ]Moderate [ ]Severe  Anxiety:                             [ ]Mild [ ]Moderate [ ]Severe  Fatigue:                             [ ]Mild [x ]Moderate [ ]Severe  Nausea:                             [ ]Mild [ ]Moderate [ ]Severe  Loss of appetite:              [ ]Mild [ ]Moderate [ ]Severe  Constipation:                    [ ]Mild [ ]Moderate [ ]Severe    PAIN AD Score:	  http://geriatrictoolkit.[place].edu/cog/painad.pdf (Ctrl + left click to view)    Other Symptoms:  [x ]All other review of systems negative     Palliative Performance Status Version 2:       40  %      http://[email or web address]/files/news/palliative_performance_scale_[email or web address].pdf  PHYSICAL EXAM:  Vital Signs Last 24 Hrs  T(C): 37.1 (10 Omer 2020 16:00), Max: 37.1 (10 Omer 2020 07:10)  T(F): 98.7 (10 Omer 2020 16:00), Max: 98.7 (10 Omer 2020 07:10)  HR: 112 (10 Omer 2020 16:00) (87 - 112)  BP: 108/65 (10 Omer 2020 16:00) (108/65 - 117/69)  BP(mean): --  RR: 18 (10 Omer 2020 16:00) (18 - 18)  SpO2: 96% (10 Omer 2020 16:00) (95% - 96%)       GENERAL:  [x ]Alert  [x ]Oriented 3   [ ] Mildly Lethargic  [ ]Cachexia  [ ]Unarousable  [x ]Verbal  [ ]Non-Verbal  Behavioral:   [ ]Anxiety  [ ]Delirium [ ]Agitation [ ]Other  HEENT:  [ ]Normal   [x ]Dry mouth   [ ]ET Tube/Trach  [ ]Oral lesions  PULMONARY:   [ ]Clear [ ]Tachypnea  [ ]Audible excessive secretions   [ ]Rhonchi        [ ]Right [ ]Left [ ]Bilateral  [ x]Crackles        [ ]Right [ ]Left [x ]Bilateral  [ ]Wheezing     [ ]Right [ ]Left [ ]Bilateral  [ ]Diminished BS [ ] Right [ ]Left [ ]Bilateral  CARDIOVASCULAR:    [x ]Regular [ ]Irregular [ ]Tachy  [ ]Abner [ ]Murmur [ ]Other  GASTROINTESTINAL:  [ x]Soft  [ ]Distended   [x ]+BS  [x ]Non tender [ ]Tender  [ ]PEG [ ]OGT/ NGT   Last BM: 6/10  GENITOURINARY:  [ ]Normal [x ]Incontinent   [ ]Oliguria/Anuria   [ ]Birch  MUSCULOSKELETAL:   [ ]Normal   [ x]Weakness  [ x]Bed/Wheelchair bound [ ]Edema  NEUROLOGIC: drowsy at times but non focal  [ x]No focal deficits  [ ] Cognitive impairment  [ ] Dysphagia [ ]Dysarthria [ ] Paresis [ ]Other   SKIN: ecchymoses  [ ]Normal  [ ]Rash   [ ]Pressure ulcer(s) [ ]y [ ]n present on admission [x] Fungating lesion over right breast.     CRITICAL CARE:  [ ]Shock Present  [ ]Septic [ ]Cardiogenic [ ]Neurologic [ ]Hypovolemic  [ ]Vasopressors [ ]Inotropes  [ ]Respiratory failure present [ ]Mechanical Ventilation [ ]Non-invasive ventilatory support [ ]High-Flow  [ ]Acute  [ ]Chronic [ ]Hypoxic  [ ]Hypercarbic [ ]Other  [ ]Other organ failure     LABS:                                                        7.6    10.83 )-----------( 516      ( 10 Omer 2020 07:06 )             25.4   33        RADIOLOGY & ADDITIONAL STUDIES: no new imaging studies    Protein Calorie Malnutrition Present: [ ]mild [ x]moderate [ ]severe [ ]underweight [ ]morbid obesity  https://www.andeal.org/vault/2440/web/files/ONC/Table_Clinical%20Characteristics%20to%20Document%20Malnutrition-White%20JV%20et%20al%202012.pdf    Height (cm): 167.6 (06-03-20 @ 20:28), 167.6 (09-17-19 @ 09:00)  Weight (kg): 74.2 (06-03-20 @ 20:28), 71.9 (09-17-19 @ 09:00)  BMI (kg/m2): 26.4 (06-03-20 @ 20:28), 25.6 (09-17-19 @ 09:00)    [ ]PPSV2 < or = 30%  [ ]significant weight loss [x ]poor nutritional intake [ ]anasarca   Albumin, Serum: 2.8 g/dL (06-04-20 @ 05:03)   [ ]Artificial Nutrition    REFERRALS:   [ ]Chaplaincy  [ ]Hospice  [ ]Child Life  [ ]Social Work  [x ]Case management [ ]Holistic Therapy     Goals of Care Document:

## 2020-06-11 NOTE — PROGRESS NOTE ADULT - PROBLEM SELECTOR PLAN 3
c/o mild dyspnea that she indicated is tolerable.   on solumedrol and O2.   Can also try Hydromorphone PRN for worsening symptoms. Patient is still undecided about what to do next if going for DMT or not. For now, she would like to got to [facility] and then taken it from there.

## 2020-06-11 NOTE — PROGRESS NOTE ADULT - PROBLEM SELECTOR PLAN 4
HCP form was completed on 6/09.   As per Palliative Care SW's (Kj Gardner) note: "LMSW met with patient at bedside today and first provided overview of HCP form.  Patient verbalized understanding, and was receptive to completion of form  today. Patient identified her very close family friend (often referred to as  her "sister") Anh Hui, [phone], as sole proxy, and did not  identify any alternate. HCP form completed, and original provided to patient,  with copies uploaded to [facility] and placed on chart. Patient with no other needs or  concerns at present time."  I d/w the patient about her decision in regards to DMT and she said she is still thinking about it. Current plan is for [facility].   On 6/10, I d/w the  patient about resuscitation and intubation and the limited chances she has for surviving a cardiac arrest even if CPR is performed. She was going to further think about my inputs and f/u with me on 6/11.  16' were spent in ACP. Improved    Modafinil 100 mg AM.

## 2020-06-11 NOTE — PROGRESS NOTE ADULT - SUBJECTIVE AND OBJECTIVE BOX
Patient is a 69y old  Female who presents with a chief complaint of SOB and RUE swelling x one month (10 Omer 2020 17:57)    SUBJECTIVE / OVERNIGHT EVENTS: no acute events overnight     MEDICATIONS  (STANDING):  enoxaparin Injectable 70 milliGRAM(s) SubCutaneous every 12 hours  FIRST- Mouthwash  BLM 5 milliLiter(s) Swish and Spit four times a day  gabapentin 100 milliGRAM(s) Oral three times a day  methylPREDNISolone sodium succinate Injectable 40 milliGRAM(s) IV Push daily  modafinil 100 milliGRAM(s) Oral daily  multivitamin 1 Tablet(s) Oral daily  pantoprazole  Injectable 40 milliGRAM(s) IV Push every 12 hours  senna 2 Tablet(s) Oral at bedtime    MEDICATIONS  (PRN):  HYDROmorphone  Injectable 0.2 milliGRAM(s) IV Push every 2 hours PRN Moderate to severe pain  ondansetron Injectable 4 milliGRAM(s) IV Push every 6 hours PRN Nausea      Vital Signs Last 24 Hrs  T(C): 37.3 (11 Jun 2020 07:54), Max: 37.3 (11 Jun 2020 07:54)  T(F): 99.1 (11 Jun 2020 07:54), Max: 99.1 (11 Jun 2020 07:54)  HR: 100 (11 Jun 2020 07:54) (100 - 112)  BP: 121/78 (11 Jun 2020 07:54) (108/65 - 126/77)  BP(mean): --  RR: 18 (11 Jun 2020 07:54) (18 - 18)  SpO2: 96% (11 Jun 2020 07:54) (96% - 96%)  CAPILLARY BLOOD GLUCOSE        I&O's Summary    10 Omer 2020 07:01  -  11 Jun 2020 07:00  --------------------------------------------------------  IN: 390 mL / OUT: 500 mL / NET: -110 mL    11 Jun 2020 07:01  -  11 Jun 2020 11:51  --------------------------------------------------------  IN: 120 mL / OUT: 0 mL / NET: 120 mL    PHYSICAL EXAM:  GENERAL: NAD  EYES:  conjunctiva and sclera clear  NECK: Supple, No JVD  CHEST/LUNG: Clear to auscultation bilaterally; No wheeze  HEART: +S1/S2, reg   ABDOMEN: Soft, Nontender, Nondistended; Bowel sounds present  EXTREMITIES:  RUE edema  SKIN: fungating R breast mass     LABS:                        7.6    11.37 )-----------( 536      ( 11 Jun 2020 06:55 )             25.0     06-11    139  |  102  |  14  ----------------------------<  101<H>  3.7   |  25  |  0.56    Ca    8.5      11 Jun 2020 06:55

## 2020-06-11 NOTE — PROGRESS NOTE ADULT - PROBLEM SELECTOR PLAN 2
Improved    Modafinil 100 mg AM. c/o mild dyspnea that she indicated is tolerable.   on solumedrol and O2.   Can also try Hydromorphone PRN for worsening symptoms.

## 2020-06-11 NOTE — PROGRESS NOTE ADULT - SUBJECTIVE AND OBJECTIVE BOX
This note is still a draft and therefore it is not official yet.   SUBJECTIVE AND OBJECTIVE: Patient was more alert and indicating her pain had improved. She appeared weak an debilitated.      INTERVAL HPI/OVERNIGHT EVENTS:no acute overnight events.     DNR on chart:   Allergies    No Known Allergies    Intolerances    MEDICATIONS  (STANDING):  enoxaparin Injectable 70 milliGRAM(s) SubCutaneous every 12 hours  FIRST- Mouthwash  BLM 5 milliLiter(s) Swish and Spit four times a day  gabapentin 100 milliGRAM(s) Oral three times a day  modafinil 100 milliGRAM(s) Oral daily  multivitamin 1 Tablet(s) Oral daily  pantoprazole    Tablet 40 milliGRAM(s) Oral before breakfast  senna 2 Tablet(s) Oral at bedtime    MEDICATIONS  (PRN):  oxyCODONE    IR 5 milliGRAM(s) Oral every 4 hours PRN Moderate Pain (4 - 6)      ITEMS UNCHECKED ARE NOT PRESENT    PRESENT SYMPTOMS: [ ]Unable to obtain due to poor mentation   Source if other than patient:  [ ]Family   [ ]Team     Pain:  [x ]yes [ ]no  QOL impact - mobility compromised  Location -      RUE, lower back              Aggravating factors - movement  Quality -burning, sharp, tingling   Radiation -down arm, across lower back  Timing-intermittent  Severity (0-10 scale): up to 8/10 and now 6.   Minimal acceptable level (0-10 scale): says 6 is tolerable    Dyspnea:                           [x ]Mild [ ]Moderate [ ]Severe  Anxiety:                             [ ]Mild [ ]Moderate [ ]Severe  Fatigue:                             [ ]Mild [x ]Moderate [ ]Severe  Nausea:                             [ ]Mild [ ]Moderate [ ]Severe  Loss of appetite:              [ ]Mild [ ]Moderate [ ]Severe  Constipation:                    [ ]Mild [ ]Moderate [ ]Severe    PAIN AD Score:	  [email or web address].[email or web address].[email or web address]/cog/painad.pdf (Ctrl + left click to view)    Other Symptoms:  [x ]All other review of systems negative     Palliative Performance Status Version 2:       40  %      http://npcrc.org/files/news/palliative_performance_scale_ppsv2.pdf  PHYSICAL EXAM:  Vital Signs Last 24 Hrs  T(C): 36.4 (11 Jun 2020 15:36), Max: 37.3 (11 Jun 2020 07:54)  T(F): 97.5 (11 Jun 2020 15:36), Max: 99.1 (11 Jun 2020 07:54)  HR: 112 (11 Jun 2020 15:37) (100 - 112)  BP: 133/73 (11 Jun 2020 15:37) (109/71 - 133/73)  BP(mean): --  RR: 18 (11 Jun 2020 15:37) (18 - 18)  SpO2: 95% (11 Jun 2020 15:37) (95% - 96%)       GENERAL:  [x ]Alert  [x ]Oriented 3   [ ] Mildly Lethargic  [ ]Cachexia  [ ]Unarousable  [x ]Verbal  [ ]Non-Verbal  Behavioral:   [ ]Anxiety  [ ]Delirium [ ]Agitation [ ]Other  HEENT:  [ ]Normal   [x ]Dry mouth   [ ]ET Tube/Trach  [ ]Oral lesions  PULMONARY:   [ ]Clear [ ]Tachypnea  [ ]Audible excessive secretions   [ ]Rhonchi        [ ]Right [ ]Left [ ]Bilateral  [ x]Crackles        [ ]Right [ ]Left [x ]Bilateral  [ ]Wheezing     [ ]Right [ ]Left [ ]Bilateral  [ ]Diminished BS [ ] Right [ ]Left [ ]Bilateral  CARDIOVASCULAR:    [x ]Regular [ ]Irregular [ ]Tachy  [ ]Abner [ ]Murmur [ ]Other  GASTROINTESTINAL:  [ x]Soft  [ ]Distended   [x ]+BS  [x ]Non tender [ ]Tender  [ ]PEG [ ]OGT/ NGT   Last BM: 6/10  GENITOURINARY:  [ ]Normal [x ]Incontinent   [ ]Oliguria/Anuria   [ ]Birch  MUSCULOSKELETAL:   [ ]Normal   [ x]Weakness  [ x]Bed/Wheelchair bound [ ]Edema  NEUROLOGIC: drowsy at times but non focal  [ x]No focal deficits  [ ] Cognitive impairment  [ ] Dysphagia [ ]Dysarthria [ ] Paresis [ ]Other   SKIN: ecchymoses  [ ]Normal  [ ]Rash   [ ]Pressure ulcer(s) [ ]y [ ]n present on admission [x] Fungating lesion over right breast.     CRITICAL CARE:  [ ]Shock Present  [ ]Septic [ ]Cardiogenic [ ]Neurologic [ ]Hypovolemic  [ ]Vasopressors [ ]Inotropes  [ ]Respiratory failure present [ ]Mechanical Ventilation [ ]Non-invasive ventilatory support [ ]High-Flow  [ ]Acute  [ ]Chronic [ ]Hypoxic  [ ]Hypercarbic [ ]Other  [ ]Other organ failure     LABS:                                                 7.6    11.37 )-----------( 536      ( 11 Jun 2020 06:55 )             25.0   06-11    139  |  102  |  14  ----------------------------<  101<H>  3.7   |  25  |  0.56    Ca    8.5      11 Jun 2020 06:55          RADIOLOGY & ADDITIONAL STUDIES: no new imaging studies    Protein Calorie Malnutrition Present: [ ]mild [ x]moderate [ ]severe [ ]underweight [ ]morbid obesity  https://www.andeal.org/vault/2440/web/files/ONC/Table_Clinical%20Characteristics%20to%20Document%20Malnutrition-White%20JV%20et%20al%202012.pdf    Height (cm): 167.6 (06-03-20 @ 20:28), 167.6 (09-17-19 @ 09:00)  Weight (kg): 74.2 (06-03-20 @ 20:28), 71.9 (09-17-19 @ 09:00)  BMI (kg/m2): 26.4 (06-03-20 @ 20:28), 25.6 (09-17-19 @ 09:00)    [ ]PPSV2 < or = 30%  [ ]significant weight loss [x ]poor nutritional intake [ ]anasarca   Albumin, Serum: 2.8 g/dL (06-04-20 @ 05:03)   [ ]Artificial Nutrition    REFERRALS:   [ ]Chaplaincy  [ ]Hospice  [ ]Child Life  [ ]Social Work  [x ]Case management [ ]Holistic Therapy     Goals of Care Document: SUBJECTIVE AND OBJECTIVE: Patient was alert and indicating her pain continues to improve. She continues to appear weak and debilitated.     INTERVAL HPI/OVERNIGHT EVENTS:no acute overnight events.     DNR on chart:   Allergies    No Known Allergies    Intolerances    MEDICATIONS  (STANDING):  enoxaparin Injectable 70 milliGRAM(s) SubCutaneous every 12 hours  FIRST- Mouthwash  BLM 5 milliLiter(s) Swish and Spit four times a day  gabapentin 100 milliGRAM(s) Oral three times a day  modafinil 100 milliGRAM(s) Oral daily  multivitamin 1 Tablet(s) Oral daily  pantoprazole    Tablet 40 milliGRAM(s) Oral before breakfast  senna 2 Tablet(s) Oral at bedtime    MEDICATIONS  (PRN):  oxyCODONE    IR 5 milliGRAM(s) Oral every 4 hours PRN Moderate Pain (4 - 6)      ITEMS UNCHECKED ARE NOT PRESENT    PRESENT SYMPTOMS: [ ]Unable to obtain due to poor mentation   Source if other than patient:  [ ]Family   [ ]Team     Pain:  [x ]yes [ ]no  QOL impact - mobility compromised  Location -      RUE, lower back              Aggravating factors - movement  Quality -burning, sharp, tingling   Radiation -down arm, across lower back  Timing-intermittent  Severity (0-10 scale): up to 8/10 and now no pain   Minimal acceptable level (0-10 scale): says 6 is tolerable    Dyspnea:                           [x ]Mild [ ]Moderate [ ]Severe  Anxiety:                             [ ]Mild [ ]Moderate [ ]Severe  Fatigue:                             [ ]Mild [x ]Moderate [ ]Severe  Nausea:                             [ ]Mild [ ]Moderate [ ]Severe  Loss of appetite:              [ ]Mild [ ]Moderate [ ]Severe  Constipation:                    [ ]Mild [ ]Moderate [ ]Severe    PAIN AD Score:	  http://geriatrictoolkit.[place].[facility]/cog/painad.pdf (Ctrl + left click to view)    Other Symptoms:  [x ]All other review of systems negative     Palliative Performance Status Version 2:       40  %      http://npcrc.org/files/news/palliative_performance_scale_ppsv2.pdf  PHYSICAL EXAM:  Vital Signs Last 24 Hrs  T(C): 36.4 (11 Jun 2020 15:36), Max: 37.3 (11 Jun 2020 07:54)  T(F): 97.5 (11 Jun 2020 15:36), Max: 99.1 (11 Jun 2020 07:54)  HR: 112 (11 Jun 2020 15:37) (100 - 112)  BP: 133/73 (11 Jun 2020 15:37) (109/71 - 133/73)  BP(mean): --  RR: 18 (11 Jun 2020 15:37) (18 - 18)  SpO2: 95% (11 Jun 2020 15:37) (95% - 96%)       GENERAL:  [x ]Alert  [x ]Oriented 3   [ ] Mildly Lethargic  [ ]Cachexia  [ ]Unarousable  [x ]Verbal  [ ]Non-Verbal  Behavioral:   [ ]Anxiety  [ ]Delirium [ ]Agitation [ ]Other  HEENT:  [ ]Normal   [x ]Dry mouth   [ ]ET Tube/Trach  [ ]Oral lesions  PULMONARY:   [ ]Clear [ ]Tachypnea  [ ]Audible excessive secretions   [ ]Rhonchi        [ ]Right [ ]Left [ ]Bilateral  [ x]Crackles        [ ]Right [ ]Left [x ]Bilateral  [ ]Wheezing     [ ]Right [ ]Left [ ]Bilateral  [ ]Diminished BS [ ] Right [ ]Left [ ]Bilateral  CARDIOVASCULAR:    [x ]Regular [ ]Irregular [ ]Tachy  [ ]Abner [ ]Murmur [ ]Other  GASTROINTESTINAL:  [ x]Soft  [ ]Distended   [x ]+BS  [x ]Non tender [ ]Tender  [ ]PEG [ ]OGT/ NGT   Last BM: 6/11  GENITOURINARY:  [ ]Normal [x ]Incontinent   [ ]Oliguria/Anuria   [ ]Birch  MUSCULOSKELETAL:   [ ]Normal   [ x]Weakness  [ x]Bed/Wheelchair bound [ ]Edema  NEUROLOGIC:   [ x]No focal deficits  [ ] Cognitive impairment  [ ] Dysphagia [ ]Dysarthria [ ] Paresis [ ]Other   SKIN: ecchymoses  [ ]Normal  [ ]Rash   [ ]Pressure ulcer(s) [ ]y [ ]n present on admission [x] Fungating lesion over right breast.     CRITICAL CARE:  [ ]Shock Present  [ ]Septic [ ]Cardiogenic [ ]Neurologic [ ]Hypovolemic  [ ]Vasopressors [ ]Inotropes  [ ]Respiratory failure present [ ]Mechanical Ventilation [ ]Non-invasive ventilatory support [ ]High-Flow  [ ]Acute  [ ]Chronic [ ]Hypoxic  [ ]Hypercarbic [ ]Other  [ ]Other organ failure     LABS:                                                 7.6    11.37 )-----------( 536      ( 11 Jun 2020 06:55 )             25.0   06-11    139  |  102  |  14  ----------------------------<  101<H>  3.7   |  25  |  0.56    Ca    8.5      11 Jun 2020 06:55          RADIOLOGY & ADDITIONAL STUDIES: no new imaging studies    Protein Calorie Malnutrition Present: [ ]mild [ x]moderate [ ]severe [ ]underweight [ ]morbid obesity  https://www.andeal.org/vault/2440/web/files/ONC/Table_Clinical%20Characteristics%20to%20Document%20Malnutrition-White%20JV%20et%20al%202012.pdf    Height (cm): 167.6 (06-03-20 @ 20:28), 167.6 (09-17-19 @ 09:00)  Weight (kg): 74.2 (06-03-20 @ 20:28), 71.9 (09-17-19 @ 09:00)  BMI (kg/m2): 26.4 (06-03-20 @ 20:28), 25.6 (09-17-19 @ 09:00)    [ ]PPSV2 < or = 30%  [ ]significant weight loss [x ]poor nutritional intake [ ]anasarca   Albumin, Serum: 2.8 g/dL (06-04-20 @ 05:03)   [ ]Artificial Nutrition    REFERRALS:   [ ]Chaplaincy  [ ]Hospice  [ ]Child Life  [ ]Social Work  [x ]Case management [ ]Holistic Therapy     Goals of Care Document:

## 2020-06-11 NOTE — PROGRESS NOTE ADULT - SUBJECTIVE AND OBJECTIVE BOX
Pt seen, comfortable      MEDICATIONS  (STANDING):  enoxaparin Injectable 70 milliGRAM(s) SubCutaneous every 12 hours  FIRST- Mouthwash  BLM 5 milliLiter(s) Swish and Spit four times a day  gabapentin 100 milliGRAM(s) Oral three times a day  modafinil 100 milliGRAM(s) Oral daily  multivitamin 1 Tablet(s) Oral daily  pantoprazole    Tablet 40 milliGRAM(s) Oral before breakfast  senna 2 Tablet(s) Oral at bedtime    MEDICATIONS  (PRN):  oxyCODONE    IR 5 milliGRAM(s) Oral every 4 hours PRN Moderate Pain (4 - 6)      ROS  No fever, sweats, chills  No epistaxis, HA, sore throat  No CP, SOB, cough, sputum  No n/v/d, abd pain, melena, hematochezia  No edema  No rash  No anxiety  No back pain, joint pain  No bleeding, bruising  No dysuria, hematuria    Vital Signs Last 24 Hrs  T(C): 36.4 (11 Jun 2020 15:36), Max: 37.3 (11 Jun 2020 07:54)  T(F): 97.5 (11 Jun 2020 15:36), Max: 99.1 (11 Jun 2020 07:54)  HR: 112 (11 Jun 2020 15:37) (100 - 112)  BP: 133/73 (11 Jun 2020 15:37) (109/71 - 133/73)  BP(mean): --  RR: 18 (11 Jun 2020 15:37) (18 - 18)  SpO2: 95% (11 Jun 2020 15:37) (95% - 96%)    PE  NAD  Awake, alert  Anicteric, MMM  RRR  CTAB  Abd soft, NT, ND  No c/c/e  No rash grossly  FROM                          7.6    11.37 )-----------( 536      ( 11 Jun 2020 06:55 )             25.0       06-11    139  |  102  |  14  ----------------------------<  101<H>  3.7   |  25  |  0.56    Ca    8.5      11 Jun 2020 06:55

## 2020-06-11 NOTE — PROGRESS NOTE ADULT - PROBLEM SELECTOR PLAN 3
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 Also has small R pleural effusion  -etiology likely multifactorial   -appreciate pulm recs, continue solumedrol 40mg IV daily for lymphangitic spread, will f/u with pulm regarding when to d/c or taper down   -O2 as needed

## 2020-06-11 NOTE — PROGRESS NOTE ADULT - PROBLEM SELECTOR PLAN 5
Will continue to f/u for GOC, ACP, and symptoms.         Cornelio Garcia MD   Geriatrics and Palliative Care (GAP) Consult Service    of Geriatric and Palliative Medicine  [facility]      Please page the following number for clinical matters between the hours of 9 am and 5 pm from Monday through Friday : [phone]    After 5pm and on weekends, please see the contact information below:    In the event of newly developing, evolving, or worsening symptoms, please contact the Palliative Medicine team via pager (if the patient is at [facility] #[phone] or if the patient is at [facility] #[phone]) The Geriatric and Palliative Medicine service has coverage 24 hours a day/ 7 days a week to provide medical recommendations regarding symptom management needs via telephone HCP form was completed on 6/09.   As per Palliative Care SW's (Kj Gardner) note: "LMSW met with patient at bedside today and first provided overview of HCP form.  Patient verbalized understanding, and was receptive to completion of form  today. Patient identified her very close family friend (often referred to as  her "sister") Anh Hui, [phone], as sole proxy, and did not  identify any alternate. HCP form completed, and original provided to patient,  with copies uploaded to [facility] and placed on chart. Patient with no other needs or  concerns at present time."  I d/w the patient about her decision in regards to DMT and she said she is still thinking about it. Current plan is for RACHANA.   On 6/10, I d/w the  patient about resuscitation and intubation and the limited chances she has for surviving a cardiac arrest even if CPR is performed. She was going to further think about my inputs and f/u with me on 6/11.  6/11I f/u with the patient that indicated she does not want to make a decisions in regards to code status at this point therefore she continues to be full code.

## 2020-06-11 NOTE — PROGRESS NOTE ADULT - PROBLEM SELECTOR PLAN 1
Improving.   Continue Neurontin 100 mg tid.   Dilaudid 0.2 mg IV q 2 PRN   Modafinil 100 mg q AM.   Monitoring for sedation and respiratory depression.   Narcan 0.1 mg q 3' up to 3 doses PRN for respiratory depression or sedation due to opioids    Will try to use non opioids such as Gabapentin as an alternative. Will increase it to 100 mg tid. Improving.   Continue Neurontin 100 mg tid.   Dilaudid 0.2 mg IV q 2 PRN can be switched to Dilaudid PO 2 mg q 4 PRN   Modafinil 100 mg q AM.   Monitoring for sedation and respiratory depression.   Narcan 0.1 mg q 3' up to 3 doses PRN for respiratory depression or sedation due to opioids

## 2020-06-12 PROBLEM — I89.0 LYMPHEDEMA, NOT ELSEWHERE CLASSIFIED: Chronic | Status: ACTIVE | Noted: 2020-01-01

## 2020-06-12 NOTE — DISCHARGE NOTE NURSING/CASE MANAGEMENT/SOCIAL WORK - PATIENT PORTAL LINK FT
You can access the FollowMyHealth Patient Portal offered by [facility] by registering at the following website: [email or web address]. By joining [employer]’s FollowMyHealth portal, you will also be able to view your health information using other applications (apps) compatible with our system.

## 2020-06-12 NOTE — PROGRESS NOTE ADULT - PROBLEM SELECTOR PROBLEM 6
Discharge planning issues
Need for prophylactic measure
Palliative care encounter
Palliative care encounter

## 2020-06-12 NOTE — PROGRESS NOTE ADULT - SUBJECTIVE AND OBJECTIVE BOX
Pt seen, lethargic      MEDICATIONS  (STANDING):  enoxaparin Injectable 70 milliGRAM(s) SubCutaneous every 12 hours  FIRST- Mouthwash  BLM 5 milliLiter(s) Swish and Spit four times a day  gabapentin 100 milliGRAM(s) Oral three times a day  modafinil 100 milliGRAM(s) Oral daily  multivitamin 1 Tablet(s) Oral daily  pantoprazole    Tablet 40 milliGRAM(s) Oral before breakfast  senna 2 Tablet(s) Oral at bedtime    MEDICATIONS  (PRN):  oxyCODONE    IR 5 milliGRAM(s) Oral every 4 hours PRN Moderate Pain (4 - 6)      ROS  No fever, sweats, chills  No epistaxis, HA, sore throat  No CP, SOB, cough, sputum  No n/v/d, abd pain, melena, hematochezia  No edema  No rash  No anxiety  No back pain, joint pain  No bleeding, bruising  No dysuria, hematuria    Vital Signs Last 24 Hrs  T(C): 36.4 (11 Jun 2020 15:36), Max: 37.3 (11 Jun 2020 07:54)  T(F): 97.5 (11 Jun 2020 15:36), Max: 99.1 (11 Jun 2020 07:54)  HR: 112 (11 Jun 2020 15:37) (100 - 112)  BP: 133/73 (11 Jun 2020 15:37) (109/71 - 133/73)  BP(mean): --  RR: 18 (11 Jun 2020 15:37) (18 - 18)  SpO2: 95% (11 Jun 2020 15:37) (95% - 96%)    PE  NAD  Awake, alert  Anicteric, MMM  RRR  CTAB  Abd soft, NT, ND  No c/c/e  No rash grossly  FROM                          7.9    10.96 )-----------( 553      ( 12 Jun 2020 07:06 )             26.5                           7.6    11.37 )-----------( 536      ( 11 Jun 2020 06:55 )             25.0       06-11    139  |  102  |  14  ----------------------------<  101<H>  3.7   |  25  |  0.56    Ca    8.5      11 Jun 2020 06:55

## 2020-06-12 NOTE — PROGRESS NOTE ADULT - PROBLEM SELECTOR PROBLEM 1
Pain
Shortness of breath
Stage IV breast cancer in female
Tachypnea
Tachypnea

## 2020-06-12 NOTE — CHART NOTE - NSCHARTNOTEFT_GEN_A_CORE
Patient was medically cleared by Dr. Bailey for discharge to [facility]  with follow-up as advised.

## 2020-06-12 NOTE — CHART NOTE - NSCHARTNOTEFT_GEN_A_CORE
Malnutrition Follow Up     Interim events noted, chart reviewed. Noted plan for RACHANA.    Diet: Diet, Regular:   Supplement Feeding Modality:  Oral  Ensure Enlive Cans or Servings Per Day:  2       Frequency:  Daily (06-09-20 @ 15:45)      Meds/labs reviewed. Wt reviewed, no new wt at this time.     Skin: noted pt c no pressure injuries, previously documented as pressure injuries   Edema: +3 right arm     Source: pt    Pt reports intake and appetite continues to be fair. Reports she has been taking her Ensure Enlive since ordered, had RD open one for breakfast this morning as well. Per flow sheets, pt continues to consume about 50% of her meals. Pt declined any further supplements or food preferences at this time.     Nutrition Diagnosis: Malnutrition addressed c supplements and intake.     Plan:  1. Continue c current diet.   2. Continue w/ Ensure Enlive.   3. Encouraged PO intake-small, frequent meals and nutrient dense snacks. In house, encouraged pt to order nutrient dense snacks c meals to consume in between meals to mimic small, frequent meals. Discussed protein rich foods available on menu. Discussed consuming protein first at meal times and then eating the other foods.     RD remains available.  Holly Abdul MS RD CDN [facility],  #[phone]

## 2020-06-12 NOTE — PROGRESS NOTE ADULT - PROVIDER SPECIALTY LIST ADULT
Hospitalist 89yo M w/PMHx of OA and unspecified valvuloplasty who was BIBEMS after having a witnessed mechanical fall in the street w/o head trauma or LOC, with difficulty ambulating and carrying out tasks of daily living at home alone 89yo M w/PMHx of OA and unspecified valvuloplasty who was BIBEMS after having a witnessed mechanical fall in the street w/o head trauma or LOC, with difficulty ambulating and carrying out tasks of daily living at home alone 89yo M. PMH of OA and unspecified valvuloplasty who was BIBEMS after having a witnessed mechanical fall in the street w/o head trauma or LOC, with difficulty ambulating and carrying out tasks of daily living at home alone 89 y/o M w/

## 2020-06-12 NOTE — PROGRESS NOTE ADULT - PROBLEM SELECTOR PROBLEM 5
"Lizeth Guillory is an 17 year old female with chronic Hepatitis B who returns after a 1 year interval. Lizeth was accompanied her mother.  Since the last evaluation, Lizeth's symptoms have been stable on the prescribed treatment plan. Symptoms currently include: no symptoms are reported. Patient denies: abdominal pain, loss of appetite, nausea, vomiting, diarrhea, and poor growth. She has been eating and growing well. She has not had jaundice or scleral icterus. She has been quite active. She is taking no medications. She is not fatigued.    Interim History:  Emergency Room visits: No  Hospitalizations: No  Surgeries: No  School status: Patient is currently enrolled in school.  School performance is good.  She will be in 9th grade next year.  Family Changes: None    ROS:  A  review of systems was performed and was noncontributory other than as noted above. Past history of anorexia nervosa.    PE:  /63  Pulse 76  Ht 5' 3.05\" (160.1 cm)  Wt 121 lb 14.6 oz (55.3 kg)  BMI 21.56 kg/m2   Con:  Alert, active and in no acute distress.  Abdomen: Soft, nontender, nondistended, no masses. There is no hepatosplenomegaly. No guarding or rebound tenderness.   LYMP: No cervical lymphadenopathy.  MUSC: Extremities: Warm and well perfused. No cyanosis, clubbing or edema.  SKIN: No rashes or jaundice.     Assessment and Plan:    ICD-10-CM    1. Chronic viral hepatitis B without delta agent and without coma (H) B18.1 Comprehensive metabolic panel     Hepatitis Be antigen     Hepatitis Be antibody     Hep B Virus DNA Quant Real Time PCR     AFP tumor marker     CBC with platelets differential     US Abdomen/Pelvis Duplex Complete     Comprehensive metabolic panel     CBC with platelets differential     Hep B Virus DNA Quant Real Time PCR      Lizeth is an 17 year old female with chronic Hepatitis B doing very well. She is currently off treatment and being followed every 12 months. Her liver enzymes have been normal. We " will get labs today.  Lizeth should return to clinic in 1 year unless new problems arise. We will also obtain labs 6 months from now.    Follow up: Return in about 1 year (around 7/5/2018). Please return sooner should Lizeth become symptomatic.      Thank you for allowing me to participate in Lizeth's care. If you have any questions, please contact the nurse line at [phone] (Clare Ozuna RN and Judy Deutsch RN).  If you have scheduling needs, please call the Call Center at [phone].  If you are waiting on stool tests or outside results and do not hear from us after two weeks of testing, please contact us.  Outside results should be faxed to [phone].    Sincerely    Nieves Chau MD   of Pediatrics  Director, Pediatric Gastroenterology, Hepatology and Nutrition  [facility]  Patient Care Team:  Nely Dyer MD as PCP - General (Pediatrics)  MANDY AHN MD    Copy to patient  CRISTINA RUIZ   [id number] [facility] [address] [address]       Fever, unspecified fever cause

## 2020-06-12 NOTE — PROGRESS NOTE ADULT - PROBLEM SELECTOR PLAN 1
-diagnosed in 2018, s/p chemo and radiation treatment which was done 6 months prior   -extensive skin changes on R chest wall   -onc consult  -palliative eval in progress, continue current pain regiment   -poor prognosis   -seems that pt was lost to follow up from previous oncologist  -seen by heme onc, to f/u as an outpatient for palliative chemo  -discussed with Dr. Ahuja (Oncologist, partners with patient's oncologist Dr. Barnes), and there are NO plans for chemotherapy while patient is at [facility]. To f/u as an outpt after discharge from rehab for outpt palliative chemo

## 2020-06-12 NOTE — PROGRESS NOTE ADULT - SUBJECTIVE AND OBJECTIVE BOX
Patient is a 69y old  Female who presents with a chief complaint of SOB and RUE swelling x one month (11 Jun 2020 17:21)    SUBJECTIVE / OVERNIGHT EVENTS: no acute events overnight     MEDICATIONS  (STANDING):  enoxaparin Injectable 70 milliGRAM(s) SubCutaneous every 12 hours  FIRST- Mouthwash  BLM 5 milliLiter(s) Swish and Spit four times a day  gabapentin 100 milliGRAM(s) Oral three times a day  modafinil 100 milliGRAM(s) Oral daily  multivitamin 1 Tablet(s) Oral daily  pantoprazole    Tablet 40 milliGRAM(s) Oral before breakfast  prednisoLONE    3 mG/mL Solution (ORAPRED) 50 milliGRAM(s) Oral daily  senna 2 Tablet(s) Oral at bedtime    MEDICATIONS  (PRN):  oxyCODONE    IR 5 milliGRAM(s) Oral every 4 hours PRN Moderate Pain (4 - 6)      Vital Signs Last 24 Hrs  T(C): 37.3 (12 Jun 2020 07:31), Max: 37.3 (12 Jun 2020 07:31)  T(F): 99.2 (12 Jun 2020 07:31), Max: 99.2 (12 Jun 2020 07:31)  HR: 111 (12 Jun 2020 07:31) (109 - 112)  BP: 128/80 (12 Jun 2020 07:31) (109/71 - 133/73)  BP(mean): --  RR: 18 (12 Jun 2020 07:31) (18 - 18)  SpO2: 94% (12 Jun 2020 07:31) (94% - 97%)  CAPILLARY BLOOD GLUCOSE        I&O's Summary    11 Jun 2020 07:01  -  12 Jun 2020 07:00  --------------------------------------------------------  IN: 320 mL / OUT: 550 mL / NET: -230 mL    12 Jun 2020 07:01  -  12 Jun 2020 11:08  --------------------------------------------------------  IN: 180 mL / OUT: 0 mL / NET: 180 mL    PHYSICAL EXAM:  GENERAL: NAD  HEAD:  Atraumatic, Normocephalic  NECK: Supple, No JVD  CHEST/LUNG: Clear to auscultation bilaterally; No wheeze  HEART: +S1/S2, reg   ABDOMEN: Soft, Nontender  EXTREMITIES: RUE edema   SKIN: Fungating R breast mass     LABS:                        7.9    10.96 )-----------( 553      ( 12 Jun 2020 07:06 )             26.5     06-11    139  |  102  |  14  ----------------------------<  101<H>  3.7   |  25  |  0.56    Ca    8.5      11 Jun 2020 06:55

## 2020-06-12 NOTE — PROGRESS NOTE ADULT - PROBLEM SELECTOR PLAN 6
Transitions of Care Status:  1.  Name of PCP:  2.  PCP Contacted on Admission: [ ] Y    [x ] N    3.  PCP contacted at Discharge: [ ] Y    [ ] N    [ ] N/A  4.  Post-Discharge Appointment Date and Location:  5.  Summary of Handoff given to PCP:
Will continue to f/u for pain.
on full AC as above
Will sign off.   Call us back if needing help with GOC, ACP, or symptoms management.         Cornelio Garcia MD   Geriatrics and Palliative Care (GAP) Consult Service    of Geriatric and Palliative Medicine  [facility]      Please page the following number for clinical matters between the hours of 9 am and 5 pm from Monday through Friday : [phone]    After 5pm and on weekends, please see the contact information below:    In the event of newly developing, evolving, or worsening symptoms, please contact the Palliative Medicine team via pager (if the patient is at [facility] #[phone] or if the patient is at [facility] #[phone]) The Geriatric and Palliative Medicine service has coverage 24 hours a day/ 7 days a week to provide medical recommendations regarding symptom management needs via telephone

## 2020-06-12 NOTE — PROGRESS NOTE ADULT - ASSESSMENT
1. Triple negative breast cancer diagnosed in 2018.      -- was followed by our associate Dr. Quintin Barnes  -- Managed by alternative therapies as per patient's discretion.  She underwent palliative RT to the right breast with Dr. Nathaniel Orr.  She has not followed up for systemic chemotherapy.  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 RUE DVT.     -- On Lovenox 70 Q 12    3. Microcytic Anemia    -- likely related to burden of metastatic cancer;  EGD/Colonoscopy 6/5 noteworthy for diverticulosis  -- check Iron B12/Folate and hemolysis labs  -- transfuse to keep Hgb > 7  -- FOBT Neg    Neil Jerez MD  [phone]
1. Triple negative breast cancer diagnosed in 2018.      -- was followed by our associate Dr. Quintin Barnes  -- Managed by alternative therapies as per patient's discretion.  She underwent palliative RT to the right breast with Dr. Nathaniel Orr.  She has not followed up for systemic chemotherapy.  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 RUE DVT.     -- On Lovenox 70 Q 12    3. Microcytic Anemia    -- likely related to burden of metastatic cancer;  EGD/Colonoscopy 6/5 noteworthy for diverticulosis  -- iron, b12, folate adequate  -- transfuse to keep Hgb > 7  -- FOBT Neg    Thank you for the courtesy of this consultation and we will continue to follow..    Silverio Conrad MD  [facility]  Cell: [phone]
69F w/ R  breast cancer (diagnosed in 2018) s/p chemotherapy and radiation,   anemia, MVP, p/w SOB and RUE swelling
69F w/ R  breast cancer (diagnosed in 2018) s/p chemotherapy and radiation,   anemia, MVP, p/w SOB and RUE swelling, found to have RUE DVT, extensive progression of metastatic disease, fungating breast mass.
69F w/ R  breast cancer (diagnosed in 2018) s/p chemotherapy and radiation,   anemia, MVP, p/w SOB and RUE swelling, found to have RUE DVT:
Patient is a 69 year old female with Metastatic Breast CA s/p chemo and RT, anemia, MVP, presenting with shortness of breath and right arm pain for the past month.  PE was ruled out; however, there is concern for progression of disease and lymphangitic spread. She was also found to have DVT of her RUE and Anemia.
· Assessment		  1. Triple negative breast cancer diagnosed in 2018.      -- was followed by our associate Dr. Quintin Barnes  -- Managed by alternative therapies as per patient's discretion.  She underwent palliative RT to the right breast with Dr. Nathaniel Orr.  She has not followed up for systemic chemotherapy.  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 Barnes after dc    2. RUE DVT.     -- On Lovenox 70 Q 12    3. Microcytic Anemia    -- likely related to burden of metastatic cancer;  EGD/Colonoscopy 6/5 noteworthy for diverticulosis  -- iron, b12, folate adequate    -- FOBT Neg    Ok from onc perspective for rehab. Consider transfusion prior to dc to obtain hgb > 7.5    Thank you for the courtesy of this consultation and we will continue to follow..    Silverio Conrad MD  [facility]  Cell: [phone]
· Assessment		  1. Triple negative breast cancer diagnosed in 2018.      -- was followed by our associate Dr. Qunitin Barnes  -- Managed by alternative therapies as per patient's discretion.  She underwent palliative RT to the right breast with Dr. Nathaniel Orr.  She has not followed up for systemic chemotherapy.  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 Barnes after dc  pending d/c to [facility]     2. RUE DVT.     -- On Lovenox 70 Q 12    3. Microcytic Anemia    -- likely related to burden of metastatic cancer;  EGD/Colonoscopy 6/5 noteworthy for diverticulosis  -- iron, b12, folate adequate    -- FOBT Neg    Ok from onc perspective for rehab. Consider transfusion prior to dc to obtain hgb > 7.5    Arie Wright MD  [facility]  [phone]
· Assessment		  68 yo female known to medical oncologist Dr. Quintin Barnes in [facility] for triple negative breast cancer diagnosed in 2018.  Managed by alternative therapies as per patient's discretion.  She underwent palliative RT to the right breast with Dr. Nathaniel Orr.  She has not followed up for systemic chemotherapy.    now admitted w dyspnea.  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  Noted to have anemia and dark stool.  GI w/u pending.    #ONC  -- will need outpatient PET or CT ab/p to complete extent of disease w/u and f/u with Dr. Barnes for systemic therapy options.    #Anemia-- likely related to burden of metastatic cancer;  EGD/Colonoscopy 6/5 noteworthy for diverticulosis  -transfuse to keep Hgb > 7  -consider capsule endoscopy  -check iron studies/b12/folate levels    #Brachial DVT  -agree to Lovenox;  monitor for GI bleed    #Dyspnea  c/w solumedrol for ?lymphangitic carcinomatosis.  Everett Khan MD  Hematology/Oncology  Cell:  [phone]  Office Phone: [phone]  Office Fax:  [phone] [address] [place] [address]
Patient is a 69 year old female with Metastatic Breast CA s/p chemo and RT, anemia, MVP, presenting with shortness of breath and right arm pain for the past month.  PE was ruled out; however, there is concern for progression of disease and lymphangitic spread. She was also found to have DVT of her RUE and Anemia.

## 2020-06-21 NOTE — H&P ADULT - NSHPREVIEWOFSYSTEMS_GEN_ALL_CORE
CONSTITUTIONAL:+  weakness, no fevers or chills  	EYES/ENT: No visual changes;  No dysphagia  	NECK: No pain or stiffness  	RESPIRATORY: No cough, wheezing, hemoptysis; + shortness of breath  	CARDIOVASCULAR: No chest pain or palpitations; No lower extremity edema  	EXTREMITIES: no le edema, cyanosis, clubbing  	GASTROINTESTINAL: No abdominal or epigastric pain. + nausea,  no vomiting, or hematemesis; No diarrhea or constipation. +  melena  no hematochezia.  	BACK: + mid thoracic  back pain  	GENITOURINARY: No dysuria, frequency or hematuria  	NEUROLOGICAL: No numbness or weakness  	MSK: no joint swelling or pain  	SKIN: No itching, burning, rashes, or lesions   	PSYCH: no agitation  All other review of systems is negative unless indicated above. CONSTITUTIONAL:+  weakness, no fevers or chills  	EYES/ENT: No visual changes;  No dysphagia  	NECK: No pain or stiffness  	RESPIRATORY: No cough, wheezing, hemoptysis; + shortness of breath  	CARDIOVASCULAR: No chest pain or palpitations; No lower extremity edema  	EXTREMITIES: no le edema, cyanosis, clubbing  	GASTROINTESTINAL: No abdominal or epigastric pain. nausea,  no vomiting, or hematemesis; No diarrhea or constipation  no hematochezia.  	BACK: + mid thoracic  back pain  	GENITOURINARY: No dysuria, frequency or hematuria  	NEUROLOGICAL: No numbness or weakness  	MSK: no joint swelling or pain  	SKIN: No itching, burning, rashes, or lesions   	PSYCH: no agitation  All other review of systems is negative unless indicated above.

## 2020-06-21 NOTE — H&P ADULT - PROBLEM SELECTOR PLAN 7
-hgb 6.7 , reported dark stools s/p 1 U pRBCs transfusion  -H&H stable   -continue with PPI 40mg daily  -EGD / colon negative for active bleed DVT : AC as above, SCD  Psych: c/w home modafinil  GI: cw home protonix, Senna bowel regimen  Diet: NPO while on BIPAP, MVI

## 2020-06-21 NOTE — ED ADULT NURSE REASSESSMENT NOTE - NS ED NURSE REASSESS COMMENT FT1
Patient reports feeling slight better since being placed on Bipap. Breathing less labored and chest rise symmetrical.

## 2020-06-21 NOTE — ED ADULT NURSE NOTE - OBJECTIVE STATEMENT
69 year old female presents to the ED by EMS from [facility].  She had a mastectomy and was recovering at rehab.  Today became more lethargic and tachypenic.  She is sleepy, but arouses and answers questions appropriately.  She c/o pain in the right arm.  She has a known DVT in the right arm with swelling to the right arm.  She denies: chest pain, nausea, vomiting.

## 2020-06-21 NOTE — H&P ADULT - PROBLEM SELECTOR PLAN 6
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 Also has small R pleural effusion  -etiology likely multifactorial   -appreciate pulm recs, continue solumedrol 40mg IV daily for lymphangitic spread, will f/u with pulm regarding when to d/c or taper down   -O2 as needed - Hold therapeutic LVX in setting of possible thora  - monitor for compartment syndrome - Hold therapeutic LVX in setting of possible thoracentesis  - start heparin gtt given benefit of DVT ppx outweight risk of hemorrhagic transformation of brain metastasis  - No bolus of heparin  - monitor for compartment syndrome, wrist splint removed.

## 2020-06-21 NOTE — ED PROVIDER NOTE - NS ED ROS FT
ROS:  GENERAL: No fever, no chills  EYES: no change in vision  HEENT: no trouble swallowing, no trouble speaking  CARDIAC: no chest pain  PULMONARY: no cough, +shortness of breath  GI: no abdominal pain, no nausea, no vomiting, no diarrhea, no constipation  : No dysuria, no frequency, no change in appearance, or odor of urine  SKIN: no rashes  NEURO: no headache, +weakness  MSK: rue pain and swelling   ~Modesto Pickett D.O. -Resident

## 2020-06-21 NOTE — H&P ADULT - ATTENDING COMMENTS
Dr. Christine Mares  Division of Hospital Medicine  [facility]   Pager: [phone]    Patient seen and examined today on 6/21/20 with team 7 residents. Agree with above findings, assessment and plan with following additions/exceptions:    Overall, 69 F with PMH of breast ca s/p chemo+RT with extensive metastatic disease and lymphangitic spread c/b  fungating breast mass as sequelae from radiation therapy and recently diagnosed RUE DVT on therapeutic lovenox on last admission earlier this month to [facility] returning with acute hypoxic respiratory failure meeting SIRS criteria on admission. imaging in ED with new brain met, compression fracture. CT Angio chest neg for PE, but showing worsening loculated pleural effusion.    on exam, lethargic and AO x 1 which is changed from residents exam in the ED earlier. breathing comfortably on BIPAP with crackles b/l R>L. RIGHT breast with fungating mass with areas of healing granulation tissue +warmth, no edema. RUE with extensive swelling to level of shoulder.     -maintain on BIPAP overnight, can likely wean to venti mask or NC tomorrow.   -CTA neg for PE, but compared to prior admission her R pleural effusion is considerable worse. pulm consult for diagnostic and therapeutic thora. suspect malignant pleural effusion, may benefit from a pleurX for symptom management eventually in the future pending GOC conversations.  -appreciate neurosurgery consult; steroids for vasogenic edema as per NSGY, transition lovenox to hep gtt w/o bolus with repeat CT head when therapeutic. q4h neurochecks.  -meets SIRS criteria on admission, but afebrile here and no clear source of infection and actually overall her WBC is improved compared to prior admission. f/u infectious work-up as above. monitor abx for now.    overall prognosis guarded.    made DNR/DNI in the ED by her HCP. will need ongoing GOC conversations. f/u palliative care consult (were following last admission)    Rest as detailed in resident's note above.

## 2020-06-21 NOTE — H&P ADULT - PROBLEM SELECTOR PLAN 8
blood cultures sent, observe off of abx for now.  mat be secondary to DVT, underlying malignancy Transitions of Care Status:  1.  Name of PCP:  2.  PCP Contacted on Admission: [ ] Y    [x ] N    3.  PCP contacted at Discharge: [ ] Y    [ ] N    [ ] N/A  4.  Post-Discharge Appointment Date and Location:  5.  Summary of Handoff given to PCP:

## 2020-06-21 NOTE — H&P ADULT - PROBLEM SELECTOR PLAN 2
-CT Head WC 6/21:  small hyperdense foci withing the R corona radiata and L temporal lobes with mild edema. T2/ L2 pathologic compression fx.  - Neurosurgery following:  no acute neurosurgical intervention  - Start prednisone 50mg QD   -TLSO brace for comfort  - MRI brain wwo ordered   - Palliative following -CT Head WC 6/21:  small hyperdense foci withing the R corona radiata and L temporal lobes with mild edema. T2/ L2 pathologic compression fx.  - Neurosurgery following:  no acute neurosurgical intervention  - Start prednisone 50mg QD  -TLSO brace for comfort  - MRI brain wwo ordered  - Palliativec consult placed.

## 2020-06-21 NOTE — ED ADULT NURSE REASSESSMENT NOTE - NS ED NURSE REASSESS COMMENT FT1
Patient in stretcher and calm on Bipap opens eyes to verbal questions.  She is in no apparent distress and resting.

## 2020-06-21 NOTE — H&P ADULT - PROBLEM SELECTOR PLAN 3
- large loculated R pleural effusion, and extensive metastatic disease (hepatic, spine)  - Pulm consulted for therapeutic and diagnostic thora. - large loculated R pleural effusion, and extensive metastatic disease (hepatic, spine)  - Pulm consulted for therapeutic and diagnostic thoracentesis

## 2020-06-21 NOTE — H&P ADULT - PROBLEM SELECTOR PLAN 5
-c/w lovenox for AC   -monitor for compartment syndrome -diagnosed in 2018, s/p chemo and radiation treatment which was done 6 months prior   -extensive skin changes on R chest wall, now metastatic spread to spine, liver, brain, lung  -seems that pt was lost to follow up from previous oncologist  -seen by [facility] onc, to f/u as an outpatient for palliative chemo  -discussed with Dr. Ahuja (Oncologist, partners with patient's oncologist Dr. Barnes), and there are NO plans for chemotherapy while patient is at [facility]. To f/u as an outpt after discharge from rehab for outpt palliative chemo  - Palliative consulted:  -  c/w home oxy PRN, gabapentin, and magic mouthwash for pain  - Wound care: Bacitracin/silver sulfazidine  - DNR status -diagnosed in 2018, s/p chemo and radiation treatment now w/ now metastatic spread to spine, liver, brain, lung and extensive skin changes on R chest wall.   - Palliative consulted for GOC discussion  -  c/w home oxy PRN, gabapentin, and magic mouthwash for pain  - Wound care: Bacitracin/silver sulfazidine  - DNR status

## 2020-06-21 NOTE — H&P ADULT - PROBLEM SELECTOR PLAN 1
- pt w/ acute respitatory failure likely in setting of malignant pleural effusion. Also c/f PE or pericardial effusion  - EKG NSR w/o ischemic changes. Low voltage, but unchanged since last month. Trop negative x 2  - CT chest WC wo PE.   - Follow up echo   - Management of pleural effusion as below - pt w/ acute respitatory failure likely in setting of malignant pleural effusion. Also c/f PE or pericardial effusion. Now on bipap for IWOB  - EKG NSR w/o ischemic changes. Low voltage, but unchanged since last month. Trop negative x 2  - CT chest WC wo PE.   - Follow up echo  - Management of pleural effusion as below

## 2020-06-21 NOTE — ED PROVIDER NOTE - PHYSICAL EXAMINATION
Physical Exam:  Gen: lethargic and toxic appearing  Head: normal appearing  HEENT: normal conjunctiva, oral mucosa dry  Lung:  tachypnea with mild respiratory distress, speaking in partial sentences, crackles at bases bilaterally   CV: tachycardia, right sided chest mass along breast tissue  Abd: soft, ND, NT  MSK: RUE swelling and in splint pain with rom  Neuro: No focal deficits  Skin: Warm  ~Modesto Pickett D.O. -Resident Physical Exam:  Gen: lethargic and toxic appearing  Head: normal appearing  HEENT: normal conjunctiva, oral mucosa dry  Lung:  tachypnea with mild respiratory distress, speaking in partial sentences, crackles at bases bilaterally   CV: tachycardia, right sided chest mass along breast tissue  Abd: soft, ND, NT  MSK: RUE swelling and in splint pain with rom  Neuro: No focal deficits  Skin: Warm  ~Modesto Pickett D.O. -Resident  Attending Nadine Paula: Gen: illappearing, heent: atrauamtic, eomi, perrla, mmm,, neck; nttp, no nuchal rigidity, chest: draining right breast mass, no crepitus, cv: rrr, , lungs:  breath sounds on right, abd: soft, nontender, nondistended, no peritoneal signs, +BS, no guarding, ext:rue swelling , skin: no rash, neuro: awake and alert, following commands

## 2020-06-21 NOTE — ED PROVIDER NOTE - ATTENDING CONTRIBUTION TO CARE
Attending MD Nadine Paula:  I personally have seen and examined this patient.  Resident note reviewed and agree on plan of care and except where noted.  See HPI, PE, and MDM for details.

## 2020-06-21 NOTE — H&P ADULT - ASSESSMENT
69F w/ R  breast cancer (diagnosed in 2018) s/p chemotherapy and radiation, now extensive progression of metastatic disease, fungating breast mass,  anemia, MVP, p/w SOB and is found to have hypoxemic respiratory failure in setting of large pleural effusion and possible pericardial effusion. 69F w/ R  breast cancer (diagnosed in 2018) s/p chemotherapy and radiation, now extensive progression of metastatic disease, fungating breast mass,  anemia, MVP, p/w SOB and is found to have hypoxemic respiratory failure in setting of large pleural effusion. Now on BIPAP 12/5 for hypoxemia and IWOB. 69F w/ R  breast cancer (diagnosed in 2018) s/p chemotherapy and radiation, now extensive progression of metastatic disease, fungating breast mass,  anemia, MVP, p/w SOB and is found to have hypoxemic respiratory failure in setting of large pleural effusion. Now on BIPAP 12/5 for IWOB.

## 2020-06-21 NOTE — ED ADULT NURSE NOTE - INTERVENTIONS DEFINITIONS
Stretcher in lowest position, wheels locked, appropriate side rails in place/Provide visual cue, wrist band, yellow gown, etc./Non-slip footwear when patient is off stretcher/Physically safe environment: no spills, clutter or unnecessary equipment

## 2020-06-21 NOTE — H&P ADULT - NSHPPHYSICALEXAM_GEN_ALL_CORE
ICU Vital Signs Last 24 Hrs  T(C): 37.3 (21 Jun 2020 11:28), Max: 37.3 (21 Jun 2020 11:28)  T(F): 99.2 (21 Jun 2020 11:28), Max: 99.2 (21 Jun 2020 11:28)  HR: 108 (21 Jun 2020 15:00) (105 - 120)  BP: 117/87 (21 Jun 2020 15:00) (117/87 - 153/89)  BP(mean): 97 (21 Jun 2020 14:44) (97 - 97)  ABP: --  ABP(mean): --  RR: 28 (21 Jun 2020 15:00) (20 - 34)  SpO2: 99% (21 Jun 2020 15:00) (97% - 100%)  ____________________  PHYSICAL EXAM:  · CONSTITUTIONAL:  Lethargic, toxic appearing  · NECK:	No bruits; no thyromegaly. + JVD  ·RESPIRATORY: On BIPAP. Bibasilar crackles  · CARDIOVASCULAR: Tachycardia. No murmurs, rubs, gallops. R sided chest mass.  . GASTROINTESTINAL:  Soft, non tender. No organomegaly. No guarding.  · EXTREMITIES:RUE swelling and in splint pain with rom  ·NEUROLOGICAL:   alert and oriented x 3; Moves all four extremities on command. No sensory deficits.   · SKIN:	No lesions; no rash  . LYMPH NODES:	No lymphoadenopathy  · MUSCULOSKELETAL:   No calf tenderness  no joint swelling ICU Vital Signs Last 24 Hrs  T(C): 37.3 (21 Jun 2020 11:28), Max: 37.3 (21 Jun 2020 11:28)  T(F): 99.2 (21 Jun 2020 11:28), Max: 99.2 (21 Jun 2020 11:28)  HR: 108 (21 Jun 2020 15:00) (105 - 120)  BP: 117/87 (21 Jun 2020 15:00) (117/87 - 153/89)  BP(mean): 97 (21 Jun 2020 14:44) (97 - 97)  ABP: --  ABP(mean): --  RR: 28 (21 Jun 2020 15:00) (20 - 34)  SpO2: 99% (21 Jun 2020 15:00) (97% - 100%)  ____________________  PHYSICAL EXAM:  · CONSTITUTIONAL:  Lethargic, toxic appearing  · NECK:	No bruits; no thyromegaly. + JVD  ·RESPIRATORY: On BIPAP. Bibasilar crackles Diminished R sided breath sounds to midlung.   · CARDIOVASCULAR: Tachycardia. No murmurs, rubs, gallops. R sided chest mass.  . GASTROINTESTINAL:  Soft, non tender. No organomegaly. No guarding.  · EXTREMITIES: RUE swelling and in splint pain with rom  ·NEUROLOGICAL:   alert and oriented x 3; Moves all four extremities on command. No sensory deficits.   · SKIN:	No lesions; no rash  . LYMPH NODES:	No lymphoadenopathy  · MUSCULOSKELETAL:   No calf tenderness  no joint swelling ICU Vital Signs Last 24 Hrs  T(C): 37.3 (21 Jun 2020 11:28), Max: 37.3 (21 Jun 2020 11:28)  T(F): 99.2 (21 Jun 2020 11:28), Max: 99.2 (21 Jun 2020 11:28)  HR: 108 (21 Jun 2020 15:00) (105 - 120)  BP: 117/87 (21 Jun 2020 15:00) (117/87 - 153/89)  BP(mean): 97 (21 Jun 2020 14:44) (97 - 97)  ABP: --  ABP(mean): --  RR: 28 (21 Jun 2020 15:00) (20 - 34)  SpO2: 99% (21 Jun 2020 15:00) (97% - 100%)  ____________________  PHYSICAL EXAM:  · CONSTITUTIONAL:  Lethargic, toxic appearing  · NECK:	No bruits; no thyromegaly. JVD not appreciated.   ·RESPIRATORY: On BIPAP. Bibasilar crackles Diminished R sided breath sounds to midlung.   · CARDIOVASCULAR: Tachycardia. No murmurs, rubs, gallops. R sided chest mass. AB pad c/d/i.   . GASTROINTESTINAL:  Soft, non tender. No organomegaly. No guarding.  · EXTREMITIES: RUE swelling and in splint pain with rom  ·NEUROLOGICAL:   alert and oriented x 1; Moves all four extremities on command. No sensory deficits.   · MUSCULOSKELETAL:   No calf tenderness  no joint swelling ICU Vital Signs Last 24 Hrs  T(C): 37.3 (21 Jun 2020 11:28), Max: 37.3 (21 Jun 2020 11:28)  T(F): 99.2 (21 Jun 2020 11:28), Max: 99.2 (21 Jun 2020 11:28)  HR: 108 (21 Jun 2020 15:00) (105 - 120)  BP: 117/87 (21 Jun 2020 15:00) (117/87 - 153/89)  BP(mean): 97 (21 Jun 2020 14:44) (97 - 97)  ABP: --  ABP(mean): --  RR: 28 (21 Jun 2020 15:00) (20 - 34)  SpO2: 99% (21 Jun 2020 15:00) (97% - 100%)  ____________________  PHYSICAL EXAM:  · CONSTITUTIONAL:  Lethargic, toxic appearing  · NECK:	No bruits; no thyromegaly. JVD not appreciated.   ·RESPIRATORY: On BIPAP. Bibasilar crackles Diminished R sided breath sounds to midlung.   · CARDIOVASCULAR: Tachycardia. No murmurs, rubs, gallops. R sided chest mass. AB pad c/d/i. Thickened sclerotic red skin, with punctate lesions showing nonpurulent drainage.   . GASTROINTESTINAL:  Soft, non tender. No organomegaly. No guarding.  · EXTREMITIES: RUE swelling and in splint pain with rom  ·NEUROLOGICAL:   alert and oriented x 1; Moves all four extremities on command. No sensory deficits.   · MUSCULOSKELETAL:   No calf tenderness  no joint swelling

## 2020-06-21 NOTE — ED ADULT NURSE REASSESSMENT NOTE - NS ED NURSE REASSESS COMMENT FT1
Called to give report to 3 Chai, on hold about 10 minutes and unable to give report.  Called back to try and give report.

## 2020-06-21 NOTE — ED PROVIDER NOTE - PROGRESS NOTE DETAILS
Called HCP Anh Hui, [phone], to discuss goc, no answer, will try again spoke with HCP Anh Owens, reports patient didn't disclose much of her healthcare conditions worsening in the last few weeks and feels unsure of goc decisions. discussed possibility of acute decline in mental status and condition and possibility of patient dying or having a cardiac arrest. discussed worsening disease burden and anh feels as if chapo would want to go peacefully at the end of her life. she is unsure of goc at this point in time but will give callback to discuss further spoke with HCP Anh Owens, would like to make patient dnr/dni. molst form filled out and in chart. witnessed by dr cole. milind will come to er to see patient. informed by rads new brain mets with vasogenic edema no shift. nsgy consulted Attending Nadine Paula: pt more comfortable on bipap. will consult palliative. neurosurgery consulted for concern for met to the brain. consider possible decardon. pt with sig swelling to right arm. ext feels warm. limited exam based on mental status.

## 2020-06-21 NOTE — ED ADULT NURSE REASSESSMENT NOTE - NS ED NURSE REASSESS COMMENT FT1
Patient appears more lethargic.  Dr. Pickett aware and went to the bedside to evaluate patient.  Patient answering questions appropriately but is more lethargic.

## 2020-06-21 NOTE — CONSULT NOTE ADULT - ASSESSMENT
DA CRISTINA  69F w/ pmh of MVP, DVT on lovenox, triple neg stage 4 breast cancer dx'ed in 2018 s/p chemo and palliative RT to R breast, with planned palliative chemo as an out patient pw SOB found to have a large loculated R pleural effusion, and extensive metastatic disease (hepatic, spine). Just made DNR/DNI. Palliative on board.  Imaging: small hyperdense foci withing the R corona radiata and L temporal lobes with mild edema. T2/ L2 pathologic compression fx.  Exam: awake, with O2 mask on, EOMI, no facial, RUE swollen but HG 4/5, LUE at least AG, b/l LE Ag.   - no acute neurosurgical intervention  -TLSO brace for comfort  -MRI brain wwo if further treatment is pursued DA CRISTINA  69F w/ pmh of MVP, DVT on lovenox, triple neg stage 4 breast cancer dx'ed in 2018 s/p chemo and palliative RT to R breast, with planned palliative chemo as an out patient pw SOB found to have a large loculated R pleural effusion, and extensive metastatic disease (hepatic, spine). Just made DNR/DNI. Palliative on board.  Imaging: small hyperdense foci withing the R corona radiata and L temporal lobes with mild edema. T2/ L2 pathologic compression fx.  Exam: awake, with O2 mask on, EOMI, no facial, RUE swollen but HG 4/5, LUE at least AG, b/l LE Ag.   - no acute neurosurgical intervention  -Please obtain a TLSO brace. Please contact justin mason for a fitted brace: [phone]  -MRI brain wwo if further treatment is pursued    While there is no absolute neurosurgical contraindication to systemic anti coagulation, there does exist an increased risk of intracranial hemorrhage which can result in significant morbidity including but not limited to headache, seizure, stroke, paralysis, and in severe cases even death.    The risks and benefits of starting systemic anticoagulation must be considered carefully in the setting of the patients medical history and current clinical condition.    If AC is started for treatment of RUE DVT.  - Recommend NO bolus with low ptt goal of ~60-65  - Recommend obtaining follow up CTH after patient is therapeutic and to notify neurosurgery immediately with any changes in the patients neurologic exam

## 2020-06-21 NOTE — H&P ADULT - PROBLEM SELECTOR PLAN 4
-diagnosed in 2018, s/p chemo and radiation treatment which was done 6 months prior   -extensive skin changes on R chest wall   -onc consult  -palliative eval in progress, continue current pain regiment   -poor prognosis   -seems that pt was lost to follow up from previous oncologist  -seen by heme onc, to f/u as an outpatient for palliative chemo  -discussed with Dr. Ahuja (Oncologist, partners with patient's oncologist Dr. Barnes), and there are NO plans for chemotherapy while patient is at [facility]. To f/u as an outpt after discharge from rehab for outpt palliative chemo - Tachycardia, low grade fever, tacypnea, toxic appearance c/f infection vs malignancy.   - s/p vanc/cefepime in ED 6/21  - Will monitor off antibiotics for now   - BCX 6/21 NGTD   - UCX NGTD - Tachycardia, tachypnea, leukocytosis, toxic appearance c/f infection vs malignancy.  - Possible etiology of infxn including fungating breast mass, aspiration event  - s/p vanc/cefepime in ED 6/21  - Will monitor off antibiotics for now, has not had fevers in hospital.  - BCX 6/21 NGTD   - UCX NGTD  - f/u procal

## 2020-06-21 NOTE — ED PROVIDER NOTE - CLINICAL SUMMARY MEDICAL DECISION MAKING FREE TEXT BOX
70yo f Metastatic Breast CA s/p chemo and RT, anemia, MVP, DVT of her RUE, anemia, pw shortness of breath. recently dced from the hospital 6/12. now pw worsening shortness of breath. will reeval for pe, ivc flat on pocus will give fluids concern for pe vs pna vs chf, no leg swelling but with jvd on exam will get labs, cta, reassess for more fluids, abx admit for further eval mgmt 70yo f Metastatic Breast CA s/p chemo and RT, anemia, MVP, DVT of her RUE, anemia, pw shortness of breath. recently dced from the hospital 6/12. now pw worsening shortness of breath. will reeval for pe, ivc flat on pocus will give fluids concern for pe vs pna vs chf, no leg swelling but with jvd on exam will get labs, cta, reassess for more fluids, abx admit for further eval mgmt  Attending Nadine Paula: 70 y/o female with metastatic breast cancer, dvt with lymphedema to RUE presenting with concern for increased sob. upon arrival pt ill appearing, tachypnic with increased wob. IVC with respiratory variation however pt with diffuse B lines. unclear whether B lines from infectious etiology vs malignant spread vs pulmonary edema. given small volume of fluids and broad spectrum abx as concern for possible infectious etiology. with increased wob and distress pt placed on bipap with improvement. called health care proxy to discuss case and consult to palliative, who were involved in care last time. CTA performed showing no PE but worsening metastatic disease and pleural effusion. pt criticially ill.

## 2020-06-21 NOTE — H&P ADULT - NSHPLABSRESULTS_GEN_ALL_CORE
7.6      9.45  )-----------( 277      ( 2020 11:38 )               25.4                 142  |  104  |  14    ----------------------------<  114<H>    4.1   |  27  |  0.46<L>        Ca    8.8      2020 11:35        TPro  5.1<L>  /  Alb  2.7<L>  /  TBili  0.2  /  DBili  x   /  AST  56<H>  /  ALT  15  /  AlkPhos  138<H>          PT/INR - ( 2020 11:38 )   PT: 14.2 sec;   INR: 1.24 ratio               PTT - ( 2020 11:38 )  PTT:28.9 sec    CARDIAC MARKERS ( 2020 11:35 )    x     / x     / 282 U/L / x     / x                Urinalysis Basic - ( 2020 12:17 )        Color: Yellow / Appearance: Clear / S.022 / pH: x    Gluc: x / Ketone: Small  / Bili: Negative / Urobili: Negative     Blood: x / Protein: 30 mg/dL / Nitrite: Negative     Leuk Esterase: Negative / RBC: 11 /hpf / WBC 3 /HPF     Sq Epi: x / Non Sq Epi: 1 /hpf / Bacteria: Negative    < from: CT Head No Cont (20 @ 14:07) >    IMPRESSION:     Small slightly hyperdense foci within the right corona radiata and left temporal lobes with surrounding vasogenic edema concerning for metastasis, possibly hemorrhagic. Cavernoma considered as well. No significant mass effect or midline shift. Recommend MRI brain with IV contrast for further evaluation.    < end of copied text >    < from: CT Angio Chest w/ IV Cont (20 @ 14:07) >      IMPRESSION:   No pulmonary embolism.    A large loculated right pleural effusion, increased from small on 2020, with complete atelectasis of the right lower lobe and partial atelectasis of the right upper and middle lobes. A new small left pleural effusion.    Recurrent invasive right breast cancer with extensive metastatic disease involving the right chest wall soft tissues and musculature. Also extensive metastases above and below the diaphragm including lymph node metastases and hepatic metastases.    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    Interval increase in partially imaged right upper extremity edema since prior.    Osseous metastatic disease with mild worsening of T2 and L2 pathologic compression deformities since 2020.    < end of copied text > 7.6      9.45  )-----------( 277      ( 2020 11:38 )               25.4                 142  |  104  |  14    ----------------------------<  114<H>    4.1   |  27  |  0.46<L>        Ca    8.8      2020 11:35        TPro  5.1<L>  /  Alb  2.7<L>  /  TBili  0.2  /  DBili  x   /  AST  56<H>  /  ALT  15  /  AlkPhos  138<H>          PT/INR - ( 2020 11:38 )   PT: 14.2 sec;   INR: 1.24 ratio               PTT - ( 2020 11:38 )  PTT:28.9 sec    CARDIAC MARKERS ( 2020 11:35 )    x     / x     / 282 U/L / x     / x                Urinalysis Basic - ( 2020 12:17 )        Color: Yellow / Appearance: Clear / S.022 / pH: x    Gluc: x / Ketone: Small  / Bili: Negative / Urobili: Negative     Blood: x / Protein: 30 mg/dL / Nitrite: Negative     Leuk Esterase: Negative / RBC: 11 /hpf / WBC 3 /HPF     Sq Epi: x / Non Sq Epi: 1 /hpf / Bacteria: Negative    < from: CT Head No Cont (20 @ 14:07) >    IMPRESSION:     Small slightly hyperdense foci within the right corona radiata and left temporal lobes with surrounding vasogenic edema concerning for metastasis, possibly hemorrhagic. Cavernoma considered as well. No significant mass effect or midline shift. Recommend MRI brain with IV contrast for further evaluation.    < end of copied text >    < from: CT Angio Chest w/ IV Cont (20 @ 14:07) >      IMPRESSION:   No pulmonary embolism.    A large loculated right pleural effusion, increased from small on 2020, with complete atelectasis of the right lower lobe and partial atelectasis of the right upper and middle lobes. A new small left pleural effusion.    Recurrent invasive right breast cancer with extensive metastatic disease involving the right chest wall soft tissues and musculature. Also extensive metastases above and below the diaphragm including lymph node metastases and hepatic metastases.    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    Interval increase in partially imaged right upper extremity edema since prior.    Osseous metastatic disease with mild worsening of T2 and L2 pathologic compression deformities since 2020.    < end of copied text >      EKG  (Personal read: ) NSR. 7.6      9.45  )-----------( 277      ( 2020 11:38 )               25.4                 142  |  104  |  14    ----------------------------<  114<H>    4.1   |  27  |  0.46<L>        Ca    8.8      2020 11:35        TPro  5.1<L>  /  Alb  2.7<L>  /  TBili  0.2  /  DBili  x   /  AST  56<H>  /  ALT  15  /  AlkPhos  138<H>          PT/INR - ( 2020 11:38 )   PT: 14.2 sec;   INR: 1.24 ratio               PTT - ( 2020 11:38 )  PTT:28.9 sec    CARDIAC MARKERS ( 2020 11:35 )    x     / x     / 282 U/L / x     / x                Urinalysis Basic - ( 2020 12:17 )        Color: Yellow / Appearance: Clear / S.022 / pH: x    Gluc: x / Ketone: Small  / Bili: Negative / Urobili: Negative     Blood: x / Protein: 30 mg/dL / Nitrite: Negative     Leuk Esterase: Negative / RBC: 11 /hpf / WBC 3 /HPF     Sq Epi: x / Non Sq Epi: 1 /hpf / Bacteria: Negative    < from: CT Head No Cont (20 @ 14:07) >    IMPRESSION:     Small slightly hyperdense foci within the right corona radiata and left temporal lobes with surrounding vasogenic edema concerning for metastasis, possibly hemorrhagic. Cavernoma considered as well. No significant mass effect or midline shift. Recommend MRI brain with IV contrast for further evaluation.    < end of copied text >    < from: CT Angio Chest w/ IV Cont (20 @ 14:07) >      IMPRESSION:   No pulmonary embolism.    A large loculated right pleural effusion, increased from small on 2020, with complete atelectasis of the right lower lobe and partial atelectasis of the right upper and middle lobes. A new small left pleural effusion.    Recurrent invasive right breast cancer with extensive metastatic disease involving the right chest wall soft tissues and musculature. Also extensive metastases above and below the diaphragm including lymph node metastases and hepatic metastases.    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    Interval increase in partially imaged right upper extremity edema since prior.    Osseous metastatic disease with mild worsening of T2 and L2 pathologic compression deformities since 2020.    < end of copied text >      EKG  (Personal read: ) NSR. 7.6    9.45  )-----------( 277      ( 2020 11:38 )               25.4             142  |  104  |  14    ----------------------------<  114<H>    4.1   |  27  |  0.46<L>        Ca    8.8      2020 11:35        TPro  5.1<L>  /  Alb  2.7<L>  /  TBili  0.2  /  DBili  x   /  AST  56<H>  /  ALT  15  /  AlkPhos  138<H>          PT/INR - ( 2020 11:38 )   PT: 14.2 sec;   INR: 1.24 ratio               PTT - ( 2020 11:38 )  PTT:28.9 sec    CARDIAC MARKERS ( 2020 11:35 )    x     / x     / 282 U/L / x     / x                Urinalysis Basic - ( 2020 12:17 )        Color: Yellow / Appearance: Clear / S.022 / pH: x    Gluc: x / Ketone: Small  / Bili: Negative / Urobili: Negative     Blood: x / Protein: 30 mg/dL / Nitrite: Negative     Leuk Esterase: Negative / RBC: 11 /hpf / WBC 3 /HPF     Sq Epi: x / Non Sq Epi: 1 /hpf / Bacteria: Negative    < from: CT Head No Cont (20 @ 14:07) >    IMPRESSION:     Small slightly hyperdense foci within the right corona radiata and left temporal lobes with surrounding vasogenic edema concerning for metastasis, possibly hemorrhagic. Cavernoma considered as well. No significant mass effect or midline shift. Recommend MRI brain with IV contrast for further evaluation.    < end of copied text >    < from: CT Angio Chest w/ IV Cont (20 @ 14:07) >      IMPRESSION:   No pulmonary embolism.    A large loculated right pleural effusion, increased from small on 2020, with complete atelectasis of the right lower lobe and partial atelectasis of the right upper and middle lobes. A new small left pleural effusion.    Recurrent invasive right breast cancer with extensive metastatic disease involving the right chest wall soft tissues and musculature. Also extensive metastases above and below the diaphragm including lymph node metastases and hepatic metastases.    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    Interval increase in partially imaged right upper extremity edema since prior.    Osseous metastatic disease with mild worsening of T2 and L2 pathologic compression deformities since 2020.    < end of copied text >      EKG  (Personal read: ) NSR.

## 2020-06-21 NOTE — CONSULT NOTE ADULT - SUBJECTIVE AND OBJECTIVE BOX
p (1480)     HPI:  68yo f Metastatic Breast CA s/p chemo and RT, anemia, MVP, DVT of her RUE, anemia, pw shortness of breath. recently dced from the hospital 6/12. now pw worsening shortness of breath. patient reports swelling of rue and pain worsening but otherwise seems fatigued and pale appearing. Denies recent trauma, fevers, chills, headache, dizziness, nausea, vomiting, dysuria, freq, hematuria, diarrhea, constipation, chest pain, cough.    Imaging:  Small slightly hyperdense foci within the right corona radiata and left   temporal lobes with surrounding vasogenic edema   Exam:  awake, with O2 mask on, EOMI, no facial, RUE swollen but HG 4/5, LUE at least AG, b/l LE Ag.   --Anticoagulation:    =====================  PAST MEDICAL HISTORY   Lymphedema  H/O hemorrhoids  Mitral valve prolapse  Anemia  Breast cancer    PAST SURGICAL HISTORY   H/O inguinal hernia repair  H/O umbilical hernia repair  H/O hemorrhoidectomy        MEDICATIONS:  Antibiotics:    Neuro:    Other:      SOCIAL HISTORY:   Occupation:   Marital Status:     FAMILY HISTORY:  FH: hyperlipidemia  FH: hypertension      ROS: Negative except per HPI    LABS:  PT/INR - ( 21 Jun 2020 11:38 )   PT: 14.2 sec;   INR: 1.24 ratio         PTT - ( 21 Jun 2020 11:38 )  PTT:28.9 sec                        7.6    9.45  )-----------( 277      ( 21 Jun 2020 11:38 )             25.4     06-21    142  |  104  |  14  ----------------------------<  114<H>  4.1   |  27  |  0.46<L>    Ca    8.8      21 Jun 2020 11:35    TPro  5.1<L>  /  Alb  2.7<L>  /  TBili  0.2  /  DBili  x   /  AST  56<H>  /  ALT  15  /  AlkPhos  138<H>  06-21

## 2020-06-21 NOTE — H&P ADULT - HISTORY OF PRESENT ILLNESS
Patient is a 69 year old female with PMH right breast cancer (diagnosed in 2018) s/p chemotherapy and radiation treatment ( last session about 6 months prior to admission),  anemia, MVP, presents for shortness of breath.  . History attained with help of patients sister Anh, who is also her HCP. Patient had breast surgery 1 year ago. Patient does not use o2 at home. Patient weak and with little appetite and decreased po intake. Walked previously with walker, has not been ambulating lately. She began began feeling chronic dyspnea for the past few months, however now feels more SOB than in the past. Denies fevers, chills, night sweats, pain or CP. 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 Patient was pending an outpatient PET scan and follow up with oncology. On previous admission  patient was additionally found to be anemic , had EGD/ Colonoscopy negative on 6/5 recieved 1 unit of blood and was given Lovenox for DVT . SHe typically lives with two brothers and son at home. Typically Ao X 3 able to respond to questions appropriately. GOC disccusion with sister Anh, who made patient DNR / DNI given extensive malignancy.     In ED: 128/83, , RR 30 Temp 98.1. 100% on 3L NC  500cc NS. Vanc 1g, Cepefepime 2g. BIPAP 12/5 MR Head WC and TTE pending. Patient is a 69 year old female with PMH right breast cancer (diagnosed in 2018) s/p chemotherapy and radiation treatment ( last session about 6 months prior to admission),  anemia, MVP, presents for shortness of breath.  . History attained with help of patients sister Anh, who is also her HCP. Patient had breast surgery 1 year ago. Patient does not use o2 at home. Patient weak and with little appetite and decreased po intake. Walked previously with walker, has not been ambulating lately. She began began feeling chronic dyspnea for the past few months, however now feels more SOB than in the past. Denies fevers, chills, night sweats, pain or CP. 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 Patient was pending an outpatient PET scan and follow up with oncology. On previous admission  patient was additionally found to be anemic , had EGD/ Colonoscopy negative on 6/5 recieved 1 unit of blood and was given Lovenox for DVT . SHe typically lives with two brothers and son at home. Typically Ao X 3 able to respond to questions appropriately. C disccusion with sister Anh, who made patient DNR / DNI given extensive malignancy.     In ED: 128/83, , RR 30 Temp 98.1. 100% on 3L NC  500cc NS. Vanc 1g, Cepefepime 2g. BIPAP 12/5 MR Head WC and TTE pending. Patient is a 69 year old female with PMH right breast cancer (diagnosed in 2018) s/p chemotherapy and radiation treatment ( last session about 6 months prior to admission),  anemia, MVP, presents for shortness of breath.  . History attained with help of patients sister Anh, who is also her HCP. Patient had breast surgery 1 year ago. Patient does not use o2 at home. Patient weak and with little appetite and decreased po intake. Walked previously with walker, has not been ambulating lately. She began began feeling chronic dyspnea for the past few months, however now feels more SOB than in the past. Denies fevers, chills, night sweats, pain or CP. 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 Patient was pending an outpatient PET scan and follow up with oncology. On previous admission  patient was additionally found to be anemic , had EGD/ Colonoscopy negative on 6/5 recieved 1 unit of blood and was given Lovenox for DVT . SHe typically lives with two brothers and son at home. Typically Ao X 3 able to respond to questions appropriately. C disccusion with sister Anh, who made patient DNR / DNI given extensive malignancy.     In ED: 128/83, , RR 30 Temp 98.1. 100% on 3L NC  500cc NS. Vanc 1g, Cepefepime 2g. Placed on BIPAP 12/5 for IWOB.

## 2020-06-21 NOTE — H&P ADULT - NSHPSOCIALHISTORY_GEN_ALL_CORE
Lives with: (  ) alone  (  ) children   (  ) spouse   (  ) parents  (x ) Brother    	Substance Use (street drugs): (x  ) never used  (  ) other:  	Tobacco Usage:  (  x ) never smoked, second hand smoke exposure    (   ) former smoker   (   ) current smoker  (     ) pack year  (        ) last cigarette date  Alcohol Usage: no etoh use

## 2020-06-21 NOTE — H&P ADULT - NSICDXPASTMEDICALHX_GEN_ALL_CORE_FT
PAST MEDICAL HISTORY:  Anemia     Breast cancer     Deep vein thrombosis (DVT) RUE on LVX    H/O hemorrhoids     Lymphedema rt arm    Mitral valve prolapse

## 2020-06-21 NOTE — CHART NOTE - NSCHARTNOTEFT_GEN_A_CORE
Arm is extensively swollen at this time and called Anh (sister and HCP) discussing risks vs benefits of Anticoagulation. She expressed understanding that anticoagulating may pose the patient at risk for further brain bleeding and not anticoagulating may further worsen the RUE edema and put the patient at risk for thromboembolism with her already tenuous respiratory status. She agreed to anticoagulate with heparin gtt and to f/u with CT head once therapeutic and to continue with neurological checks q4 hours.     Trina Castillo MD  [facility]   Pager ID #: [phone]   PGY2- Categorical Resident

## 2020-06-21 NOTE — ED PROVIDER NOTE - OBJECTIVE STATEMENT
68yo f Metastatic Breast CA s/p chemo and RT, anemia, MVP, DVT of her RUE, anemia, pw shortness of breath. recently dced from the hospital 6/12. now pw worsening shortness of breath. patient reports swelling of rue and pain worsening but otherwise seems fatigued and pale appearing. Denies recent trauma, fevers, chills, headache, dizziness, nausea, vomiting, dysuria, freq, hematuria, diarrhea, constipation, chest pain, cough.

## 2020-06-21 NOTE — ED PROVIDER NOTE - CARE PLAN
Principal Discharge DX:	Pleural effusion  Secondary Diagnosis:	Respiratory distress  Secondary Diagnosis:	Lymphedema  Secondary Diagnosis:	Brain metastasis

## 2020-06-22 NOTE — CONSULT NOTE ADULT - ASSESSMENT
69 year old female with PMH triple negative right breast cancer (diagnosed in 2018; was receiving regional chemo perfusion / embolization + radiation; no systemic chemotherapy, no surgery), anemia, MVP, presents for shortness of breath admitted with failure to thrive and increased work of breathing requiring BiPAP. Pulmonary consulted in the setting of large R pleural effusion and evaluation for pleural drainage. Etiology of effusion is almost certainly metastatic disease.   -Patient transitioned to NC and appears comfortable and SpO2 is appropriate  -Maintain SpO2 >92%  -Can place back on NIV for increased work of breathing PRN  -Will arrange for pleural drainage today. Will discuss with HCP regarding options including therapeutic thoracentesis vs placing IPC for ongoing drainage (if possible)  -Patient DNR/DNI; palliative previously consulted. hospice evaluation would be appropriate though there was consideration to further therapy during last admission  -Will need to hold heparin gtt prior to procedure    RECS INCOMPLETE--FULL NOTE TO FOLLOW    Mekhi See MD, PGY5  Pulmonary and Critical Care Fellow  [phone] 69 year old female with PMH triple negative right breast cancer (diagnosed in 2018; was receiving regional chemo perfusion / embolization + radiation; no systemic chemotherapy, no surgery), anemia, MVP, presents for shortness of breath admitted with failure to thrive and increased work of breathing requiring BiPAP. Pulmonary consulted in the setting of large R pleural effusion and evaluation for pleural drainage. Etiology of effusion is almost certainly metastatic disease.   -Patient transitioned to NC and appears comfortable and SpO2 is appropriate  -Maintain SpO2 >92%  -Can place back on NIV for increased work of breathing PRN  -Spoke with HCP Anh today ([phone]) who consented for thoracentesis or IPC placement for comfort.   -Evaluated for pleural drainage today for symptom relief. Unfortunately with significant ulcerations, dermatitis and edema IPC placement may not be an option. Thoracentesis will also be challenging but can be done. Have to consider where is best to perform drainage in safe environment. Dr. Garcia to discuss with Anh regarding transfer to PCU. Will continue to follow along in event of transfer or if she remains on the floor. PCU transfer would be appropriate.    Mekhi See MD, PGY5  Pulmonary and Critical Care Fellow  [phone]

## 2020-06-22 NOTE — CONSULT NOTE ADULT - SUBJECTIVE AND OBJECTIVE BOX
Wound Surgery Consult Note:    HPI:  Patient is a 69 year old female with PMH right breast cancer (diagnosed in 2018) s/p chemotherapy and radiation treatment ( last session about 6 months prior to admission),  anemia, MVP, presents for shortness of breath.  . History attained with help of patients sister Anh, who is also her HCP. Patient had breast surgery 1 year ago. Patient does not use o2 at home. Patient weak and with little appetite and decreased po intake. Walked previously with walker, has not been ambulating lately. She began began feeling chronic dyspnea for the past few months, however now feels more SOB than in the past. Denies fevers, chills, night sweats, pain or CP. 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 Patient was pending an outpatient PET scan and follow up with oncology. On previous admission  patient was additionally found to be anemic , had EGD/ Colonoscopy negative on  recieved 1 unit of blood and was given Lovenox for DVT . SHe typically lives with two brothers and son at home. Typically Ao X 3 able to respond to questions appropriately. [facility] disccusion with sister Anh, who made patient DNR / DNI given extensive malignancy.     Request for wound care consult received for chronic fungating Right breast lesion. She is known to the wound care team from prior admission. She is significantly weaker and less alert than during prior admission. She stated that she detects an odor associated with the lesion that bothers her though it was undetectable to this author. Our topical recommendations address this concern with an extended release antimicrobial. Ms. Vasquez was encountered on an alternating air with low air loss surface having just returned from endoscopy. She was seen with Dr. Lomas.    PAST MEDICAL & SURGICAL HISTORY:  Deep vein thrombosis (DVT): RUE on LVX  Lymphedema: rt arm  H/O hemorrhoids  Mitral valve prolapse  Anemia  Breast cancer  H/O inguinal hernia repair  H/O umbilical hernia repair  H/O hemorrhoidectomy    REVIEW OF SYSTEMS  Unable to obtain. Patient barely speaking    MEDICATIONS  (STANDING):  BACItracin   Ointment 1 Application(s) Topical two times a day  FIRST- Mouthwash  BLM 15 milliLiter(s) Swish and Spit three times a day  gabapentin 100 milliGRAM(s) Oral three times a day  heparin  Infusion 13 Unit(s)/Hr (13 mL/Hr) IV Continuous <Continuous>  modafinil 100 milliGRAM(s) Oral daily  Nephro-robina 1 Tablet(s) Oral daily  pantoprazole    Tablet 40 milliGRAM(s) Oral before breakfast  predniSONE   Tablet 50 milliGRAM(s) Oral daily  senna 2 Tablet(s) Oral at bedtime  silver sulfADIAZINE 1% Cream 1 Application(s) Topical daily    MEDICATIONS  (PRN):  dextrose 50% Injectable 50 milliLiter(s) IV Push once PRN hypoglycemia  oxyCODONE    IR 5 milliGRAM(s) Oral every 4 hours PRN Moderate Pain (4 - 6)    Allergies    No Known Allergies    Intolerances    SOCIAL HISTORY:  unable to obtain. patient barely speaking    FAMILY HISTORY:  FH: hyperlipidemia: brother  FH: hypertension: mother, brother    Vital Signs Last 24 Hrs  T(C): 36.4 (2020 04:48), Max: 36.9 (2020 23:05)  T(F): 97.5 (2020 04:48), Max: 98.4 (2020 23:05)  HR: 120 (2020 12:18) (105 - 121)  BP: 143/83 (2020 04:48) (117/87 - 143/83)  BP(mean): 97 (2020 14:44) (97 - 97)  RR: 18 (2020 04:48) (18 - 31)  SpO2: 98% (2020 12:18) (96% - 100%)    Physical Exam:  General: NAD, barely speaking, weak, lethargic  ENMT: right neck with firm nodules palpated  Respiratory: no SOB on room air  Gastrointestinal: soft NT/ND  Neurology: sensation grossly intact  Musculoskeletal: no contractures  Vascular: BLE edema equal, Right Upper extremity with + edema including hand, RUE equally warm, no acute ischemia noted  Skin:  Right breast with an all encompassing firm, nodular mass with scattered ulcerations 100% covered with moist, yellow slough, small amount of serous drainage, firm nodules noted leading from right breast up through right neck, no active drainage, No odor, erythema, increased warmth, tenderness, fluctuance    LABS:      142  |  104  |  14  ----------------------------<  114<H>  4.1   |  27  |  0.46<L>    Ca    8.8      2020 11:35    TPro  5.1<L>  /  Alb  2.7<L>  /  TBili  0.2  /  DBili  x   /  AST  56<H>  /  ALT  15  /  AlkPhos  138<H>                            7.6    9.45  )-----------( 277      ( 2020 11:38 )             25.4     PT/INR - ( 2020 11:38 )   PT: 14.2 sec;   INR: 1.24 ratio         PTT - ( 2020 02:16 )  PTT:63.1 sec  Urinalysis Basic - ( 2020 12:17 )    Color: Yellow / Appearance: Clear / S.022 / pH: x  Gluc: x / Ketone: Small  / Bili: Negative / Urobili: Negative   Blood: x / Protein: 30 mg/dL / Nitrite: Negative   Leuk Esterase: Negative / RBC: 11 /hpf / WBC 3 /HPF   Sq Epi: x / Non Sq Epi: 1 /hpf / Bacteria: Negative        RADIOLOGY & ADDITIONAL STUDIES:    EXAM:  CT ANGIO CHEST (W)AW IC                        PROCEDURE DATE:  2020    INTERPRETATION:  CLINICAL INFORMATION: Shortness of breath with clinical concern for pulmonary embolism.  Recurrent invasive breast cancer.    COMPARISON: CT chest 2020.    PROCEDURE:   CT Angiography of the Chest.  90 ml of Omnipaque 350 was injected intravenously. 10 ml were discarded.  Sagittal and coronal reformats were performed as well as 3D (MIP) reconstructions.    FINDINGS:    LARGE AIRWAYS, LUNGS AND PLEURA: Patent central airways. A large loculated right pleural effusion, increased from small on 2020, with complete atelectasis of the right lower lobe and partial atelectasis of the right upper and middle lobes. Also a small left pleural effusion, new since prior. Bilateral pulmonary nodules, compatible with pulmonary metastases. A reference in the left upper lobe measures 0.2 x 1.4 cm (series 2, image 30), previously 1.9 x 0.9 cm. Irregular interlobular septal thickening on the right is concerning for lymphangitic spread.    MEDIASTINUM AND NATHANIEL: Extensive mediastinal and hilar lymphadenopathy with narrowing of the patent right pulmonary arteries. A reference prevascular node measures 2.7 x 1.8 cm (series 2, image 40), essentially unchanged from prior.  VESSELS: Streak artifact somewhat limits evaluation for pulmonary embolism. No pulmonary embolism.  HEART: Heart size is normal. No pericardial effusion.  CHEST WALL AND LOWER NECK: Profound right breast edema with irregular masslike skin thickening and numerous subcutaneous masses, compatible with known invasive breast cancer. A mass contiguous with the right pectoralis measures 4 x 2.3 cm (series 2, image 60), unchanged from most recent prior. Also extensive masslike nodularity of the right chest wall musculature including the infrascapular region posteriorly, most compatible with metastatic disease. Bilateral axillary lymphadenopathy. A reference right axillary node measures 1.9 x 1.3 cm (series 2, image 46), previously 1.7 x 1.4 cm. A left axillary node measures 3.7 x 2.7 cm (series 2, image 40), previously 3.4 x 2.2 cm. Also left breast skin thickening. Right supraclavicular lymphadenopathy with a right supraclavicular node measuring 4.4 x 3.7 cm (series 2, image 6), previously 4.1 x 3.4 cm. Marked right upper extremity intramuscular edema, increased from 2020  VISUALIZED UPPER ABDOMEN: Anasarca, asymmetric to the right, increased from 2020. Redemonstrated hepatic metastases with a reference confluent anterior hepatic metastasis measuring 6.9 x 4.2 cm (series 2, image 110), previously 6.6 cm when remeasured on prior. Partially imaged confluent retroperitoneal lymphadenopathy.  BONES: Lytic lesions involving T2, T12, L1 and L2 with interval worsening of the pathologic compression deformity of T2. Stable L1 pathologic compression deformity and mild worsening of the pathologic compression deformity of L2.    IMPRESSION:   No pulmonary embolism.    A large loculated right pleural effusion, increased from small on 2020, with complete atelectasis of the right lower lobe and partial atelectasis of the right upper and middle lobes. A new small left pleural effusion.    Recurrent invasive right breast cancer with extensive metastatic disease involving the right chest wall soft tissues and musculature. Also extensive metastases above and below the diaphragm including lymph node metastases and hepatic metastases.    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    Interval increase in partially imaged right upper extremity edema since prior.    Osseous metastatic disease with mild worsening of T2 and L2 pathologic compression deformities since 2020.        EXAM:  DUPLEX EXT VEINS UPPER RT                        PROCEDURE DATE:  2020    INTERPRETATION:  CLINICAL INFORMATION: Right upper extremity swelling and pain.    COMPARISON: None available.    TECHNIQUE: Duplex sonography of the RIGHT UPPER extremity veins with color and spectral Doppler, with and without compression.      FINDINGS:  The right internal jugular, subclavian, and axillary veins are patent and compressible where applicable. Thrombosis of the right brachial vein. The basilic and cephalic veins (superficial veins) were not imaged.    Doppler examination shows normal spontaneous and phasic flow.    IMPRESSION:     Right brachial vein deep venous thrombosis.

## 2020-06-22 NOTE — PROGRESS NOTE ADULT - PROBLEM SELECTOR PLAN 5
-diagnosed in 2018, s/p chemo and radiation treatment now w/ now metastatic spread to spine, liver, brain, lung and extensive skin changes on R chest wall.   - Palliative consulted for GOC discussion  -  c/w home oxy PRN, gabapentin, and magic mouthwash for pain  - Wound care: Bacitracin/silver sulfazidine  - Wound consult  - DNR status -diagnosed in 2018, s/p chemo and radiation treatment now w/ now metastatic spread to spine, liver, brain, lung and extensive skin changes on R chest wall.   - Palliative consulted for GOC discussion  -  c/w home oxy PRN, gabapentin, and magic mouthwash for pain  - Care as per wound care team, will need outpt follow up  - DNR status

## 2020-06-22 NOTE — PROGRESS NOTE ADULT - PROBLEM SELECTOR PLAN 7
DVT : AC as above, SCD  Psych: c/w home modafinil  GI: cw home protonix, Senna bowel regimen  Diet: NPO while on BIPAP, MVI DVT : AC as above, SCD  Psych: c/w home modafinil  GI: cw home protonix, Senna bowel regimen  Diet: NPO pending Bedside dysphagia screen, MVI

## 2020-06-22 NOTE — CHART NOTE - NSCHARTNOTEFT_GEN_A_CORE
Evaluate and measure for TLSO. To be fit and delivered 6/23/20  Sudeep VALADEZ  [facility]  [phone]

## 2020-06-22 NOTE — PROGRESS NOTE ADULT - PROBLEM SELECTOR PLAN 3
- large loculated R pleural effusion, and extensive metastatic disease (hepatic, spine)  - Pulm consulted for therapeutic and diagnostic thoracentesis - large loculated R pleural effusion, and extensive metastatic disease (hepatic, spine)  - Pulm consulted for therapeutic and diagnostic thoracentesis; hold heparin gtt until s/p procedure  - f/u pleural effusion studies

## 2020-06-22 NOTE — CONSULT NOTE ADULT - CONVERSATION DETAILS
d/w the patient's HCP about her current condition and very poor prognosis (likely hours to days). The HCP agree with GOC towards preventing and treating any distressful symptoms. She agree with transferring to PCU and adjusting medications to avoid distress. Will transfer to PCU when a bed becomes available.

## 2020-06-22 NOTE — PROGRESS NOTE ADULT - PROBLEM SELECTOR PLAN 1
- pt w/ acute respitatory failure likely in setting of malignant pleural effusion. Also c/f PE or pericardial effusion. Now on bipap for IWOB  - EKG NSR w/o ischemic changes. Low voltage, but unchanged since last month. Trop negative x 2  - CT chest WC wo PE.   - Follow up echo  - Management of pleural effusion as below

## 2020-06-22 NOTE — CONSULT NOTE ADULT - ATTENDING COMMENTS
I saw the patient with the fellow. I agree with the fellow's findings and note above.    69 year old woman with h/o triple negative right breast cancer (diagnosed in 2018; was receiving regional chemo perfusion / embolization + radiation; no systemic chemotherapy, no surgery), anemia, MVP, presents for shortness of breath admitted with failure to thrive and increased work of breathing requiring BiPAP. Pulmonary consulted in the setting of large R pleural effusion and evaluation for pleural drainage.    -Maintain SpO2 >92%  -Can place back on NIV for increased work of breathing PRN  -Evaluated for pleural drainage today for symptom relief. Unfortunately with significant skin ulcerations, dermatitis and edema IPC placement may not be an option. Thoracentesis will also be challenging but can be done.   palliative consulted    Yordan Zheng MD

## 2020-06-22 NOTE — PROGRESS NOTE ADULT - ASSESSMENT
69F w/ R  breast cancer (diagnosed in 2018) s/p chemotherapy and radiation, now extensive progression of metastatic disease, fungating breast mass,  anemia, MVP, p/w SOB and is found to have hypoxemic respiratory failure in setting of large pleural effusion. Now on BIPAP 12/5 for IWOB.

## 2020-06-22 NOTE — PROGRESS NOTE ADULT - PROBLEM SELECTOR PLAN 2
-CT Head WC 6/21:  small hyperdense foci withing the R corona radiata and L temporal lobes with mild edema. T2/ L2 pathologic compression fx.  - Neurosurgery following:  no acute neurosurgical intervention  - Start prednisone 50mg QD  -TLSO brace for comfort  - MRI brain wwo ordered  - Palliative consult placed.  - Repeat CTH now that PTT therapeutic -CT Head WC 6/21:  small hyperdense foci withing the R corona radiata and L temporal lobes with mild edema. T2/ L2 pathologic compression fx.  - Neurosurgery following:  no acute neurosurgical intervention  - Start prednisone 50mg QD  -TLSO brace for comfort  - MRI brain wwo ordered  - Palliative consult placed.  - Repeat CTH on therapetic heparin wo bleeding

## 2020-06-22 NOTE — PROGRESS NOTE ADULT - PROBLEM SELECTOR PLAN 6
- Hold therapeutic LVX in setting of possible thoracentesis  - start heparin gtt given benefit of DVT ppx outweight risk of hemorrhagic transformation of brain metastasis  - monitor for compartment syndrome, wrist splint removed.

## 2020-06-22 NOTE — CONSULT NOTE ADULT - ASSESSMENT
69 year old female with metastatic  breast cancer (diagnosed in 2018. Mets to bone with malignant pleural effusion. Also with fungating wound and extensive metastases above and below the diaphragm including lymph node metastases and hepatic metastases. 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 During her recent admission she was undecided and in fact hesitant about getting DMT and did not want to address codes status. However, she assigned her "... very close family friend (often referred to as her "sister") Anh Hui, [phone], as sole proxy, and did not identify any alternate." She is now DNR/I based on the HCP inputs.     Pulmonary is evaluating and considering thoracentesis vs. Pleurx (which I will probably recommend if considered to be appropriate by pulm).     Palliative care is now called for GOC and symptoms rx. 69 year old female with metastatic  breast cancer (diagnosed in 2018. Mets to bone with malignant pleural effusion. Also with fungating wound and extensive metastases above and below the diaphragm including lymph node metastases and hepatic metastases. 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 Furthermore with ? Brain mets) s/p chemotherapy and radiation treatment ( last session about 6 months prior to admission),  anemia, MVP, presents for shortness of breath. During her recent admission she was undecided and in fact hesitant about getting DMT and did not want to address codes status. However, she assigned her "... very close family friend (often referred to as her "sister") Anh Hui, [phone], as sole proxy, and did not identify any alternate." She is now DNR/I based on the HCP inputs.     Pulmonary is evaluating and considering thoracentesis vs. Pleurx (which I will probably recommend if considered to be appropriate by pulm).     Palliative care is now called for GOC and symptoms rx.

## 2020-06-22 NOTE — CONSULT NOTE ADULT - SUBJECTIVE AND OBJECTIVE BOX
CHIEF COMPLAINT: SOB    HPI:   69 year old female with PMH triple negative right breast cancer (diagnosed in 2018; was receiving regional chemo perfusion / embolization + radiation; no systemic chemotherapy, no surgery), anemia, MVP, presents for shortness of breath. Pulmonary was consulted for respiratory failure requiring NIV and pleural effusion and evaluation for thoracentesis. Patient was admitted 6/3- during that admission, she was noted to have fungating breast ulcers with significant progression of disease and a RUE DVT. She presented back to the hospital yesterday with failure to thrive and worsening SOB. She was placed on Bilevel overnight for increased work of breathing. History is partially limited as patient (she is aox2) reports she does not feel like answering many questions but she notes that her breathing was worse since she left the hospital and she has felt very tired and unable to ambulate. Denies fevers, chills, night sweats or cough. CT chest yesterday reveals extensive metastatic disease involving the right breast and invading surrounding soft tissues, musculature as well as significant adenopathy and scattered bilateral pulmonary nodules. There is a loculated pleural effusion which increased in size from scan early in . In addition there are known osseous lesions and CTH is concerning for metastatic disease as well. Patient is DNR/DNI. Patient taken off NIV at bedside and placed on NC with good sat and similar work of breathing. She reports feeling better of NIV and she is thirsty.     PAST MEDICAL & SURGICAL HISTORY:  Deep vein thrombosis (DVT): RUE on LVX  Lymphedema: rt arm  H/O hemorrhoids  Mitral valve prolapse  Anemia  Breast cancer  H/O inguinal hernia repair  H/O umbilical hernia repair  H/O hemorrhoidectomy      FAMILY HISTORY:  FH: hyperlipidemia: brother  FH: hypertension: mother, brother      SOCIAL HISTORY:  Never smoker      Allergies    No Known Allergies    Intolerances        HOME MEDICATIONS:  Home Medications:  bacitracin 500 units/g topical ointment: Apply topically to affected area 2 times a day (2020 16:03)  enoxaparin 80 mg/0.8 mL injectable solution: 0.7 milliliter(s) injectable 2 times a day (2020 17:20)  gabapentin 100 mg oral capsule: 1 cap(s) orally 3 times a day (2020 16:03)  magic mouthwash: 5 ml by mouth 4 times daily (2020 16:03)  modafinil 100 mg oral tablet: 1 tab(s) orally once a day (2020 16:03)  Multiple Vitamins oral capsule: 1 cap(s) orally once a day (2020 16:)  oxyCODONE 5 mg oral tablet: 1 tab(s) orally every 4 hours, As needed, Moderate Pain (4 - 6) (2020 16:)  pantoprazole 40 mg oral delayed release tablet: 1 tab(s) orally once a day (before a meal) (:)  prednisoLONE (as sodium phosphate) 15 mg/5 mL oral liquid: 16.67 milliliter(s) orally once a day (:)  senna oral tablet: 2 tab(s) orally once a day (at bedtime) (:)  silver sulfADIAZINE 1% topical cream: Apply topically to affected area once a day (:)  Vitamin B Complex 100: tab(s) orally (2020 16:)      REVIEW OF SYSTEMS:  Constitutional: [ ] negative [ ] fevers [ ] chills [ ] weight loss [ ] weight gain [] as noted in the HPI  HEENT: [ ] negative [ ] dry eyes [ ] eye irritation [ ] postnasal drip [ ] nasal congestion  [] as noted in the HPI  CV: [ ] negative  [ ] chest pain [ ] orthopnea [ ] palpitations [ ] murmur [] as noted in the HPI  Resp: [ ] negative [ ] cough [ ] shortness of breath [ ] dyspnea [ ] wheezing [ ] sputum [ ] hemoptysis [] as noted in the HPI  GI: [ ] negative [ ] nausea [ ] vomiting [ ] diarrhea [ ] constipation [ ] abd pain [ ] dysphagia [] as noted in the HPI  : [ ] negative [ ] dysuria [ ] nocturia [ ] hematuria [ ] increased urinary frequency [] as noted in the HPI  Musculoskeletal: [ ] negative [ ] back pain [ ] myalgias [ ] arthralgias [ ] fracture [] as noted in the HPI  Skin: [ ] negative [ ] rash [ ] itch [] as noted in the HPI  Neurological: [ ] negative [ ] headache [ ] dizziness [ ] syncope [ ] weakness [ ] numbness [] as noted in the HPI  Psychiatric: [ ] negative [ ] anxiety [ ] depression [] as noted in the HPI  Endocrine: [ ] negative [ ] diabetes [ ] thyroid problem [] as noted in the HPI  Hematologic/Lymphatic: [ ] negative [ ] anemia [ ] bleeding problem [] as noted in the HPI  Allergic/Immunologic: [ ] negative [ ] itchy eyes [ ] nasal discharge [ ] hives [ ] angioedema [] as noted in the HPI  [x ] All other systems negative except as stated in the HPI  [ ] Unable to assess ROS because ________    OBJECTIVE:  ICU Vital Signs Last 24 Hrs  T(C): 36.4 (2020 04:48), Max: 37.3 (2020 11:28)  T(F): 97.5 (2020 04:48), Max: 99.2 (2020 11:28)  HR: 118 (2020 06:39) (105 - 121)  BP: 143/83 (2020 04:48) (117/87 - 153/89)  BP(mean): 97 (2020 14:44) (97 - 97)  ABP: --  ABP(mean): --  RR: 18 (2020 04:48) (18 - 34)  SpO2: 99% (2020 06:39) (96% - 100%)        06-21 @ 07:01  -  06-22 @ 07:00  --------------------------------------------------------  IN: 143 mL / OUT: 0 mL / NET: 143 mL      CAPILLARY BLOOD GLUCOSE      POCT Blood Glucose.: 123 mg/dL (2020 18:28)      PHYSICAL EXAM:  CONSTITUTIONAL:  Lethargic, chronically ill, faint speech  NECK:	No bruits; no thyromegaly. JVD not appreciated.   RESPIRATORY: On NC. Bibasilar crackles. Diminished R sided breath sounds to midlung.   CARDIOVASCULAR: Tachycardia. No murmurs, rubs, gallops. R sided chest mass. AB pad c/d/i. Thickened sclerotic red skin, with punctate lesions showing nonpurulent drainage.   GASTROINTESTINAL:  Soft, non tender. No organomegaly. No guarding.  EXTREMITIES: significant RUE swelling and in splint pain with rom  NEUROLOGICAL:   alert and oriented x 2; Moves all four extremities on command. No sensory deficits.     HOSPITAL MEDICATIONS:  Standing Meds:  BACItracin   Ointment 1 Application(s) Topical two times a day  FIRST- Mouthwash  BLM 15 milliLiter(s) Swish and Spit three times a day  gabapentin 100 milliGRAM(s) Oral three times a day  heparin  Infusion 13 Unit(s)/Hr IV Continuous <Continuous>  modafinil 100 milliGRAM(s) Oral daily  Nephro-robina 1 Tablet(s) Oral daily  pantoprazole    Tablet 40 milliGRAM(s) Oral before breakfast  predniSONE   Tablet 50 milliGRAM(s) Oral daily  senna 2 Tablet(s) Oral at bedtime  silver sulfADIAZINE 1% Cream 1 Application(s) Topical daily      PRN Meds:  oxyCODONE    IR 5 milliGRAM(s) Oral every 4 hours PRN      LABS:                        7.6    9.45  )-----------( 277      ( 2020 11:38 )             25.4     Hgb Trend: 7.6<--, 7.8<--  06-    142  |  104  |  14  ----------------------------<  114<H>  4.1   |  27  |  0.46<L>    Ca    8.8      2020 11:35    TPro  5.1<L>  /  Alb  2.7<L>  /  TBili  0.2  /  DBili  x   /  AST  56<H>  /  ALT  15  /  AlkPhos  138<H>      Creatinine Trend: 0.46<--, 0.48<--, 0.56<--, 0.52<--, 0.68<--, 0.54<--  PT/INR - ( 2020 11:38 )   PT: 14.2 sec;   INR: 1.24 ratio         PTT - ( 2020 02:16 )  PTT:63.1 sec  Urinalysis Basic - ( 2020 12:17 )    Color: Yellow / Appearance: Clear / S.022 / pH: x  Gluc: x / Ketone: Small  / Bili: Negative / Urobili: Negative   Blood: x / Protein: 30 mg/dL / Nitrite: Negative   Leuk Esterase: Negative / RBC: 11 /hpf / WBC 3 /HPF   Sq Epi: x / Non Sq Epi: 1 /hpf / Bacteria: Negative        Venous Blood Gas:   @ 13:43  7.45/42/73/28/96  VBG Lactate: 1.6  Venous Blood Gas:   @ 11:58  7.39/50/34/30/51  VBG Lactate: 1.9      MICROBIOLOGY:       =================================================================================================    RADIOLOGY:    [x ] Reviewed and interpreted by me  < from: CT Head No Cont (20 @ 14:07) >  Small slightly hyperdense foci within the right corona radiata and left temporal lobes with surrounding vasogenic edema concerning for metastasis, possibly hemorrhagic. Cavernoma considered as well. No significant mass effect or midline shift. Recommend MRI brain with IV contrast for further evaluation.    < end of copied text >    < from: CT Angio Chest w/ IV Cont (20 @ 14:07) >  LARGE AIRWAYS, LUNGS AND PLEURA: Patent central airways. A large loculated right pleural effusion, increased from small on 2020, with complete atelectasis of the right lower lobe and partial atelectasis of the right upper and middle lobes. Also a smallleft pleural effusion, new since prior. Bilateral pulmonary nodules, compatible with pulmonary metastases. A reference in the left upper lobe measures 0.2 x 1.4 cm (series 2, image 30), previously 1.9 x 0.9 cm. Irregular interlobular septal thickening on the right is concerning for lymphangitic spread.    MEDIASTINUM AND NATHANIEL: Extensive mediastinal and hilar lymphadenopathy with narrowing of the patent right pulmonary arteries. A reference prevascular node measures 2.7 x 1.8 cm (series 2, image 40), essentially unchanged from prior.  VESSELS: Streak artifact somewhat limits evaluation for pulmonary embolism. No pulmonary embolism.  HEART: Heart size is normal. No pericardial effusion.  CHEST WALL AND LOWER NECK: Profound right breast edema with irregular masslike skin thickening and numerous subcutaneous masses, compatible with known invasive breast cancer. A mass contiguous with the right pectoralis measures 4 x 2.3 cm (series 2, image 60), unchanged from most recent prior. Also extensive masslike nodularity of the right chest wall musculature including the infrascapular region posteriorly, most compatible with metastatic disease. Bilateral axillary lymphadenopathy. A reference right axillary node measures 1.9 x 1.3 cm (series 2, image 46), previously 1.7 x 1.4 cm. A left axillary node measures 3.7 x 2.7 cm (series 2, image 40), previously 3.4 x 2.2 cm. Also left breast skin thickening. Right supraclavicular lymphadenopathy with a right supraclavicular node measuring 4.4 x 3.7 cm(series 2, image 6), previously 4.1 x 3.4 cm. Marked right upper extremity intramuscular edema, increased from 2020  VISUALIZED UPPER ABDOMEN: Anasarca, asymmetric to the right, increased from 2020. Redemonstrated hepatic metastases with a reference confluent anterior hepatic metastasis measuring 6.9 x 4.2 cm (series 2, image 110), previously 6.6 cm when remeasured on prior. Partially imaged confluent retroperitoneal lymphadenopathy.  BONES: Lytic lesions involving T2, T12, L1 and L2 with interval worsening of the pathologic compression deformity of T2. Stable L1 pathologic compression deformity and mild worsening of the pathologic compression deformity of L2.    IMPRESSION:   No pulmonary embolism.    A large loculated right pleural effusion, increased from small on 2020, with complete atelectasis of the right lower lobe and partial atelectasis of the right upper and middle lobes. A new small left pleural effusion.    Recurrent invasive right breast cancer with extensive metastatic disease involving the right chest wall soft tissues and musculature. Also extensive metastases above and below the diaphragm including lymph node metastases and hepatic metastases.    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    Interval increase in partially imaged right upper extremity edema since prior.    Osseous metastatic disease with mild worsening of T2 and L2 pathologic compression deformities since 2020.    < end of copied text >

## 2020-06-22 NOTE — CONSULT NOTE ADULT - SUBJECTIVE AND OBJECTIVE BOX
HPI:  Patient is a 69 year old female with right breast cancer (diagnosed in 2018) s/p chemotherapy and radiation treatment ( last session about 6 months prior to admission),  anemia, MVP, presents for shortness of breath.  History attained with help of patients sister Anh, who is also her HCP. Patient had breast surgery 1 year ago. Patient does not use o2 at home. Patient weak and with little appetite and decreased po intake. Walked previously with walker, has not been ambulating lately. She began began feeling chronic dyspnea for the past few months, however now feels more SOB than in the past. Denies fevers, chills, night sweats, pain or CP. 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 Patient was pending an outpatient PET scan and follow up with oncology. On previous admission  patient was additionally found to be anemic , had EGD/ Colonoscopy negative on  recieved 1 unit of blood and was given Lovenox for DVT . SHe typically lives with two brothers and son at home. Typically Ao X 3 able to respond to questions appropriately. GOC disccusion with sister Anh, who made patient DNR / DNI given extensive malignancy.     In ED: 128/83, , RR 30 Temp 98.1. 100% on 3L NC  500cc NS. Vanc 1g, Cepefepime 2g. Placed on BIPAP  for IWOB. (2020 16:16)    . Patient well known to me. Basically she has metastatic breast CA with now with CT showing "A large loculated right pleural effusion, increased from small on 2020, with complete atelectasis of the right lower lobe and partial atelectasis of the right upper and middle lobes. A new small left pleural effusion.  Recurrent invasive right breast cancer with extensive metastatic disease involving the right chest wall soft tissues and musculature. Also extensive metastases above and below the diaphragm including lymph node metastases and hepatic metastases. 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 Interval increase in partially imaged right upper extremity edema since prior. Osseous metastatic disease with mild worsening of T2 and L2 pathologic compression deformities since 2020." During her recent admission she was undecided and in fact hesitant about getting DMT and did not want to address codes status. However, she assigned her "... very close family friend (often referred to as her "sister") Anh Hui, [phone], as sole proxy, and did not identify any alternate." She is now DNR/I based on the HCP inputs.     Pulmonary is evaluating and considering thoracentesis vs. Pleurx (which I will probably recommend if considered to be appropriate by pulm).     Palliative care is now called for GOC and symptoms rx.     PERTINENT PM/SXH:   Deep vein thrombosis (DVT)  Lymphedema  H/O hemorrhoids  Mitral valve prolapse  Anemia  Breast cancer    H/O inguinal hernia repair  H/O umbilical hernia repair  H/O hemorrhoidectomy    FAMILY HISTORY:  FH: hyperlipidemia: brother  FH: hypertension: mother, brother    ITEMS NOT CHECKED ARE NOT PRESENT    SOCIAL HISTORY:   Significant other/partner[ ]  Children[ ]  Denominational/Spirituality:  Substance hx:  [ ]   Tobacco hx:  [ ]   Alcohol hx: [ ]   Home Opioid hx:  [ ] I-Stop Reference No:  Living Situation: [ ]Home  [ ]Long term care  [ ]Rehab [ ]Other    ADVANCE DIRECTIVES:    DNR  Yes  MOLST  [ ]  Living Will  [ ]   DECISION MAKER(s):  [ ] Health Care Proxy(s)  [ ] Surrogate(s)  [ ] Guardian           Name(s): Phone Number(s):    BASELINE (I)ADL(s) (prior to admission):  [place]: [ ]Total  [ ] Moderate [ ]Dependent    Allergies    No Known Allergies    Intolerances    MEDICATIONS  (STANDING):  BACItracin   Ointment 1 Application(s) Topical two times a day  FIRST- Mouthwash  BLM 15 milliLiter(s) Swish and Spit three times a day  gabapentin 100 milliGRAM(s) Oral three times a day  heparin  Infusion 13 Unit(s)/Hr (13 mL/Hr) IV Continuous <Continuous>  modafinil 100 milliGRAM(s) Oral daily  Nephro-robina 1 Tablet(s) Oral daily  pantoprazole    Tablet 40 milliGRAM(s) Oral before breakfast  predniSONE   Tablet 50 milliGRAM(s) Oral daily  senna 2 Tablet(s) Oral at bedtime  silver sulfADIAZINE 1% Cream 1 Application(s) Topical daily    MEDICATIONS  (PRN):  oxyCODONE    IR 5 milliGRAM(s) Oral every 4 hours PRN Moderate Pain (4 - 6)    PRESENT SYMPTOMS: [ ]Unable to obtain due to poor mentation   Source if other than patient:  [ ]Family   [ ]Team     Pain: [ ]yes [ ]no  QOL impact -   Location -                    Aggravating factors -  Quality -  Radiation -  Timing-  Severity (0-10 scale):  Minimal acceptable level (0-10 scale):     CPOT:    https://www.[facility].org/getattachment/[email or web address]/Critical-Care-Pain-Observation-Tool-(CPOT)      PAIN AD Score:     http://geriatrictoolkit.[facility]/cog/painad.pdf (press ctrl +  left click to view)    Dyspnea:                           [ ]Mild [ ]Moderate [ ]Severe  Anxiety:                             [ ]Mild [ ]Moderate [ ]Severe  Fatigue:                             [ ]Mild [ ]Moderate [ ]Severe  Nausea:                             [ ]Mild [ ]Moderate [ ]Severe  Loss of appetite:              [ ]Mild [ ]Moderate [ ]Severe  Constipation:                    [ ]Mild [ ]Moderate [ ]Severe    Other Symptoms:  [ ]All other review of systems negative     Palliative Performance Status Version 2:         %    http://npcrc.org/files/news/palliative_performance_scale_ppsv2.pdf  PHYSICAL EXAM:  Vital Signs Last 24 Hrs  T(C): 36.4 (2020 04:48), Max: 36.9 (2020 23:05)  T(F): 97.5 (2020 04:48), Max: 98.4 (2020 23:05)  HR: 118 (2020 06:39) (105 - 121)  BP: 143/83 (2020 04:48) (117/87 - 143/83)  BP(mean): 97 (2020 14:44) (97 - 97)  RR: 18 (2020 04:48) (18 - 32)  SpO2: 99% (2020 06:39) (96% - 100%) I&O's Summary    2020 07:01  -  2020 07:00  --------------------------------------------------------  IN: 143 mL / OUT: 0 mL / NET: 143 mL      GENERAL:  [ ]Alert  [ ]Oriented x   [ ]Lethargic  [ ]Cachexia  [ ]Unarousable  [ ]Verbal  [ ]Non-Verbal  Behavioral:   [ ] Anxiety  [ ] Delirium [ ] Agitation [ ] Other  HEENT:  [ ]Normal   [ ]Dry mouth   [ ]ET Tube/Trach  [ ]Oral lesions  PULMONARY:   [ ]Clear [ ]Tachypnea  [ ]Audible excessive secretions   [ ]Rhonchi        [ ]Right [ ]Left [ ]Bilateral  [ ]Crackles        [ ]Right [ ]Left [ ]Bilateral  [ ]Wheezing     [ ]Right [ ]Left [ ]Bilateral  [ ]Diminished breath sounds [ ]right [ ]left [ ]bilateral  CARDIOVASCULAR:    [ ]Regular [ ]Irregular [ ]Tachy  [ ]Abner [ ]Murmur [ ]Other  GASTROINTESTINAL:  [ ]Soft  [ ]Distended   [ ]+BS  [ ]Non tender [ ]Tender  [ ]PEG [ ]OGT/ NGT  Last BM:     GENITOURINARY:  [ ]Normal [ ] Incontinent   [ ]Oliguria/Anuria   [ ]Birch  MUSCULOSKELETAL:   [ ]Normal   [ ]Weakness  [ ]Bed/Wheelchair bound [ ]Edema  NEUROLOGIC:   [ ]No focal deficits  [ ]Cognitive impairment  [ ]Dysphagia [ ]Dysarthria [ ]Paresis [ ]Other   SKIN:   [ ]Normal    [ ]Rash  [ ]Pressure ulcer(s)       Present on admission [ ]y [ ]n    CRITICAL CARE:  [ ] Shock Present  [ ]Septic [ ]Cardiogenic [ ]Neurologic [ ]Hypovolemic  [ ]  Vasopressors [ ]  Inotropes   [ ]Respiratory failure present [ ]Mechanical ventilation [ ]Non-invasive ventilatory support [ ]High flow    [ ]Acute  [ ]Chronic [ ]Hypoxic  [ ]Hypercarbic [ ]Other  [ ]Other organ failure     LABS:                        7.6    9.45  )-----------( 277      ( 2020 11:38 )             25.4   06-    142  |  104  |  14  ----------------------------<  114<H>  4.1   |  27  |  0.46<L>    Ca    8.8      2020 11:35    TPro  5.1<L>  /  Alb  2.7<L>  /  TBili  0.2  /  DBili  x   /  AST  56<H>  /  ALT  15  /  AlkPhos  138<H>  -  PT/INR - ( 2020 11:38 )   PT: 14.2 sec;   INR: 1.24 ratio         PTT - ( 2020 02:16 )  PTT:63.1 sec    Urinalysis Basic - ( 2020 12:17 )    Color: Yellow / Appearance: Clear / S.022 / pH: x  Gluc: x / Ketone: Small  / Bili: Negative / Urobili: Negative   Blood: x / Protein: 30 mg/dL / Nitrite: Negative   Leuk Esterase: Negative / RBC: 11 /hpf / WBC 3 /HPF   Sq Epi: x / Non Sq Epi: 1 /hpf / Bacteria: Negative      RADIOLOGY & ADDITIONAL STUDIES:    PROTEIN CALORIE MALNUTRITION PRESENT: [ ]mild [ ]moderate [ ]severe [ ]underweight [ ]morbid obesity  https://www.andeal.org/vault/2440/web/files/ONC/Table_Clinical%20Characteristics%20to%20Document%20Malnutrition-White%20JV%20et%20al%2020.pdf    Height (cm): 167.6 (20 @ 20:28), 167.6 (19 @ 09:00)  Weight (kg): 73.3 (20 @ 10:53), 74.2 (20 @ 20:28), 71.9 (19 @ 09:00)  BMI (kg/m2): 26.1 (20 @ 10:53), 26.4 (20 @ 20:28), 25.6 (19 @ 09:00)    [ ]PPSV2 < or = to 30% [ ]significant weight loss  [ ]poor nutritional intake  [ ]anasarca     Albumin, Serum: 2.7 g/dL (20 @ 11:35)   [ ]Artificial Nutrition      REFERRALS:   [ ]Chaplaincy  [ ]Hospice  [ ]Child Life  [ ]Social Work  [ ]Case management [ ]Holistic Therapy     Goals of Care Document: This note is still a draft and therefore it is not official yet.   HPI:  Patient is a 69 year old female with right breast cancer (diagnosed in 2018) s/p chemotherapy and radiation treatment ( last session about 6 months prior to admission),  anemia, MVP, presents for shortness of breath.  History attained with help of patients sister Anh, who is also her HCP. Patient had breast surgery 1 year ago. Patient does not use o2 at home. Patient weak and with little appetite and decreased po intake. Walked previously with walker, has not been ambulating lately. She began began feeling chronic dyspnea for the past few months, however now feels more SOB than in the past. Denies fevers, chills, night sweats, pain or CP. 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 Patient was pending an outpatient PET scan and follow up with oncology. On previous admission  patient was additionally found to be anemic , had EGD/ Colonoscopy negative on  recieved 1 unit of blood and was given Lovenox for DVT . SHe typically lives with two brothers and son at home. Typically Ao X 3 able to respond to questions appropriately. GOC disccusion with sister Anh, who made patient DNR / DNI given extensive malignancy.     In ED: 128/83, , RR 30 Temp 98.1. 100% on 3L NC  500cc NS. Vanc 1g, Cepefepime 2g. Placed on BIPAP  for IWOB. (2020 16:16)    . Patient well known to me. Basically she has metastatic breast CA with now with CT showing "A large loculated right pleural effusion, increased from small on 2020, with complete atelectasis of the right lower lobe and partial atelectasis of the right upper and middle lobes. A new small left pleural effusion.  Recurrent invasive right breast cancer with extensive metastatic disease involving the right chest wall soft tissues and musculature. Also extensive metastases above and below the diaphragm including lymph node metastases and hepatic metastases. 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 Interval increase in partially imaged right upper extremity edema since prior. Osseous metastatic disease with mild worsening of T2 and L2 pathologic compression deformities since 2020." During her recent admission she was undecided and in fact hesitant about getting DMT and did not want to address codes status. However, she assigned her "... very close family friend (often referred to as her "sister") Anh Hui, [phone], as sole proxy, and did not identify any alternate." She is now DNR/I based on the HCP inputs.     Pulmonary is evaluating and considering thoracentesis vs. Pleurx (which I will probably recommend if considered to be appropriate by pulm).     Palliative care is now called for GOC and symptoms rx.     PERTINENT PM/SXH:   Deep vein thrombosis (DVT)  Lymphedema  H/O hemorrhoids  Mitral valve prolapse  Anemia  Breast cancer    H/O inguinal hernia repair  H/O umbilical hernia repair  H/O hemorrhoidectomy    FAMILY HISTORY:  FH: hyperlipidemia: brother  FH: hypertension: mother, brother    ITEMS NOT CHECKED ARE NOT PRESENT    SOCIAL HISTORY:   Significant other/partner[ ]  Children[ ]  Orthodoxy/Spirituality:  Substance hx:  [ ]   Tobacco hx:  [ ]   Alcohol hx: [ ]   Home Opioid hx:  [ ] I-Stop Reference No:  Living Situation: [ ]Home  [ ]Long term care  [ ]Rehab [ ]Other    ADVANCE DIRECTIVES:    DNR  Yes  MOLST  [ ]  Living Will  [ ]   DECISION MAKER(s):  [ ] Health Care Proxy(s)  [ ] Surrogate(s)  [ ] Guardian           Name(s): Phone Number(s):    BASELINE (I)ADL(s) (prior to admission):  [place]: [ ]Total  [ ] Moderate [ ]Dependent    Allergies    No Known Allergies    Intolerances    MEDICATIONS  (STANDING):  BACItracin   Ointment 1 Application(s) Topical two times a day  FIRST- Mouthwash  BLM 15 milliLiter(s) Swish and Spit three times a day  gabapentin 100 milliGRAM(s) Oral three times a day  heparin  Infusion 13 Unit(s)/Hr (13 mL/Hr) IV Continuous <Continuous>  modafinil 100 milliGRAM(s) Oral daily  Nephro-robina 1 Tablet(s) Oral daily  pantoprazole    Tablet 40 milliGRAM(s) Oral before breakfast  predniSONE   Tablet 50 milliGRAM(s) Oral daily  senna 2 Tablet(s) Oral at bedtime  silver sulfADIAZINE 1% Cream 1 Application(s) Topical daily    MEDICATIONS  (PRN):  oxyCODONE    IR 5 milliGRAM(s) Oral every 4 hours PRN Moderate Pain (4 - 6)    PRESENT SYMPTOMS: [ ]Unable to obtain due to poor mentation   Source if other than patient:  [ ]Family   [ ]Team     Pain: [ ]yes [ ]no  QOL impact -   Location -                    Aggravating factors -  Quality -  Radiation -  Timing-  Severity (0-10 scale):  Minimal acceptable level (0-10 scale):     CPOT:    https://www.[facility].org/getattachment/[email or web address]/Critical-Care-Pain-Observation-Tool-(CPOT)      PAIN AD Score:     http://geriatrictoolkit.[facility]/cog/painad.pdf (press ctrl +  left click to view)    Dyspnea:                           [ ]Mild [ ]Moderate [ ]Severe  Anxiety:                             [ ]Mild [ ]Moderate [ ]Severe  Fatigue:                             [ ]Mild [ ]Moderate [ ]Severe  Nausea:                             [ ]Mild [ ]Moderate [ ]Severe  Loss of appetite:              [ ]Mild [ ]Moderate [ ]Severe  Constipation:                    [ ]Mild [ ]Moderate [ ]Severe    Other Symptoms:  [ ]All other review of systems negative     Palliative Performance Status Version 2:         %    http://npcrc.org/files/news/palliative_performance_scale_ppsv2.pdf  PHYSICAL EXAM:  Vital Signs Last 24 Hrs  T(C): 36.4 (2020 04:48), Max: 36.9 (2020 23:05)  T(F): 97.5 (2020 04:48), Max: 98.4 (2020 23:05)  HR: 118 (2020 06:39) (105 - 121)  BP: 143/83 (2020 04:48) (117/87 - 143/83)  BP(mean): 97 (2020 14:44) (97 - 97)  RR: 18 (2020 04:48) (18 - 32)  SpO2: 99% (2020 06:39) (96% - 100%) I&O's Summary    2020 07:01  -  2020 07:00  --------------------------------------------------------  IN: 143 mL / OUT: 0 mL / NET: 143 mL      GENERAL:  [ ]Alert  [ ]Oriented x   [ ]Lethargic  [ ]Cachexia  [ ]Unarousable  [ ]Verbal  [ ]Non-Verbal  Behavioral:   [ ] Anxiety  [ ] Delirium [ ] Agitation [ ] Other  HEENT:  [ ]Normal   [ ]Dry mouth   [ ]ET Tube/Trach  [ ]Oral lesions  PULMONARY:   [ ]Clear [ ]Tachypnea  [ ]Audible excessive secretions   [ ]Rhonchi        [ ]Right [ ]Left [ ]Bilateral  [ ]Crackles        [ ]Right [ ]Left [ ]Bilateral  [ ]Wheezing     [ ]Right [ ]Left [ ]Bilateral  [ ]Diminished breath sounds [ ]right [ ]left [ ]bilateral  CARDIOVASCULAR:    [ ]Regular [ ]Irregular [ ]Tachy  [ ]Abner [ ]Murmur [ ]Other  GASTROINTESTINAL:  [ ]Soft  [ ]Distended   [ ]+BS  [ ]Non tender [ ]Tender  [ ]PEG [ ]OGT/ NGT  Last BM:     GENITOURINARY:  [ ]Normal [ ] Incontinent   [ ]Oliguria/Anuria   [ ]Birch  MUSCULOSKELETAL:   [ ]Normal   [ ]Weakness  [ ]Bed/Wheelchair bound [ ]Edema  NEUROLOGIC:   [ ]No focal deficits  [ ]Cognitive impairment  [ ]Dysphagia [ ]Dysarthria [ ]Paresis [ ]Other   SKIN:   [ ]Normal    [ ]Rash  [ ]Pressure ulcer(s)       Present on admission [ ]y [ ]n    CRITICAL CARE:  [ ] Shock Present  [ ]Septic [ ]Cardiogenic [ ]Neurologic [ ]Hypovolemic  [ ]  Vasopressors [ ]  Inotropes   [ ]Respiratory failure present [ ]Mechanical ventilation [ ]Non-invasive ventilatory support [ ]High flow    [ ]Acute  [ ]Chronic [ ]Hypoxic  [ ]Hypercarbic [ ]Other  [ ]Other organ failure     LABS:                        7.6    9.45  )-----------( 277      ( 2020 11:38 )             25.4   06-21    142  |  104  |  14  ----------------------------<  114<H>  4.1   |  27  |  0.46<L>    Ca    8.8      2020 11:35    TPro  5.1<L>  /  Alb  2.7<L>  /  TBili  0.2  /  DBili  x   /  AST  56<H>  /  ALT  15  /  AlkPhos  138<H>    PT/INR - ( 2020 11:38 )   PT: 14.2 sec;   INR: 1.24 ratio         PTT - ( 2020 02:16 )  PTT:63.1 sec    Urinalysis Basic - ( 2020 12:17 )    Color: Yellow / Appearance: Clear / S.022 / pH: x  Gluc: x / Ketone: Small  / Bili: Negative / Urobili: Negative   Blood: x / Protein: 30 mg/dL / Nitrite: Negative   Leuk Esterase: Negative / RBC: 11 /hpf / WBC 3 /HPF   Sq Epi: x / Non Sq Epi: 1 /hpf / Bacteria: Negative      RADIOLOGY & ADDITIONAL STUDIES:    PROTEIN CALORIE MALNUTRITION PRESENT: [ ]mild [ ]moderate [ ]severe [ ]underweight [ ]morbid obesity  https://www.andeal.org/vault/2440/web/files/ONC/Table_Clinical%20Characteristics%20to%20Document%20Malnutrition-White%20JV%20et%20al%740241.pdf    Height (cm): 167.6 (20 @ 20:28), 167.6 (19 @ 09:00)  Weight (kg): 73.3 (20 @ 10:53), 74.2 (20 @ 20:28), 71.9 (19 @ 09:00)  BMI (kg/m2): 26.1 (20 @ 10:53), 26.4 (20 @ 20:28), 25.6 (19 @ 09:00)    [ ]PPSV2 < or = to 30% [ ]significant weight loss  [ ]poor nutritional intake  [ ]anasarca     Albumin, Serum: 2.7 g/dL (20 @ 11:35)   [ ]Artificial Nutrition      REFERRALS:   [ ]Chaplaincy  [ ]Hospice  [ ]Child Life  [ ]Social Work  [ ]Case management [ ]Holistic Therapy     Goals of Care Document: This note is still a draft and therefore it is not official yet.   HPI:  Patient is a 69 year old female with right breast cancer (diagnosed in 2018) s/p chemotherapy and radiation treatment ( last session about 6 months prior to admission),  anemia, MVP, presents for shortness of breath.  History attained with help of patients sister Anh, who is also her HCP. Patient had breast surgery 1 year ago. Patient does not use o2 at home. Patient weak and with little appetite and decreased po intake. Walked previously with walker, has not been ambulating lately. She began began feeling chronic dyspnea for the past few months, however now feels more SOB than in the past. Denies fevers, chills, night sweats, pain or CP. 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 Patient was pending an outpatient PET scan and follow up with oncology. On previous admission  patient was additionally found to be anemic , had EGD/ Colonoscopy negative on  recieved 1 unit of blood and was given Lovenox for DVT . SHe typically lives with two brothers and son at home. Typically Ao X 3 able to respond to questions appropriately. GOC disccusion with sister Anh, who made patient DNR / DNI given extensive malignancy.     In ED: 128/83, , RR 30 Temp 98.1. 100% on 3L NC  500cc NS. Vanc 1g, Cepefepime 2g. Placed on BIPAP  for IWOB. (2020 16:16)    . Patient well known to me. Basically she has metastatic breast CA with now with CT showing "A large loculated right pleural effusion, increased from small on 2020, with complete atelectasis of the right lower lobe and partial atelectasis of the right upper and middle lobes. A new small left pleural effusion.  Recurrent invasive right breast cancer with extensive metastatic disease involving the right chest wall soft tissues and musculature. Also extensive metastases above and below the diaphragm including lymph node metastases and hepatic metastases. 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 Interval increase in partially imaged right upper extremity edema since prior. Osseous metastatic disease with mild worsening of T2 and L2 pathologic compression deformities since 2020." During her recent admission she was undecided and in fact hesitant about getting DMT and did not want to address codes status. However, she assigned her "... very close family friend (often referred to as her "sister") Anh Hui, [phone], as sole proxy, and did not identify any alternate." She is now DNR/I based on the HCP inputs.     Pulmonary is evaluating and considering thoracentesis vs. Pleurx (which I will probably recommend if considered to be appropriate by pulm).     Palliative care is now called for GOC and symptoms rx.      Patient in severe respiratory distress. Clearly uncomfortable. Although denying pain, she appeared to be in pain.     PERTINENT PM/SXH:   Deep vein thrombosis (DVT)  Lymphedema  H/O hemorrhoids  Mitral valve prolapse  Anemia  Breast cancer    H/O inguinal hernia repair  H/O umbilical hernia repair  H/O hemorrhoidectomy    FAMILY HISTORY:  FH: hyperlipidemia: brother  FH: hypertension: mother, brother    ITEMS NOT CHECKED ARE NOT PRESENT    SOCIAL HISTORY:   Significant other/partner[ ]  Children[ ]  Islam/Spirituality:  Substance hx:  [ ]   Tobacco hx:  [ ]   Alcohol hx: [ ]   Home Opioid hx:  [ ] I-Stop Reference No:  Living Situation: [ ]Home  [ ]Long term care  [ ]Rehab [ ]Other    ADVANCE DIRECTIVES:    DNR  Yes  MOLST  [ ]  Living Will  [ ]   DECISION MAKER(s):  [x ] Health Care Proxy(s)  [ ] Surrogate(s)  [ ] Guardian           Name(s): Phone Number(s): See above.     BASELINE (I)ADL(s) (prior to admission):  [place]: [ ]Total  [ ] Moderate [ ]Dependent    Allergies    No Known Allergies    Intolerances    MEDICATIONS  (STANDING):  BACItracin   Ointment 1 Application(s) Topical two times a day  FIRST- Mouthwash  BLM 15 milliLiter(s) Swish and Spit three times a day  gabapentin 100 milliGRAM(s) Oral three times a day  heparin  Infusion 13 Unit(s)/Hr (13 mL/Hr) IV Continuous <Continuous>  modafinil 100 milliGRAM(s) Oral daily  Nephro-robina 1 Tablet(s) Oral daily  pantoprazole    Tablet 40 milliGRAM(s) Oral before breakfast  predniSONE   Tablet 50 milliGRAM(s) Oral daily  senna 2 Tablet(s) Oral at bedtime  silver sulfADIAZINE 1% Cream 1 Application(s) Topical daily    MEDICATIONS  (PRN):  oxyCODONE    IR 5 milliGRAM(s) Oral every 4 hours PRN Moderate Pain (4 - 6)    PRESENT SYMPTOMS: [ ]Unable to obtain due to poor mentation   Source if other than patient:  [ ]Family   [ ]Team     Pain: [ ]yes [ ]no  QOL impact -   Location -                    Aggravating factors -  Quality -  Radiation -  Timing-  Severity (0-10 scale):  Minimal acceptable level (0-10 scale):     CPOT:    https://www.[facility].org/getattachment/[email or web address]/Critical-Care-Pain-Observation-Tool-(CPOT)      PAIN AD Score:     http://geriatrictoolkit.[facility]/cog/painad.pdf (press ctrl +  left click to view)    Dyspnea:                           [ ]Mild [ ]Moderate [ ]Severe  Anxiety:                             [ ]Mild [ ]Moderate [ ]Severe  Fatigue:                             [ ]Mild [ ]Moderate [ ]Severe  Nausea:                             [ ]Mild [ ]Moderate [ ]Severe  Loss of appetite:              [ ]Mild [ ]Moderate [ ]Severe  Constipation:                    [ ]Mild [ ]Moderate [ ]Severe    Other Symptoms:  [ ]All other review of systems negative     Palliative Performance Status Version 2:         %    http://npcrc.org/files/news/palliative_performance_scale_ppsv2.pdf  PHYSICAL EXAM:  Vital Signs Last 24 Hrs  T(C): 36.4 (2020 04:48), Max: 36.9 (2020 23:05)  T(F): 97.5 (2020 04:48), Max: 98.4 (2020 23:05)  HR: 118 (2020 06:39) (105 - 121)  BP: 143/83 (2020 04:48) (117/87 - 143/83)  BP(mean): 97 (2020 14:44) (97 - 97)  RR: 18 (2020 04:48) (18 - 32)  SpO2: 99% (2020 06:39) (96% - 100%) I&O's Summary    2020 07:01  -  2020 07:00  --------------------------------------------------------  IN: 143 mL / OUT: 0 mL / NET: 143 mL      GENERAL:  [ ]Alert  [ ]Oriented x   [ ]Lethargic  [ ]Cachexia  [ ]Unarousable  [ ]Verbal  [ ]Non-Verbal  Behavioral:   [ ] Anxiety  [ ] Delirium [ ] Agitation [ ] Other  HEENT:  [ ]Normal   [ ]Dry mouth   [ ]ET Tube/Trach  [ ]Oral lesions  PULMONARY:   [ ]Clear [ ]Tachypnea  [ ]Audible excessive secretions   [ ]Rhonchi        [ ]Right [ ]Left [ ]Bilateral  [ ]Crackles        [ ]Right [ ]Left [ ]Bilateral  [ ]Wheezing     [ ]Right [ ]Left [ ]Bilateral  [ ]Diminished breath sounds [ ]right [ ]left [ ]bilateral  CARDIOVASCULAR:    [ ]Regular [ ]Irregular [ ]Tachy  [ ]Abner [ ]Murmur [ ]Other  GASTROINTESTINAL:  [ ]Soft  [ ]Distended   [ ]+BS  [ ]Non tender [ ]Tender  [ ]PEG [ ]OGT/ NGT  Last BM:     GENITOURINARY:  [ ]Normal [ ] Incontinent   [ ]Oliguria/Anuria   [ ]Birch  MUSCULOSKELETAL:   [ ]Normal   [ ]Weakness  [ ]Bed/Wheelchair bound [ ]Edema  NEUROLOGIC:   [ ]No focal deficits  [ ]Cognitive impairment  [ ]Dysphagia [ ]Dysarthria [ ]Paresis [ ]Other   SKIN:   [ ]Normal    [ ]Rash  [ ]Pressure ulcer(s)       Present on admission [ ]y [ ]n    CRITICAL CARE:  [ ] Shock Present  [ ]Septic [ ]Cardiogenic [ ]Neurologic [ ]Hypovolemic  [ ]  Vasopressors [ ]  Inotropes   [ ]Respiratory failure present [ ]Mechanical ventilation [ ]Non-invasive ventilatory support [ ]High flow    [ ]Acute  [ ]Chronic [ ]Hypoxic  [ ]Hypercarbic [ ]Other  [ ]Other organ failure     LABS:                        7.6    9.45  )-----------( 277      ( 2020 11:38 )             25.4   06-21    142  |  104  |  14  ----------------------------<  114<H>  4.1   |  27  |  0.46<L>    Ca    8.8      2020 11:35    TPro  5.1<L>  /  Alb  2.7<L>  /  TBili  0.2  /  DBili  x   /  AST  56<H>  /  ALT  15  /  AlkPhos  138<H>    PT/INR - ( 2020 11:38 )   PT: 14.2 sec;   INR: 1.24 ratio         PTT - ( 2020 02:16 )  PTT:63.1 sec    Urinalysis Basic - ( 2020 12:17 )    Color: Yellow / Appearance: Clear / S.022 / pH: x  Gluc: x / Ketone: Small  / Bili: Negative / Urobili: Negative   Blood: x / Protein: 30 mg/dL / Nitrite: Negative   Leuk Esterase: Negative / RBC: 11 /hpf / WBC 3 /HPF   Sq Epi: x / Non Sq Epi: 1 /hpf / Bacteria: Negative      RADIOLOGY & ADDITIONAL STUDIES:    PROTEIN CALORIE MALNUTRITION PRESENT: [ ]mild [ ]moderate [ ]severe [ ]underweight [ ]morbid obesity  https://www.andeal.org/vault/2440/web/files/ONC/Table_Clinical%20Characteristics%20to%20Document%20Malnutrition-White%20JV%20et%20al%749225.pdf    Height (cm): 167.6 (20 @ 20:28), 167.6 (19 @ 09:00)  Weight (kg): 73.3 (20 @ 10:53), 74.2 (20 @ 20:28), 71.9 (19 @ 09:00)  BMI (kg/m2): 26.1 (20 @ 10:53), 26.4 (20 @ 20:28), 25.6 (19 @ 09:00)    [ ]PPSV2 < or = to 30% [ ]significant weight loss  [ ]poor nutritional intake  [ ]anasarca     Albumin, Serum: 2.7 g/dL (20 @ 11:35)   [ ]Artificial Nutrition      REFERRALS:   [ ]Chaplaincy  [ ]Hospice  [ ]Child Life  [ ]Social Work  [ ]Case management [ ]Holistic Therapy     Goals of Care Document: HPI:  Patient is a 69 year old female with right breast cancer (diagnosed in 2018) s/p chemotherapy and radiation treatment ( last session about 6 months prior to admission),  anemia, MVP, presents for shortness of breath.  History attained with help of patients sister Anh, who is also her HCP. Patient had breast surgery 1 year ago. Patient does not use o2 at home. Patient weak and with little appetite and decreased po intake. Walked previously with walker, has not been ambulating lately. She began began feeling chronic dyspnea for the past few months, however now feels more SOB than in the past. Denies fevers, chills, night sweats, pain or CP. 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 Patient was pending an outpatient PET scan and follow up with oncology. On previous admission  patient was additionally found to be anemic , had EGD/ Colonoscopy negative on  recieved 1 unit of blood and was given Lovenox for DVT . SHe typically lives with two brothers and son at home. Typically Ao X 3 able to respond to questions appropriately. GOC disccusion with sister Anh, who made patient DNR / DNI given extensive malignancy.     In ED: 128/83, , RR 30 Temp 98.1. 100% on 3L NC  500cc NS. Vanc 1g, Cepefepime 2g. Placed on BIPAP  for IWOB. (2020 16:16)    . Patient well known to me. Basically she has metastatic breast CA now with CT showing "A large loculated right pleural effusion, increased from small on 2020, with complete atelectasis of the right lower lobe and partial atelectasis of the right upper and middle lobes. A new small left pleural effusion.  Recurrent invasive right breast cancer with extensive metastatic disease involving the right chest wall soft tissues and musculature. Also extensive metastases above and below the diaphragm including lymph node metastases and hepatic metastases. 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 Interval increase in partially imaged right upper extremity edema since prior. Osseous metastatic disease with mild worsening of T2 and L2 pathologic compression deformities since 2020." During her recent admission she was undecided and in fact hesitant about getting DMT and did not want to address codes status. However, she assigned her "... very close family friend (often referred to as her "sister") Anh Hui, [phone], as sole proxy, and did not identify any alternate." She is now DNR/I based on the HCP inputs.     Pulmonary is evaluating and considering thoracentesis vs. Pleurx (which I will probably recommend if considered to be appropriate by pulm).     Palliative care is now called for GOC and symptoms rx.      Patient in severe respiratory distress. Clearly uncomfortable. Although denying pain, she appeared to be in pain.     PERTINENT PM/SXH:   Deep vein thrombosis (DVT)  Lymphedema  H/O hemorrhoids  Mitral valve prolapse  Anemia  Breast cancer    H/O inguinal hernia repair  H/O umbilical hernia repair  H/O hemorrhoidectomy    FAMILY HISTORY:  FH: hyperlipidemia: brother  FH: hypertension: mother, brother    ITEMS NOT CHECKED ARE NOT PRESENT    SOCIAL HISTORY:   Significant other/partner[ ]  Children[ ]  Mandaen/Spirituality:  Substance hx:  [ ]   Tobacco hx:  [ ]   Alcohol hx: [ ]   Home Opioid hx:  [ ] I-Stop Reference No:  Living Situation: [ x]Home  [ ]Long term care  [ ]Rehab [ ]Other   As per care coordination notes: "Pt was sleeping and did not wake to verbal stimuli. Called pt's brother Chandler Vasquez to discuss discharge planning and the role of Case management Chandler  verbalized understanding. Demographics confirmed. Permission granted to speak  with outside agencies as needed. Pt lives with siblings. Prior to admission pt  was independent in self care, brother and niece assisted with high level ADL.  No assistive devices, no previous home care, able to negotiate stairs."   ADVANCE DIRECTIVES:    DNR  Yes  MOLST  [ ]  Living Will  [ ]   DECISION MAKER(s):  [x ] Health Care Proxy(s)  [ ] Surrogate(s)  [ ] Guardian           Name(s): Phone Number(s): See HPI.     BASELINE (I)ADL(s) (prior to admission):  [place]: [ ]Total  [ ] Moderate [x ]Dependent    Allergies    No Known Allergies    Intolerances    MEDICATIONS  (STANDING):  BACItracin   Ointment 1 Application(s) Topical two times a day  FIRST- Mouthwash  BLM 15 milliLiter(s) Swish and Spit three times a day  gabapentin 100 milliGRAM(s) Oral three times a day  heparin  Infusion 13 Unit(s)/Hr (13 mL/Hr) IV Continuous <Continuous>  modafinil 100 milliGRAM(s) Oral daily  Nephro-robina 1 Tablet(s) Oral daily  pantoprazole    Tablet 40 milliGRAM(s) Oral before breakfast  predniSONE   Tablet 50 milliGRAM(s) Oral daily  senna 2 Tablet(s) Oral at bedtime  silver sulfADIAZINE 1% Cream 1 Application(s) Topical daily    MEDICATIONS  (PRN):  oxyCODONE    IR 5 milliGRAM(s) Oral every 4 hours PRN Moderate Pain (4 - 6)    PRESENT SYMPTOMS: [ x]Unable to obtain due to poor mentation and respiratory distress    Source if other than patient:  [ ]Family   [ ]Team     Pain: [ ]yes [x ]no  QOL impact -   Location -                    Aggravating factors -  Quality -  Radiation -  Timing-  Severity (0-10 scale):  Minimal acceptable level (0-10 scale):     CPOT:    https://www.[facility].org/getattachment/imd98w71-4p0c-1b6r-7s9l-9686r1104y9f/Critical-Care-Pain-Observation-Tool-(CPOT)      PAIN AD Score: 4    http://geriatrictoolkit.[place].[facility]/cog/painad.pdf (press ctrl +  left click to view)    Dyspnea:                           [ ]Mild [ ]Moderate [ ]Severe  Anxiety:                             [ ]Mild [ ]Moderate [ ]Severe  Fatigue:                             [ ]Mild [ ]Moderate [ ]Severe  Nausea:                             [ ]Mild [ ]Moderate [ ]Severe  Loss of appetite:              [ ]Mild [ ]Moderate [ ]Severe  Constipation:                    [ ]Mild [ ]Moderate [ ]Severe    Other Symptoms:  [ ]All other review of systems negative     Palliative Performance Status Version 2:   10      %    http://npcrc.org/files/news/palliative_performance_scale_ppsv2.pdf  PHYSICAL EXAM:  Vital Signs Last 24 Hrs  T(C): 36.4 (2020 04:48), Max: 36.9 (2020 23:05)  T(F): 97.5 (2020 04:48), Max: 98.4 (2020 23:05)  HR: 118 (2020 06:39) (105 - 121)  BP: 143/83 (2020 04:48) (117/87 - 143/83)  BP(mean): 97 (2020 14:44) (97 - 97)  RR: 18 (2020 04:48) (18 - 32)  SpO2: 99% (2020 06:39) (96% - 100%) I&O's Summary    2020 07:01  -  2020 07:00  --------------------------------------------------------  IN: 143 mL / OUT: 0 mL / NET: 143 mL      GENERAL:  [ ]Alert  [ ]Oriented x   [x ]Lethargic  [ ]Cachexia  [ ]Unarousable  [x ] Minimally Verbal  [ ]Non-Verbal  Behavioral:   [x ] Anxiety  [ ] Delirium [ ] Agitation [ ] Other  HEENT:  [ ]Normal   [x ]Dry mouth   [ ]ET Tube/Trach  [ ]Oral lesions  PULMONARY:   [ ]Clear [ ]Tachypnea  [ ]Audible excessive secretions   [ ]Rhonchi        [ ]Right [ ]Left [ ]Bilateral  [x ]Crackles        [ ]Right [x ]Left [ ]Bilateral  [ ]Wheezing     [ ]Right [ ]Left [ ]Bilateral  [x ]Diminished breath sounds [x ]right [ ]left [ ]bilateral  CARDIOVASCULAR:    [ ]Regular [ ]Irregular [x ]Tachy  [ ]Abner [ ]Murmur [ ]Other  GASTROINTESTINAL:  [ ]Soft  [ ]Distended   [x ]+BS  [x ]Non tender [ ]Tender  [ ]PEG [ ]OGT/ NGT  Last BM:     GENITOURINARY:  [ ]Normal [ x] Incontinent   [ ]Oliguria/Anuria   [ ]Birch  MUSCULOSKELETAL:   [ ]Normal   [x ]Weakness  [ ]Bed/Wheelchair bound [ ]Edema  NEUROLOGIC:   [ ]No focal deficits  [ ]Cognitive impairment  [ ]Dysphagia [ ]Dysarthria [ ]Paresis [ x]Other: Lethargic   SKIN:   [ ]Normal    [ ]Rash  [ ]Pressure ulcer(s)       Present on admission [ ]y [ ]n [x] Fungating mass over righty breast with blisters and small discharge.     CRITICAL CARE:  [ ] Shock Present  [ ]Septic [ ]Cardiogenic [ ]Neurologic [ ]Hypovolemic  [ ]  Vasopressors [ ]  Inotropes   [ ]Respiratory failure present [ ]Mechanical ventilation [ ]Non-invasive ventilatory support [ ]High flow    [ ]Acute  [ ]Chronic [ ]Hypoxic  [ ]Hypercarbic [ ]Other  [ ]Other organ failure     LABS:                        7.6    9.45  )-----------( 277      ( 2020 11:38 )             25.4       142  |  104  |  14  ----------------------------<  114<H>  4.1   |  27  |  0.46<L>    Ca    8.8      2020 11:35    TPro  5.1<L>  /  Alb  2.7<L>  /  TBili  0.2  /  DBili  x   /  AST  56<H>  /  ALT  15  /  AlkPhos  138<H>    PT/INR - ( 2020 11:38 )   PT: 14.2 sec;   INR: 1.24 ratio         PTT - ( 2020 02:16 )  PTT:63.1 sec    Urinalysis Basic - ( 2020 12:17 )    Color: Yellow / Appearance: Clear / S.022 / pH: x  Gluc: x / Ketone: Small  / Bili: Negative / Urobili: Negative   Blood: x / Protein: 30 mg/dL / Nitrite: Negative   Leuk Esterase: Negative / RBC: 11 /hpf / WBC 3 /HPF   Sq Epi: x / Non Sq Epi: 1 /hpf / Bacteria: Negative      RADIOLOGY & ADDITIONAL STUDIES:    PROTEIN CALORIE MALNUTRITION PRESENT: [ ]mild [ ]moderate [ ]severe [ ]underweight [ ]morbid obesity  https://www.andeal.org/vault/2440/web/files/ONC/Table_Clinical%20Characteristics%20to%20Document%20Malnutrition-White%20JV%20et%20al%809451.pdf    Height (cm): 167.6 (20 @ 20:28), 167.6 (19 @ 09:00)  Weight (kg): 73.3 (20 @ 10:53), 74.2 (20 @ 20:28), 71.9 (19 @ 09:00)  BMI (kg/m2): 26.1 (20 @ 10:53), 26.4 (20 @ 20:28), 25.6 (19 @ 09:00)    [ ]PPSV2 < or = to 30% [ ]significant weight loss  [ ]poor nutritional intake  [ ]anasarca     Albumin, Serum: 2.7 g/dL (20 @ 11:35)   [ ]Artificial Nutrition      REFERRALS:   [ ]Chaplaincy  [ ]Hospice  [ ]Child Life  [ ]Social Work  [ ]Case management [ ]Holistic Therapy     Goals of Care Document:

## 2020-06-22 NOTE — PROGRESS NOTE ADULT - PROBLEM SELECTOR PLAN 4
- Tachycardia, tachypnea, leukocytosis, toxic appearance c/f infection vs malignancy.  - Possible etiology of infxn including fungating breast mass, aspiration event  - s/p vanc/cefepime in ED 6/21  - Will monitor off antibiotics for now, has not had fevers in hospital.  - BCX 6/21 NGTD   - UCX NGTD  - f/u procal

## 2020-06-22 NOTE — CONSULT NOTE ADULT - REASON FOR ADMISSION
SOB and RUE swelling x one month

## 2020-06-22 NOTE — PROGRESS NOTE ADULT - SUBJECTIVE AND OBJECTIVE BOX
PROGRESS NOTE:     CONTACT INFO:  Fredy Thurman MD (PGY 1, Internal Medicine)  Pager: [phone]      SUBJECTIVE / OVERNIGHT EVENTS:  No acute events overnight. Patient seen and evaluated at bedside. Unable to obtain ROS due to pt condition    ADDITIONAL REVIEW OF SYSTEMS:    MEDICATIONS  (STANDING):  BACItracin   Ointment 1 Application(s) Topical two times a day  FIRST- Mouthwash  BLM 15 milliLiter(s) Swish and Spit three times a day  gabapentin 100 milliGRAM(s) Oral three times a day  heparin  Infusion 13 Unit(s)/Hr (13 mL/Hr) IV Continuous <Continuous>  modafinil 100 milliGRAM(s) Oral daily  Nephro-robina 1 Tablet(s) Oral daily  pantoprazole    Tablet 40 milliGRAM(s) Oral before breakfast  predniSONE   Tablet 50 milliGRAM(s) Oral daily  senna 2 Tablet(s) Oral at bedtime  silver sulfADIAZINE 1% Cream 1 Application(s) Topical daily    MEDICATIONS  (PRN):  oxyCODONE    IR 5 milliGRAM(s) Oral every 4 hours PRN Moderate Pain (4 - 6)      CAPILLARY BLOOD GLUCOSE      POCT Blood Glucose.: 123 mg/dL (2020 18:28)    I&O's Summary    2020 07:01  -  2020 07:00  --------------------------------------------------------  IN: 143 mL / OUT: 0 mL / NET: 143 mL        PHYSICAL EXAM:  Vital Signs Last 24 Hrs  T(C): 36.4 (2020 04:48), Max: 37.3 (2020 11:28)  T(F): 97.5 (2020 04:48), Max: 99.2 (2020 11:28)  HR: 118 (2020 06:39) (105 - 121)  BP: 143/83 (2020 04:48) (117/87 - 153/89)  BP(mean): 97 (2020 14:44) (97 - 97)  RR: 18 (2020 04:48) (18 - 34)  SpO2: 99% (2020 06:39) (96% - 100%)    	· CONSTITUTIONAL:  Lethargic, toxic appearing  	· NECK:	No bruits; no thyromegaly. JVD not appreciated.   	·RESPIRATORY: On BIPAP. Bibasilar crackles Diminished R sided breath sounds to midlung.   	· CARDIOVASCULAR: Tachycardia. No murmurs, rubs, gallops. R sided chest mass. AB pad c/d/i. Thickened sclerotic red skin, with punctate lesions showing nonpurulent drainage.   	. GASTROINTESTINAL:  Soft, non tender. No organomegaly. No guarding.  	· EXTREMITIES: RUE swelling and in splint pain with rom  	·NEUROLOGICAL:   alert and oriented x 2; Moves all four extremities on command. No sensory deficits.   · MUSCULOSKELETAL:   No calf tenderness  no joint swelling    LABS:                        7.6    9.45  )-----------( 277      ( 2020 11:38 )             25.4     06-    142  |  104  |  14  ----------------------------<  114<H>  4.1   |  27  |  0.46<L>    Ca    8.8      2020 11:35    TPro  5.1<L>  /  Alb  2.7<L>  /  TBili  0.2  /  DBili  x   /  AST  56<H>  /  ALT  15  /  AlkPhos  138<H>  06-21    PT/INR - ( 2020 11:38 )   PT: 14.2 sec;   INR: 1.24 ratio         PTT - ( 2020 02:16 )  PTT:63.1 sec  CARDIAC MARKERS ( 2020 11:35 )  x     / x     / 282 U/L / x     / x          Urinalysis Basic - ( 2020 12:17 )    Color: Yellow / Appearance: Clear / S.022 / pH: x  Gluc: x / Ketone: Small  / Bili: Negative / Urobili: Negative   Blood: x / Protein: 30 mg/dL / Nitrite: Negative   Leuk Esterase: Negative / RBC: 11 /hpf / WBC 3 /HPF   Sq Epi: x / Non Sq Epi: 1 /hpf / Bacteria: Negative

## 2020-06-22 NOTE — CONSULT NOTE ADULT - PROBLEM SELECTOR RECOMMENDATION 6
Will continue to f/u for symptoms. Will continue to f/u for symptoms.        Cornelio Garcia MD   Geriatrics and Palliative Care ([facility]) Consult Service    of Geriatric and Palliative Medicine  [facility]      Please page the following number for clinical matters between the hours of 9 am and 5 pm from Monday through Friday : [phone]    After 5pm and on weekends, please see the contact information below:    In the event of newly developing, evolving, or worsening symptoms, please contact the Palliative Medicine team via pager (if the patient is at [facility] #[phone] or if the patient is at [facility] #[phone]) The Geriatric and Palliative Medicine service has coverage 24 hours a day/ 7 days a week to provide medical recommendations regarding symptom management needs via telephone

## 2020-06-22 NOTE — CONSULT NOTE ADULT - PROBLEM SELECTOR RECOMMENDATION 9
Will Start Morphine 1 mg IV q 4 ATC and 1.5 mg q 2 PRN   Due to ? Lymphangitic spread, will start Methylprednisolone 60m g IV q 6   Pulm is following for possible thoracentesis.

## 2020-06-22 NOTE — CONSULT NOTE ADULT - ATTENDING COMMENTS
Above noted   F/U visit with patient now barely able to muster enough energy to speak clearly- moribund  Extensive right breast cancer, having some features of Inflammatory breast Ca  Lymphedema right arm and hand   Palliative dressings advised  GOC discussion had Above noted   F/U visit with patient now barely able to muster enough energy to speak clearly- moribund  Extensive right breast cancer, having some features of Inflammatory breast Ca  Lymphedema right arm and hand   Right brachial vein DVT previously demonstrated  Palliative dressings advised  GOC discussion had

## 2020-06-22 NOTE — CONSULT NOTE ADULT - ASSESSMENT
Impression:    Right breast fungating mass  Radiation dermatitis  Right Upper Extremity lymphedema    Recommend:  1.) Topical therapy: right breast - cleanse gently with sterile water, pat dry, apply acticoat flex3 mesh, cover with DSD every 3 days and PRN if soiled  2.) RUE elevation  3.) Onc follow up    Care as per medicine. Will not follow. Please recall for new issues.  Upon discharge [facility] [address] [place] [address] [phone]  Seen with Dr. Lomas and discussed with clinical nurse  Thank you for this consult  Cecile Renteria, NP-C, CWOCN [id number]

## 2020-06-23 NOTE — PROGRESS NOTE ADULT - SUBJECTIVE AND OBJECTIVE BOX
GAP TEAM PALLIATIVE CARE UNIT PROGRESS NOTE:      [  ] Patient on hospice program.    INDICATION FOR PALLIATIVE CARE UNIT SERVICES:  Symptom management of pain and  SOB in the setting of  Metastatic breast cancer C/B Fungating breast ulcer and  RUE DVT    INTERVAL HPI/OVERNIGHT EVENTS:  Patient seen and examined at bedside, appears in distress, moaning, facial grimacing, labored breathing, . Received morphine 1 mg IVp q 4 ATC and Morphine 1.5 mg IVP q 2 PRN x1          DNR on chart: Yes    Allergies    No Known Allergies    Intolerances    MEDICATIONS  (STANDING):  BACItracin   Ointment 1 Application(s) Topical two times a day  enoxaparin Injectable 70 milliGRAM(s) SubCutaneous two times a day  FIRST- Mouthwash  BLM 15 milliLiter(s) Swish and Spit three times a day  methylPREDNISolone sodium succinate Injectable 60 milliGRAM(s) IV Push every 6 hours  morphine  - Injectable 1 milliGRAM(s) IV Push every 4 hours  pantoprazole    Tablet 40 milliGRAM(s) Oral before breakfast  senna 2 Tablet(s) Oral at bedtime  silver sulfADIAZINE 1% Cream 1 Application(s) Topical daily    MEDICATIONS  (PRN):  bisacodyl Suppository 10 milliGRAM(s) Rectal daily PRN Constipation  glycopyrrolate Injectable 0.4 milliGRAM(s) IV Push every 4 hours PRN secretions  LORazepam   Injectable 0.5 milliGRAM(s) IV Push every 4 hours PRN Anxiety, intractable dyspnea after back to back opioids are given.  morphine  - Injectable 1.5 milliGRAM(s) IV Push every 2 hours PRN Labored breathing, dyspnea, or RR > 28    ITEMS UNCHECKED ARE NOT PRESENT    PRESENT SYMPTOMS: [x ]Unable to obtain due to poor mentation   Source if other than patient:  [ ]Family   [ ]Team     Pain: [x ] yes  [ ] no  QOL impact -   Location -                    Aggravating factors -  Quality -  Radiation -  Timing-  Severity (0-10 scale):  Minimal acceptable level (0-10 scale):     Dyspnea:                           [ ]Mild [ ]Moderate [ ]Severe  Anxiety:                             [ ]Mild [ ]Moderate [ ]Severe  Fatigue:                             [ ]Mild [ ]Moderate [ ]Severe  Nausea:                             [ ]Mild [ ]Moderate [ ]Severe  Loss of appetite:              [ ]Mild [ ]Moderate [ ]Severe  Constipation:                    [ ]Mild [ ]Moderate [ ]Severe    PAINAD Score: 6 ( Facial grimacing, moaning, tense, labored breathing)     [email or web address] (Ctrl +  left click to view)  		  Other Symptoms:  [ ]All other review of systems negative     Palliative Performance Status Version 2:       10  %         [email or web address]  PHYSICAL EXAM:  Vital Signs Last 24 Hrs  T(C): 36.4 (2020 08:43), Max: 37.4 (2020 18:23)  T(F): 97.6 (2020 08:43), Max: 99.3 (2020 18:23)  HR: 117 (2020 08:43) (108 - 124)  BP: 136/81 (2020 08:43) (125/79 - 140/76)  BP(mean): --  RR: 18 (2020 08:43) (18 - 22)  SpO2: 97% (2020 08:43) (96% - 100%) I&O's Summary    2020 07:01  -  2020 07:00  --------------------------------------------------------  IN: 63.5 mL / OUT: 0 mL / NET: 63.5 mL    GENERAL:  [ ]Alert  [ ]Oriented x   [ x]Lethargic  [ ]Cachexia  [ ]Unarousable  [ ]Verbal  [ x]Non-Verbal  Behavioral:   [x ] Anxiety  [ ] Delirium [ ] Agitation [ ] Other  HEENT:  [ ]Normal   [ x]Dry mouth   [ ]ET Tube/Trach  [ ]Oral lesions  PULMONARY:   [ ]Clear [ ]Tachypnea  [ ]Audible excessive secretions   [ ]Rhonchi        [ ]Right [ ]Left [ ]Bilateral  [x ]Crackles        [ ]Right [ ]Left [ ]Bilateral  [ ]Wheezing     [ ]Right [ ]Left [ ]Bilateral  [ x]Diminshed BS [ ]Right [ ]Left [ x]Bilateral    CARDIOVASCULAR:    [ x]Regular [ ]Irregular [ ]Tachy  [ ]Abner [ ]Murmur [ ]Other  GASTROINTESTINAL:  [ x]Soft  [ ]Distended   [ x]+BS  [ ]Non tender [ ]Tender  [ ]PEG [ ]OGT/ NGT   Last BM: None recorded      GENITOURINARY:  [ ]Normal [x ] Incontinent   [ ]Oliguria/Anuria   [ ]Birch  MUSCULOSKELETAL:   [ ]Normal   [x ]Weakness  [ x]Bed/Wheelchair bound [ ]Edema  NEUROLOGIC:   [ ]No focal deficits  [ x] Cognitive impairment  [ ] Dysphagia [ ]Dysarthria [ ] Paresis [ ]Other   SKIN:   [ ]Normal  [ ]Rash    [x] Fungating mass over right breast with areas of erythema, blisters and oozing discharge.  [x] RUE swelling    [ ]Pressure ulcer(s)  [ ]y [ ]n  Present on admission      CRITICAL CARE:  [ ] Shock Present  [ ]Septic [ ]Cardiogenic [ ]Neurologic [ ]Hypovolemic  [ ]  Vasopressors [ ]  Inotropes   [ x] Respiratory failure present [ ] Mechanical Ventilation [ ] Non-invasive ventilatory support [ ] High-Flow  [ x] Acute  [ ] Chronic [ ] Hypoxic  [ ] Hypercarbic [ ] Other  [ x] Other organ failure  skin: fungating Breast mass    LABS:                        8.7    10.21 )-----------( 232      ( 2020 07:09 )             29.3   06-23    145  |  104  |  16  ----------------------------<  135<H>  4.6   |  20<L>  |  0.39<L>    Ca    9.3      2020 07:09  Phos  3.6     06-23  Mg     2.4     06-23    TPro  5.9<L>  /  Alb  2.8<L>  /  TBili  0.4  /  DBili  x   /  AST  72<H>  /  ALT  14  /  AlkPhos  137<H>  06-23  PTT - ( 2020 01:39 )  PTT:65.8 sec        Urinalysis Basic - ( 2020 12:17 )    Color: Yellow / Appearance: Clear / S.022 / pH: x  Gluc: x / Ketone: Small  / Bili: Negative / Urobili: Negative   Blood: x / Protein: 30 mg/dL / Nitrite: Negative   Leuk Esterase: Negative / RBC: 11 /hpf / WBC 3 /HPF   Sq Epi: x / Non Sq Epi: 1 /hpf / Bacteria: Negative      RADIOLOGY & ADDITIONAL STUDIES:    PROTEIN CALORIE MALNUTRITION: [ ] mild [ ] moderate [ ] severe  [ ] underweight [ ] morbid obesity    https://www.andeal.org/vault/5780/web/files/ONC/Table_Clinical%20Characteristics%20to%20Document%20Malnutrition-White%20JV%20et%20al%2020.pdf    Height (cm): 167.6 (20 @ 20:28), 167.6 (19 @ 09:00)  Weight (kg): 73.3 (20 @ 10:53), 74.2 (20 @ 20:28), 71.9 (19 @ 09:00)  BMI (kg/m2): 26.1 (20 @ 10:53), 26.4 (20 @ 20:28), 25.6 (19 @ 09:00)    [ x] PPSV2 < or = 30% [ ] significant weight loss [ ] poor nutritional intake [ ] anasarca Albumin, Serum: 2.8 g/dL (20 @ 07:09)    Artificial Nutrition [ ]     REFERRALS:   [ ]Chaplaincy  [ ] Hospice  [ ]Child Life  [x ]Social Work  [ ]Case management [ ]Holistic Therapy [ ] Physical Therapy [ ] Dietary   Goals of Care Document: GAP TEAM PALLIATIVE CARE UNIT PROGRESS NOTE:      [  ] Patient on hospice program.    INDICATION FOR PALLIATIVE CARE UNIT SERVICES:  Symptom management of pain and  SOB in the setting of  Metastatic breast cancer C/B Fungating breast ulcer and  RUE DVT    INTERVAL HPI/OVERNIGHT EVENTS:  Patient seen and examined at bedside, appears in distress, moaning, facial grimacing, labored breathing, . Received morphine 1 mg IVp q 4 ATC and Morphine 1.5 mg IVP q 2 PRN x1          DNR on chart: Yes    Allergies    No Known Allergies    Intolerances    MEDICATIONS  (STANDING):  BACItracin   Ointment 1 Application(s) Topical two times a day  enoxaparin Injectable 70 milliGRAM(s) SubCutaneous two times a day  FIRST- Mouthwash  BLM 15 milliLiter(s) Swish and Spit three times a day  methylPREDNISolone sodium succinate Injectable 60 milliGRAM(s) IV Push every 6 hours  morphine  - Injectable 1 milliGRAM(s) IV Push every 4 hours  pantoprazole    Tablet 40 milliGRAM(s) Oral before breakfast  senna 2 Tablet(s) Oral at bedtime  silver sulfADIAZINE 1% Cream 1 Application(s) Topical daily    MEDICATIONS  (PRN):  bisacodyl Suppository 10 milliGRAM(s) Rectal daily PRN Constipation  glycopyrrolate Injectable 0.4 milliGRAM(s) IV Push every 4 hours PRN secretions  LORazepam   Injectable 0.5 milliGRAM(s) IV Push every 4 hours PRN Anxiety, intractable dyspnea after back to back opioids are given.  morphine  - Injectable 1.5 milliGRAM(s) IV Push every 2 hours PRN Labored breathing, dyspnea, or RR > 28    ITEMS UNCHECKED ARE NOT PRESENT    PRESENT SYMPTOMS: [x ]Unable to obtain due to poor mentation   Source if other than patient:  [ ]Family   [ ]Team     Pain: [x ] yes  [ ] no  QOL impact -   Location -                    Aggravating factors -  Quality -  Radiation -  Timing-  Severity (0-10 scale):  Minimal acceptable level (0-10 scale):     Dyspnea:                           [ ]Mild [ ]Moderate [ ]Severe  Anxiety:                             [ ]Mild [ ]Moderate [ ]Severe  Fatigue:                             [ ]Mild [ ]Moderate [ ]Severe  Nausea:                             [ ]Mild [ ]Moderate [ ]Severe  Loss of appetite:              [ ]Mild [ ]Moderate [ ]Severe  Constipation:                    [ ]Mild [ ]Moderate [ ]Severe    PAINAD Score: 6 ( Facial grimacing, moaning, tense, labored breathing)     [email or web address] (Ctrl +  left click to view)  		  Other Symptoms:  [ ]All other review of systems negative     Palliative Performance Status Version 2:       10  %         [email or web address]  PHYSICAL EXAM:  Vital Signs Last 24 Hrs  T(C): 36.4 (2020 08:43), Max: 37.4 (2020 18:23)  T(F): 97.6 (2020 08:43), Max: 99.3 (2020 18:23)  HR: 117 (2020 08:43) (108 - 124)  BP: 136/81 (2020 08:43) (125/79 - 140/76)  BP(mean): --  RR: 18 (2020 08:43) (18 - 22)  SpO2: 97% (2020 08:43) (96% - 100%) I&O's Summary    2020 07:01  -  2020 07:00  --------------------------------------------------------  IN: 63.5 mL / OUT: 0 mL / NET: 63.5 mL    GENERAL:  [ ]Alert  [ ]Oriented x   [ x]Lethargic  [ ]Cachexia  [ ]Unarousable  [ ]Verbal  [ x]Non-Verbal  Behavioral:   [x ] Anxiety  [ ] Delirium [ ] Agitation [ ] Other  HEENT:  [ ]Normal   [ x]Dry mouth   [ ]ET Tube/Trach  [ ]Oral lesions  PULMONARY:   [ ]Clear [ ]Tachypnea  [ ]Audible excessive secretions   [ ]Rhonchi        [ ]Right [ ]Left [ ]Bilateral  [x ]Crackles        [ ]Right [ ]Left [ ]Bilateral  [ ]Wheezing     [ ]Right [ ]Left [ ]Bilateral  [ x]Diminshed BS [ ]Right [ ]Left [ x]Bilateral    CARDIOVASCULAR:    [ x]Regular [ ]Irregular [ ]Tachy  [ ]Abner [ ]Murmur [ ]Other  GASTROINTESTINAL:  [ x]Soft  [ ]Distended   [ x]+BS  [ ]Non tender [ ]Tender  [ ]PEG [ ]OGT/ NGT   Last BM: None recorded      GENITOURINARY:  [ ]Normal [x ] Incontinent   [ ]Oliguria/Anuria   [ ]Birch  MUSCULOSKELETAL:   [ ]Normal   [x ]Weakness  [ x]Bed/Wheelchair bound [ ]Edema  NEUROLOGIC:   [ ]No focal deficits  [ x] Cognitive impairment  [ ] Dysphagia [ ]Dysarthria [ ] Paresis [ ]Other   SKIN:   [ ]Normal  [ ]Rash    [x] Fungating mass over right breast with areas of erythema, blisters and oozing discharge.  [x] RUE swelling    [ ]Pressure ulcer(s)  [ ]y [ ]n  Present on admission      CRITICAL CARE:  [ ] Shock Present  [ ]Septic [ ]Cardiogenic [ ]Neurologic [ ]Hypovolemic  [ ]  Vasopressors [ ]  Inotropes   [ x] Respiratory failure present [ ] Mechanical Ventilation [ ] Non-invasive ventilatory support [ ] High-Flow  [ x] Acute  [ ] Chronic [ ] Hypoxic  [ ] Hypercarbic [ ] Other  [ x] Other organ failure  skin: fungating Breast mass    LABS:                        8.7    10.21 )-----------( 232      ( 2020 07:09 )             29.3   06-23    145  |  104  |  16  ----------------------------<  135<H>  4.6   |  20<L>  |  0.39<L>    Ca    9.3      2020 07:09  Phos  3.6     06-23  Mg     2.4     06-23    TPro  5.9<L>  /  Alb  2.8<L>  /  TBili  0.4  /  DBili  x   /  AST  72<H>  /  ALT  14  /  AlkPhos  137<H>  06-23  PTT - ( 2020 01:39 )  PTT:65.8 sec        Urinalysis Basic - ( 2020 12:17 )    Color: Yellow / Appearance: Clear / S.022 / pH: x  Gluc: x / Ketone: Small  / Bili: Negative / Urobili: Negative   Blood: x / Protein: 30 mg/dL / Nitrite: Negative   Leuk Esterase: Negative / RBC: 11 /hpf / WBC 3 /HPF   Sq Epi: x / Non Sq Epi: 1 /hpf / Bacteria: Negative      RADIOLOGY & ADDITIONAL STUDIES:  EXAM:  CT BRAIN                        PROCEDURE DATE:  2020        IMPRESSION:     Redemonstration volume loss, microvascular disease, no obvious large hemorrhage or midline shift, slightly slightly increased attenuation in right corona radiata with vasogenic edema, possibly metastatic disease, in patient with concern for metastasis, MRI with contrast may be more sensitive and obtained for improved assessment as clinically warranted.           EXAM:  CT ANGIO CHEST (W)AW IC                        PROCEDURE DATE:  2020      IMPRESSION:   No pulmonary embolism.    A large loculated right pleural effusion, increased from small on 2020, with complete atelectasis of the right lower lobe and partial atelectasis of the right upper and middle lobes. A new small left pleural effusion.    Recurrent invasive right breast cancer with extensive metastatic disease involving the right chest wall soft tissues and musculature. Also extensive metastases above and below the diaphragm including lymph node metastases and hepatic metastases.    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    Interval increase in partially imaged right upper extremity edema since prior.    Osseous metastatic disease with mild worsening of T2 and L2 pathologic compression deformities since 2020.          EXAM:  XR CHEST AP OR PA 1V                          PROCEDURE DATE:  2020        IMPRESSION: Redemonstrated loculated right pleural effusion, similar to prior.        PROTEIN CALORIE MALNUTRITION: [ ] mild [ ] moderate [ ] severe  [ ] underweight [ ] morbid obesity    https://www.andeal.org/vault/2440/web/files/ONC/Table_Clinical%20Characteristics%20to%20Document%20Malnutrition-White%20JV%20et%20al%2020.pdf    Height (cm): 167.6 (20 @ 20:28), 167.6 (19 @ 09:00)  Weight (kg): 73.3 (20 @ 10:53), 74.2 (20 @ 20:28), 71.9 (19 @ 09:00)  BMI (kg/m2): 26.1 (20 @ 10:53), 26.4 (20 @ 20:28), 25.6 (19 @ 09:00)    [ x] PPSV2 < or = 30% [ ] significant weight loss [ ] poor nutritional intake [ ] anasarca Albumin, Serum: 2.8 g/dL (20 @ 07:09)    Artificial Nutrition [ ]     REFERRALS:   [ ]Chaplaincy  [ ] Hospice  [ ]Child Life  [x ]Social Work  [ ]Case management [ ]Holistic Therapy [ ] Physical Therapy [ ] Dietary   Goals of Care Document: GAP TEAM PALLIATIVE CARE UNIT PROGRESS NOTE:      [  ] Patient on hospice program.    INDICATION FOR PALLIATIVE CARE UNIT SERVICES:  Symptom management of pain and  SOB in the setting of  Metastatic breast cancer C/B Fungating breast ulcer and  RUE DVT    INTERVAL HPI/OVERNIGHT EVENTS:  Patient seen and examined at bedside, appears in distress, moaning, facial grimacing, labored breathing, . Received morphine 1 mg IVp q 4 ATC and Morphine 1.5 mg IVP q 2 PRN x1          DNR on chart: Yes    Allergies    No Known Allergies    Intolerances    MEDICATIONS  (STANDING):  BACItracin   Ointment 1 Application(s) Topical two times a day  enoxaparin Injectable 70 milliGRAM(s) SubCutaneous two times a day  FIRST- Mouthwash  BLM 15 milliLiter(s) Swish and Spit three times a day  methylPREDNISolone sodium succinate Injectable 60 milliGRAM(s) IV Push every 6 hours  morphine  - Injectable 1 milliGRAM(s) IV Push every 4 hours  pantoprazole    Tablet 40 milliGRAM(s) Oral before breakfast  senna 2 Tablet(s) Oral at bedtime  silver sulfADIAZINE 1% Cream 1 Application(s) Topical daily    MEDICATIONS  (PRN):  bisacodyl Suppository 10 milliGRAM(s) Rectal daily PRN Constipation  glycopyrrolate Injectable 0.4 milliGRAM(s) IV Push every 4 hours PRN secretions  LORazepam   Injectable 0.5 milliGRAM(s) IV Push every 4 hours PRN Anxiety, intractable dyspnea after back to back opioids are given.  morphine  - Injectable 1.5 milliGRAM(s) IV Push every 2 hours PRN Labored breathing, dyspnea, or RR > 28    ITEMS UNCHECKED ARE NOT PRESENT    PRESENT SYMPTOMS: [x ]Unable to obtain due to poor mentation   Source if other than patient:  [ ]Family   [ ]Team     Pain: [x ] yes  ( see pain AD score)  [ ] no  QOL impact -   Location -                    Aggravating factors -  Quality -  Radiation -  Timing-  Severity (0-10 scale):  Minimal acceptable level (0-10 scale):     Dyspnea:                           [ ]Mild [ ]Moderate [ ]Severe  Anxiety:                             [ ]Mild [ ]Moderate [ ]Severe  Fatigue:                             [ ]Mild [ ]Moderate [ ]Severe  Nausea:                             [ ]Mild [ ]Moderate [ ]Severe  Loss of appetite:              [ ]Mild [ ]Moderate [ ]Severe  Constipation:                    [ ]Mild [ ]Moderate [ ]Severe    PAINAD Score: 6 ( Facial grimacing, moaning, tense, labored breathing)     [email or web address] (Ctrl +  left click to view)  		  Other Symptoms:  [ ]All other review of systems negative     Palliative Performance Status Version 2:       10  %         [email or web address]  PHYSICAL EXAM:  Vital Signs Last 24 Hrs  T(C): 36.4 (2020 08:43), Max: 37.4 (2020 18:23)  T(F): 97.6 (2020 08:43), Max: 99.3 (2020 18:23)  HR: 117 (2020 08:43) (108 - 124)  BP: 136/81 (2020 08:43) (125/79 - 140/76)  BP(mean): --  RR: 18 (2020 08:43) (18 - 22)  SpO2: 97% (2020 08:43) (96% - 100%) I&O's Summary    2020 07:01  -  2020 07:00  --------------------------------------------------------  IN: 63.5 mL / OUT: 0 mL / NET: 63.5 mL    GENERAL:  [ ]Alert  [ ]Oriented x   [ x]Lethargic  [ ]Cachexia  [ ]Unarousable  [ ]Verbal  [ x]Non-Verbal  Behavioral:   [x ] Anxiety  [ ] Delirium [ ] Agitation [ ] Other  HEENT:  [ ]Normal   [ x]Dry mouth   [ ]ET Tube/Trach  [ ]Oral lesions  PULMONARY:   [ ]Clear [ ]Tachypnea  [ ]Audible excessive secretions   [ ]Rhonchi        [ ]Right [ ]Left [ ]Bilateral  [x ]Crackles        [ ]Right [ ]Left [ ]Bilateral  [ ]Wheezing     [ ]Right [ ]Left [ ]Bilateral  [ x]Diminshed BS [ ]Right [ ]Left [ x]Bilateral    CARDIOVASCULAR:    [ x]Regular [ ]Irregular [ ]Tachy  [ ]Abner [ ]Murmur [ ]Other  GASTROINTESTINAL:  [ x]Soft  [ ]Distended   [ x]+BS  [ ]Non tender [ ]Tender  [ ]PEG [ ]OGT/ NGT   Last BM: None recorded      GENITOURINARY:  [ ]Normal [x ] Incontinent   [ ]Oliguria/Anuria   [ ]Birch  MUSCULOSKELETAL:   [ ]Normal   [x ]Weakness  [ x]Bed/Wheelchair bound [ ]Edema  NEUROLOGIC:   [ ]No focal deficits  [ x] Cognitive impairment  [ ] Dysphagia [ ]Dysarthria [ ] Paresis [ ]Other   SKIN:   [ ]Normal  [ ]Rash    [x] Fungating mass over right breast with areas of erythema, blisters and oozing discharge.  [x] RUE swelling    [ ]Pressure ulcer(s)  [ ]y [ ]n  Present on admission      CRITICAL CARE:  [ ] Shock Present  [ ]Septic [ ]Cardiogenic [ ]Neurologic [ ]Hypovolemic  [ ]  Vasopressors [ ]  Inotropes   [ x] Respiratory failure present [ ] Mechanical Ventilation [ ] Non-invasive ventilatory support [ ] High-Flow  [ x] Acute  [ ] Chronic [ ] Hypoxic  [ ] Hypercarbic [ ] Other  [ x] Other organ failure  skin: fungating Breast mass    LABS:                        8.7    10.21 )-----------( 232      ( 2020 07:09 )             29.3   06-23    145  |  104  |  16  ----------------------------<  135<H>  4.6   |  20<L>  |  0.39<L>    Ca    9.3      2020 07:09  Phos  3.6     06-23  Mg     2.4     06-23    TPro  5.9<L>  /  Alb  2.8<L>  /  TBili  0.4  /  DBili  x   /  AST  72<H>  /  ALT  14  /  AlkPhos  137<H>  06-23  PTT - ( 2020 01:39 )  PTT:65.8 sec        Urinalysis Basic - ( 2020 12:17 )    Color: Yellow / Appearance: Clear / S.022 / pH: x  Gluc: x / Ketone: Small  / Bili: Negative / Urobili: Negative   Blood: x / Protein: 30 mg/dL / Nitrite: Negative   Leuk Esterase: Negative / RBC: 11 /hpf / WBC 3 /HPF   Sq Epi: x / Non Sq Epi: 1 /hpf / Bacteria: Negative      RADIOLOGY & ADDITIONAL STUDIES:  EXAM:  CT BRAIN                        PROCEDURE DATE:  2020        IMPRESSION:     Redemonstration volume loss, microvascular disease, no obvious large hemorrhage or midline shift, slightly slightly increased attenuation in right corona radiata with vasogenic edema, possibly metastatic disease, in patient with concern for metastasis, MRI with contrast may be more sensitive and obtained for improved assessment as clinically warranted.           EXAM:  CT ANGIO CHEST (W)AW IC                        PROCEDURE DATE:  2020      IMPRESSION:   No pulmonary embolism.    A large loculated right pleural effusion, increased from small on 2020, with complete atelectasis of the right lower lobe and partial atelectasis of the right upper and middle lobes. A new small left pleural effusion.    Recurrent invasive right breast cancer with extensive metastatic disease involving the right chest wall soft tissues and musculature. Also extensive metastases above and below the diaphragm including lymph node metastases and hepatic metastases.    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    Interval increase in partially imaged right upper extremity edema since prior.    Osseous metastatic disease with mild worsening of T2 and L2 pathologic compression deformities since 2020.          EXAM:  XR CHEST AP OR PA 1V                          PROCEDURE DATE:  2020        IMPRESSION: Redemonstrated loculated right pleural effusion, similar to prior.        PROTEIN CALORIE MALNUTRITION: [ ] mild [ ] moderate [ ] severe  [ ] underweight [ ] morbid obesity    https://www.andeal.org/vault/2440/web/files/ONC/Table_Clinical%20Characteristics%20to%20Document%20Malnutrition-White%20JV%20et%20al%2020.pdf    Height (cm): 167.6 (20 @ 20:28), 167.6 (19 @ 09:00)  Weight (kg): 73.3 (20 @ 10:53), 74.2 (20 @ 20:28), 71.9 (19 @ 09:00)  BMI (kg/m2): 26.1 (20 @ 10:53), 26.4 (20 @ 20:28), 25.6 (19 @ 09:00)    [ x] PPSV2 < or = 30% [ ] significant weight loss [ ] poor nutritional intake [ ] anasarca Albumin, Serum: 2.8 g/dL (20 @ 07:09)    Artificial Nutrition [ ]     REFERRALS:   [ ]Chaplaincy  [ ] Hospice  [ ]Child Life  [x ]Social Work  [ ]Case management [ ]Holistic Therapy [ ] Physical Therapy [ ] Dietary   Goals of Care Document: GAP TEAM PALLIATIVE CARE UNIT PROGRESS NOTE:      [  ] Patient on hospice program.    INDICATION FOR PALLIATIVE CARE UNIT SERVICES:  Symptom management of pain and  SOB in the setting of  Metastatic breast cancer C/B Fungating breast ulcer and  RUE DVT    INTERVAL HPI/OVERNIGHT EVENTS:  Patient seen and examined at bedside, appears in distress, moaning, facial grimacing, labored breathing, . Received morphine 1 mg IVp q 4 ATC and Morphine 1.5 mg IVP q 2 PRN x1          DNR on chart: Yes    Allergies    No Known Allergies    Intolerances    MEDICATIONS  (STANDING):  BACItracin   Ointment 1 Application(s) Topical two times a day  enoxaparin Injectable 70 milliGRAM(s) SubCutaneous two times a day  FIRST- Mouthwash  BLM 15 milliLiter(s) Swish and Spit three times a day  methylPREDNISolone sodium succinate Injectable 60 milliGRAM(s) IV Push every 6 hours  morphine  - Injectable 1 milliGRAM(s) IV Push every 4 hours  pantoprazole    Tablet 40 milliGRAM(s) Oral before breakfast  senna 2 Tablet(s) Oral at bedtime  silver sulfADIAZINE 1% Cream 1 Application(s) Topical daily    MEDICATIONS  (PRN):  bisacodyl Suppository 10 milliGRAM(s) Rectal daily PRN Constipation  glycopyrrolate Injectable 0.4 milliGRAM(s) IV Push every 4 hours PRN secretions  LORazepam   Injectable 0.5 milliGRAM(s) IV Push every 4 hours PRN Anxiety, intractable dyspnea after back to back opioids are given.  morphine  - Injectable 1.5 milliGRAM(s) IV Push every 2 hours PRN Labored breathing, dyspnea, or RR > 28    ITEMS UNCHECKED ARE NOT PRESENT    PRESENT SYMPTOMS: [x ]Unable to obtain due to poor mentation   Source if other than patient:  [ ]Family   [ ]Team     Pain: [x ] yes  ( see pain AD score)  [ ] no  QOL impact -   Location -                    Aggravating factors -  Quality -  Radiation -  Timing-  Severity (0-10 scale):  Minimal acceptable level (0-10 scale):     Dyspnea:                           [ ]Mild [ ]Moderate [ ]Severe  Anxiety:                             [ ]Mild [ ]Moderate [ ]Severe  Fatigue:                             [ ]Mild [ ]Moderate [ ]Severe  Nausea:                             [ ]Mild [ ]Moderate [ ]Severe  Loss of appetite:              [ ]Mild [ ]Moderate [ ]Severe  Constipation:                    [ ]Mild [ ]Moderate [ ]Severe    PAINAD Score: 6 ( Facial grimacing, moaning, tense, labored breathing)     [email or web address] (Ctrl +  left click to view)  		  Other Symptoms:  [ ]All other review of systems negative     Palliative Performance Status Version 2:       10  %         [email or web address]  PHYSICAL EXAM:  Vital Signs Last 24 Hrs  T(C): 36.4 (23 Jun 2020 08:43), Max: 37.4 (22 Jun 2020 18:23)  T(F): 97.6 (23 Jun 2020 08:43), Max: 99.3 (22 Jun 2020 18:23)  HR: 117 (23 Jun 2020 08:43) (108 - 124)  BP: 136/81 (23 Jun 2020 08:43) (125/79 - 140/76)  BP(mean): --  RR: 18 (23 Jun 2020 08:43) (18 - 22)  SpO2: 97% (23 Jun 2020 08:43) (96% - 100%) I&O's Summary    22 Jun 2020 07:01  -  23 Jun 2020 07:00  --------------------------------------------------------  IN: 63.5 mL / OUT: 0 mL / NET: 63.5 mL    GENERAL:  [ ]Alert  [ ]Oriented x   [ x]Lethargic  [ ]Cachexia  [ ]Unarousable  [ ]Verbal  [ x]Non-Verbal  Behavioral:   [x ] Anxiety  [ ] Delirium [ ] Agitation [ ] Other  HEENT:  [ ]Normal   [ x]Dry mouth   [ ]ET Tube/Trach  [ ]Oral lesions  PULMONARY:   [ ]Clear [ ]Tachypnea  [ ]Audible excessive secretions   [ ]Rhonchi        [ ]Right [ ]Left [ ]Bilateral  [x ]Crackles        [ ]Right [ ]Left [ ]Bilateral  [ ]Wheezing     [ ]Right [ ]Left [ ]Bilateral  [ x]Diminshed BS [ ]Right [ ]Left [ x]Bilateral    CARDIOVASCULAR:    [ x]Regular [ ]Irregular [ ]Tachy  [ ]Abner [ ]Murmur [ ]Other  GASTROINTESTINAL:  [ x]Soft  [ ]Distended   [ x]+BS  [ ]Non tender [ ]Tender  [ ]PEG [ ]OGT/ NGT   Last BM: None recorded      GENITOURINARY:  [ ]Normal [x ] Incontinent   [ ]Oliguria/Anuria   [ ]Birch  MUSCULOSKELETAL:   [ ]Normal   [x ]Weakness  [ x]Bed/Wheelchair bound [ ]Edema  NEUROLOGIC:   [ ]No focal deficits  [ x] Cognitive impairment  [ ] Dysphagia [ ]Dysarthria [ ] Paresis [ ]Other   SKIN:   [ ]Normal  [ ]Rash    [x] Fungating mass over right breast with areas of erythema, blisters and oozing discharge.  [x] RUE swelling    [ ]Pressure ulcer(s)  [ ]y [ ]n  Present on admission      CRITICAL CARE:  [ ] Shock Present  [ ]Septic [ ]Cardiogenic [ ]Neurologic [ ]Hypovolemic  [ ]  Vasopressors [ ]  Inotropes   [ x] Respiratory failure present [ ] Mechanical Ventilation [ ] Non-invasive ventilatory support [ ] High-Flow  [ x] Acute  [ ] Chronic [ ] Hypoxic  [ ] Hypercarbic [ ] Other  [ x] Other organ failure  skin: fungating Breast mass    LABS:                        8.7    10.21 )-----------( 232      ( 23 Jun 2020 07:09 )             29.3   06-23    145  |  104  |  16  ----------------------------<  135<H>  4.6   |  20<L>  |  0.39<L>    Ca    9.3      23 Jun 2020 07:09  Phos  3.6     06-23  Mg     2.4     06-23    TPro  5.9<L>  /  Alb  2.8<L>  /  TBili  0.4  /  DBili  x   /  AST  72<H>  /  ALT  14  /  AlkPhos  137<H>  06-23  PTT - ( 23 Jun 2020 01:39 )  PTT:65.8 sec        Radiology and other studies: None new.       PROTEIN CALORIE MALNUTRITION: [ ] mild [ ] moderate [ ] severe  [ ] underweight [ ] morbid obesity    https://www.andeal.org/vault/8770/web/files/ONC/Table_Clinical%20Characteristics%20to%20Document%20Malnutrition-White%20JV%20et%20al%202012.pdf    Height (cm): 167.6 (06-03-20 @ 20:28), 167.6 (09-17-19 @ 09:00)  Weight (kg): 73.3 (06-21-20 @ 10:53), 74.2 (06-03-20 @ 20:28), 71.9 (09-17-19 @ 09:00)  BMI (kg/m2): 26.1 (06-21-20 @ 10:53), 26.4 (06-03-20 @ 20:28), 25.6 (09-17-19 @ 09:00)    [ x] PPSV2 < or = 30% [ ] significant weight loss [ ] poor nutritional intake [ ] anasarca Albumin, Serum: 2.8 g/dL (06-23-20 @ 07:09)    Artificial Nutrition [ ]     REFERRALS:   [ x]Chaplaincy  [ ] Hospice  [ ]Child Life  [x ]Social Work  [ ]Case management [ ]Holistic Therapy [ ] Physical Therapy [ ] Dietary   Goals of Care Document:

## 2020-06-23 NOTE — PROGRESS NOTE ADULT - PROBLEM SELECTOR PLAN 7
patient with metastatic  breast cancer (diagnosed in 2018.) Mets to bone with malignant pleural effusion. hepatic meats, right Lung Lymphaginitic Carcinomatosis. Right fungating breast mass, RUE DVT ( on Lovenox) here for symptom management.   spoke to Anh Hui, [phone], who is the HCP. expressed the need for keeping patient comfortable and free of pain.   Pending pulmonary evaluation for Thoracocentesis vs Pleurx patient with metastatic  breast cancer (diagnosed in 2018.) Mets to bone with malignant pleural effusion. hepatic meats, right Lung Lymphaginitic Carcinomatosis. Right fungating breast mass, RUE DVT ( on Lovenox) here for symptom management.   spoke to Anh at bedside Korina [phone], who is the HCP. expressed the need for keeping patient comfortable at this time and free of pain.  Anh expressed that patient has a lives with her son and 2 of her siblings, expressed how loving patient is to her and her family, always cooking for them and bringing it over. Patient recognizes Anh at bedside, minimally verbal stating that she is dying.  emotional support provided.   Pending pulmonary evaluation for Thoracocentesis vs Pleurx spoke to Anh at bedside Korina [phone], who is the HCP. expressed the need for keeping patient comfortable at this time and free of pain.  Anh expressed that patient has a lives with her son and 2 of her siblings, expressed how loving patient is to her and her family, always cooking for them and bringing it over. Anh understands that patient is at end of life , very tearful at bedside again emphasizing that all she wants id for patient to be free of pain. Patient recognizes sister Anh at bedside, "minimally verbal stating that I am dying"  emotional support provided.   Pending pulmonary evaluation for Thoracocentesis vs Pleurx

## 2020-06-23 NOTE — PROGRESS NOTE ADULT - PROBLEM SELECTOR PLAN 1
Likely 2/2 metastatic breast ca with R. Breast fungating ulcer  C/W Morphine 1mg q 4 ATC and 1.5 mg q 1 PRN

## 2020-06-23 NOTE — PROGRESS NOTE ADULT - PROBLEM SELECTOR PLAN 3
Pending pulmonary evaluation for Thoracocentesis vs Pleurx Large loculated R. pleural effusion and small left pleural effusion.  Pending pulmonary evaluation for Thoracocentesis vs Pleurx Large loculated R. pleural effusion and small left pleural effusion.  Pending pulmonary evaluation for Thoracocentesis vs Pleurx vs more conservative management with medication

## 2020-06-23 NOTE — PROGRESS NOTE ADULT - PROBLEM SELECTOR PLAN 5
Extreme swelling in the RUE  RUE DVT  on lovenox 70 mg BID Extreme swelling in the RUE  RUE DVT  on Lovenox 70 mg BID CT chest with metastatic breast ca with R. Breast fungating ulcer, osseous  mets large right Pleural effusion, small left pleural effusion.  Performance status prohibits DDT at this time.

## 2020-06-23 NOTE — PROGRESS NOTE ADULT - PROBLEM SELECTOR PLAN 4
C/W Morphine 1 mg IV q 4 ATC and 1.5 mg q 2 PRN   C/W nasal canula oxygen 3 L  Due to ? Lymphangitic spread, will start Methylprednisolone 60m g IV q 6   Pulm is following for possible thoracentesis Vs pleurX Extreme swelling in the RUE  RUE DVT  on Lovenox 70 mg BID

## 2020-06-23 NOTE — PROGRESS NOTE ADULT - PROBLEM SELECTOR PLAN 6
CT chest with metastatic breast ca with R. Breast fungating ulcer, osseous  mets large right Pleural effusion, small left pleural effusion.  No DMT spoke to Anh at bedside Korina [phone], who is the HCP. expressed the need for keeping patient comfortable at this time and free of pain.  Anh expressed that patient has a lives with her son and 2 of her siblings, expressed how loving patient is to her and her family, always cooking for them and bringing it over. Anh understands that patient is at end of life , very tearful at bedside again emphasizing that all she wants id for patient to be free of pain. Patient recognizes sister Anh at bedside, "minimally verbal stating that I am dying"  emotional support provided.   Pending pulmonary evaluation for Thoracocentesis vs Pleurx

## 2020-06-23 NOTE — CHART NOTE - NSCHARTNOTEFT_GEN_A_CORE
Patient seen for fitting and delivery of LSO. Impression is that she is not a candidate for bracing at this time. Contact information left with instruction to notify office if situation changes.  Sudeep FORBES.  [facility]  [phone]

## 2020-06-23 NOTE — PROGRESS NOTE ADULT - PROBLEM SELECTOR PLAN 2
likely 2/2 to metastatic disease and malignant pleural effusion  C/W morphine ATC   C/W nasal canula oxygen 3L  pending evaluation for thoracentesis Vs pleurx

## 2020-06-24 NOTE — DIETITIAN INITIAL EVALUATION ADULT. - REASON INDICATOR FOR ASSESSMENT
per discussion with palliative NP pt is not appropriate for nutrition assessment at this time, pt remains NPO. RD to be available as needed.

## 2020-06-24 NOTE — PROGRESS NOTE ADULT - ASSESSMENT
69 year old female with metastatic  breast cancer (diagnosed in 2018. Mets to bone with malignant pleural effusion. Also with fungating wound and extensive metastases above and below the diaphragm including lymph node metastases and hepatic metastases. 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 Furthermore with ? Brain mets) s/p chemotherapy and radiation treatment ( last session about 6 months prior to admission),  anemia, MVP, presents for shortness of breath. During her recent admission she was undecided and in fact hesitant about getting DMT and did not want to address codes status. However, she assigned her "... very close family friend (often referred to as her "sister") Anh Meridalary, [phone], as sole proxy, and did not identify any alternate." She is now DNR/I based on the HCP inputs.  Palliative consulted for symptom management of paid/ dyspnea and GOC 69 year old female with metastatic  breast cancer (diagnosed in 2018. Mets to bone with malignant pleural effusion. Also with fungating wound and extensive metastases above and below the diaphragm including lymph node metastases and hepatic metastases. 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 Furthermore with ? Brain mets) s/p chemotherapy and radiation treatment ( last session about 6 months prior to admission),  anemia, MVP, presents for shortness of breath. During her recent admission she was undecided and in fact hesitant about getting DMT and did not want to address codes status. However, she assigned her "... very close family friend (often referred to as her "sister") Anh Meridalary, [phone], as sole proxy, and did not identify any alternate." She is now DNR/I based on the HCP inputs.  Patient is in PCU for end of life care - management of pain and dyspnea.

## 2020-06-24 NOTE — PROGRESS NOTE ADULT - PROBLEM SELECTOR PLAN 7
Updated sister Anh Villalta(HCP)  [phone], that patient has been started on a Morphine drip to keep her comfortable as had required multiple doses of PRN's in the past 24 hours and still not comfortable. She agreed to inpatient hospice referral. also explained to sister that patient has entered the dying process. she expresses understanding and emotional support provided.  Hospice referral made.

## 2020-06-24 NOTE — DIETITIAN INITIAL EVALUATION ADULT. - PROBLEM SELECTOR PLAN 7
DVT : AC as above, SCD  Psych: c/w home modafinil  GI: cw home protonix, Senna bowel regimen  Diet: NPO while on BIPAP, MVI

## 2020-06-24 NOTE — DIETITIAN INITIAL EVALUATION ADULT. - PROBLEM SELECTOR PLAN 6
- Hold therapeutic LVX in setting of possible thoracentesis  - start heparin gtt given benefit of DVT ppx outweight risk of hemorrhagic transformation of brain metastasis  - No bolus of heparin  - monitor for compartment syndrome, wrist splint removed.

## 2020-06-24 NOTE — PROGRESS NOTE ADULT - PROBLEM SELECTOR PLAN 5
Extreme swelling in the RUE  RUE DVT  on Lovenox 70 mg BID CT chest with metastatic breast ca with R. Breast fungating ulcer, osseous  mets large right Pleural effusion, small left pleural effusion.  No DMT

## 2020-06-24 NOTE — PROGRESS NOTE ADULT - SUBJECTIVE AND OBJECTIVE BOX
GAP TEAM PALLIATIVE CARE UNIT PROGRESS NOTE:      [  ] Patient on hospice program.    INDICATION FOR PALLIATIVE CARE UNIT SERVICES:  Symptom management of pain and  SOB in the setting of  Metastatic breast cancer C/B Fungating breast ulcer and  RUE DVT    INTERVAL HPI/OVERNIGHT EVENTS: Patient seen and examined at bedside, appears uncomfortable, labored breathing, moaning,  . Received morphine 1 mg IVp q 4 ATC and Morphine 1.5 mg to 2 mg IVP ( Total of 14.5mg / 24 hours)     Will transition to a morphine drip @ 0.5 mg /hr with Breakthrough of 2 mg ivp PRN      DNR on chart: Yes    Allergies    No Known Allergies    Intolerances    MEDICATIONS  (STANDING):  BACItracin   Ointment 1 Application(s) Topical two times a day  enoxaparin Injectable 70 milliGRAM(s) SubCutaneous two times a day  methylPREDNISolone sodium succinate Injectable 60 milliGRAM(s) IV Push every 6 hours  morphine  Infusion 0.5 mG/Hr (0.5 mL/Hr) IV Continuous <Continuous>  pantoprazole  Injectable 40 milliGRAM(s) IV Push daily  silver sulfADIAZINE 1% Cream 1 Application(s) Topical daily    MEDICATIONS  (PRN):  bisacodyl Suppository 10 milliGRAM(s) Rectal daily PRN Constipation  glycopyrrolate Injectable 0.4 milliGRAM(s) IV Push every 4 hours PRN secretions  LORazepam   Injectable 0.5 milliGRAM(s) IV Push every 4 hours PRN Anxiety, intractable dyspnea after back to back opioids are given.  morphine  - Injectable 2 milliGRAM(s) IV Push every 1 hour PRN Dyspnea  morphine  - Injectable 2 milliGRAM(s) IV Push every 1 hour PRN Pain    ITEMS UNCHECKED ARE NOT PRESENT    PRESENT SYMPTOMS: [ x]Unable to obtain due to poor mentation   Source if other than patient:  [ ]Family   [ ]Team     Pain: [x ] yes  ( see pain AD score)   [ ] no  QOL impact -   Location -                    Aggravating factors -  Quality -  Radiation -  Timing-  Severity (0-10 scale):  Minimal acceptable level (0-10 scale):     Dyspnea:                           [ ]Mild [ ]Moderate [ ]Severe  Anxiety:                             [ ]Mild [ ]Moderate [ ]Severe  Fatigue:                             [ ]Mild [ ]Moderate [ ]Severe  Nausea:                             [ ]Mild [ ]Moderate [ ]Severe  Loss of appetite:              [ ]Mild [ ]Moderate [ ]Severe  Constipation:                    [ ]Mild [ ]Moderate [ ]Severe    PAINAD Score: ranges from 0-6 in the past 24 hours ( labored breathing, frightened, sad, moaning, unable to console)     [email or web address] (Ctrl +  left click to view)  		  Other Symptoms:  [ ]All other review of systems negative     Palliative Performance Status Version 2:       10  %         [email or web address]_[email or web address]  PHYSICAL EXAM:  Vital Signs Last 24 Hrs  T(C): 36.6 (24 Jun 2020 08:47), Max: 36.6 (24 Jun 2020 08:47)  T(F): 97.9 (24 Jun 2020 08:47), Max: 97.9 (24 Jun 2020 08:47)  HR: 121 (24 Jun 2020 08:47) (121 - 121)  BP: 147/88 (24 Jun 2020 08:47) (147/88 - 147/88)  BP(mean): --  RR: 24 (24 Jun 2020 08:47) (24 - 24)  SpO2: 97% (24 Jun 2020 08:47) (97% - 97%) I&O's Summary    23 Jun 2020 07:01  -  24 Jun 2020 07:00  --------------------------------------------------------  IN: 0 mL / OUT: 700 mL / NET: -700 mL    GENERAL:  [ ]Alert  [ ]Oriented x   [x ]Lethargic  [ ]Cachexia  [ ]Unarousable  [ ]Verbal  [x ]Non-Verbal  Behavioral:   [x ] Anxiety  [ ] Delirium [ ] Agitation [ ] Other  HEENT:  [ ]Normal   [ x]Dry mouth   [ ]ET Tube/Trach  [ ]Oral lesions  PULMONARY:   [x ]Clear [ ]Tachypnea  [ ]Audible excessive secretions   [ ]Rhonchi        [ ]Right [ ]Left [ ]Bilateral  [x ]Crackles        [ ]Right [ ]Left [ ]Bilateral  [ ]Wheezing     [ ]Right [ ]Left [ ]Bilateral  [x ]Diminshed BS [ ]Right [ ]Left [ ]Bilateral    CARDIOVASCULAR:    [x ]Regular [ ]Irregular [ ]Tachy  [ ]Baner [ ]Murmur [ ]Other  GASTROINTESTINAL:  [x ]Soft  [ ]Distended   [x ]+BS  [ ]Non tender [ ]Tender  [ ]PEG [ ]OGT/ NGT   Last BM: None ( dulcolax)        GENITOURINARY:  [ ]Normal [x ] Incontinent   [ ]Oliguria/Anuria   [ ]Birch  MUSCULOSKELETAL:   [ ]Normal   [x ]Weakness  [x ]Bed/Wheelchair bound [ ]Edema  NEUROLOGIC:   [ ]No focal deficits  [x ] Cognitive impairment  [ ] Dysphagia [ ]Dysarthria [ ] Paresis [ ]Other   SKIN:   [x] Fungating mass over right breast with areas of erythema, blisters and oozing discharge.  [x] RUE swelling  [ ]Pressure ulcer(s)  [ ]y [ ]n  Present on admission      CRITICAL CARE:  [ ] Shock Present  [ ]Septic [ ]Cardiogenic [ ]Neurologic [ ]Hypovolemic  [ ]  Vasopressors [ ]  Inotropes   [x ] Respiratory failure present [ ] Mechanical Ventilation [ ] Non-invasive ventilatory support [ ] High-Flow  [ x] Acute  [ x] Chronic [ ] Hypoxic  [ ] Hypercarbic [ ] Other  [ x] Other organ failure   skin: fungating Breast mass      LABS:                        8.7    10.21 )-----------( 232      ( 23 Jun 2020 07:09 )             29.3   06-23    145  |  104  |  16  ----------------------------<  135<H>  4.6   |  20<L>  |  0.39<L>    Ca    9.3      23 Jun 2020 07:09  Phos  3.6     06-23  Mg     2.4     06-23    TPro  5.9<L>  /  Alb  2.8<L>  /  TBili  0.4  /  DBili  x   /  AST  72<H>  /  ALT  14  /  AlkPhos  137<H>  06-23  PTT - ( 23 Jun 2020 01:39 )  PTT:65.8 sec        RADIOLOGY & ADDITIONAL STUDIES:  EXAM:  CT BRAIN                        PROCEDURE DATE:  06/22/2020        IMPRESSION:     Redemonstration volume loss, microvascular disease, no obvious large hemorrhage or midline shift, slightly slightly increased attenuation in right corona radiata with vasogenic edema, possibly metastatic disease, in patient with concern for metastasis, MRI with contrast may be more sensitive and obtained for improved assessment as clinically warranted.       EXAM:  CT ANGIO CHEST (W)AW IC                        PROCEDURE DATE:  06/21/2020      IMPRESSION:   No pulmonary embolism.    A large loculated right pleural effusion, increased from small on 06/03/2020, with complete atelectasis of the right lower lobe and partial atelectasis of the right upper and middle lobes. A new small left pleural effusion.    Recurrent invasive right breast cancer with extensive metastatic disease involving the right chest wall soft tissues and musculature. Also extensive metastases above and below the diaphragm including lymph node metastases and hepatic metastases.    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    Interval increase in partially imaged right upper extremity edema since prior.    Osseous metastatic disease with mild worsening of T2 and L2 pathologic compression deformities since 06/03/2020.          EXAM:  XR CHEST AP OR PA 1V                          PROCEDURE DATE:  06/23/2020        IMPRESSION: Redemonstrated loculated right pleural effusion, similar to prior.          PROTEIN CALORIE MALNUTRITION: [ ] mild [ ] moderate [ ] severe  [ ] underweight [ ] morbid obesity    https://www.andeal.org/vault/2440/web/files/ONC/Table_Clinical%20Characteristics%20to%20Document%20Malnutrition-White%20JV%20et%20al%461269.pdf    Height (cm): 167.6 (06-03-20 @ 20:28), 167.6 (09-17-19 @ 09:00)  Weight (kg): 73.3 (06-21-20 @ 10:53), 74.2 (06-03-20 @ 20:28), 71.9 (09-17-19 @ 09:00)  BMI (kg/m2): 26.1 (06-21-20 @ 10:53), 26.4 (06-03-20 @ 20:28), 25.6 (09-17-19 @ 09:00)    [ x] PPSV2 < or = 30% [ ] significant weight loss [ ] poor nutritional intake [ ] anasarca Albumin, Serum: 2.8 g/dL (06-23-20 @ 07:09)    Artificial Nutrition [ ]     REFERRALS:   [ ]Chaplaincy  [x ] Hospice  [ ]Child Life  [x ]Social Work  [ ]Case management [ ]Holistic Therapy [ ] Physical Therapy [ ] Dietary   Goals of Care Document: GAP TEAM PALLIATIVE CARE UNIT PROGRESS NOTE:      [  ] Patient on hospice program.    INDICATION FOR PALLIATIVE CARE UNIT SERVICES:  Symptom management of pain and  SOB in the setting of  Metastatic breast cancer C/B Fungating breast ulcer and  RUE DVT    INTERVAL HPI/OVERNIGHT EVENTS: Patient seen and examined at bedside, appears uncomfortable, labored breathing, moaning,  . Received morphine 1 mg IVp q 4 ATC and Morphine 1.5 mg to 2 mg IVP ( Total of 14.5mg / 24 hours)     Will transition to a morphine drip @ 0.5 mg /hr with Breakthrough of 2 mg ivp PRN      DNR on chart: Yes    Allergies    No Known Allergies    Intolerances    MEDICATIONS  (STANDING):  BACItracin   Ointment 1 Application(s) Topical two times a day  enoxaparin Injectable 70 milliGRAM(s) SubCutaneous two times a day  methylPREDNISolone sodium succinate Injectable 60 milliGRAM(s) IV Push every 6 hours  morphine  Infusion 0.5 mG/Hr (0.5 mL/Hr) IV Continuous <Continuous>  pantoprazole  Injectable 40 milliGRAM(s) IV Push daily  silver sulfADIAZINE 1% Cream 1 Application(s) Topical daily    MEDICATIONS  (PRN):  bisacodyl Suppository 10 milliGRAM(s) Rectal daily PRN Constipation  glycopyrrolate Injectable 0.4 milliGRAM(s) IV Push every 4 hours PRN secretions  LORazepam   Injectable 0.5 milliGRAM(s) IV Push every 4 hours PRN Anxiety, intractable dyspnea after back to back opioids are given.  morphine  - Injectable 2 milliGRAM(s) IV Push every 1 hour PRN Dyspnea  morphine  - Injectable 2 milliGRAM(s) IV Push every 1 hour PRN Pain    ITEMS UNCHECKED ARE NOT PRESENT    PRESENT SYMPTOMS: [ x]Unable to obtain due to poor mentation   Source if other than patient:  [ ]Family   [ ]Team     Pain: [x ] yes  ( see pain AD score)   [ ] no  QOL impact -   Location -                    Aggravating factors -  Quality -  Radiation -  Timing-  Severity (0-10 scale):  Minimal acceptable level (0-10 scale):     Dyspnea:                           [ ]Mild [ ]Moderate [ ]Severe  Anxiety:                             [ ]Mild [ ]Moderate [ ]Severe  Fatigue:                             [ ]Mild [ ]Moderate [ ]Severe  Nausea:                             [ ]Mild [ ]Moderate [ ]Severe  Loss of appetite:              [ ]Mild [ ]Moderate [ ]Severe  Constipation:                    [ ]Mild [ ]Moderate [ ]Severe    PAINAD Score: ranges from 0-6 in the past 24 hours ( labored breathing, frightened, sad, moaning, unable to console)     [email or web address] (Ctrl +  left click to view)  		  Other Symptoms:  [ ]All other review of systems negative     Palliative Performance Status Version 2:       10  %         [email or web address]/[email or web address]  PHYSICAL EXAM:  Vital Signs Last 24 Hrs  T(C): 36.6 (24 Jun 2020 08:47), Max: 36.6 (24 Jun 2020 08:47)  T(F): 97.9 (24 Jun 2020 08:47), Max: 97.9 (24 Jun 2020 08:47)  HR: 121 (24 Jun 2020 08:47) (121 - 121)  BP: 147/88 (24 Jun 2020 08:47) (147/88 - 147/88)  BP(mean): --  RR: 24 (24 Jun 2020 08:47) (24 - 24)  SpO2: 97% (24 Jun 2020 08:47) (97% - 97%) I&O's Summary    23 Jun 2020 07:01  -  24 Jun 2020 07:00  --------------------------------------------------------  IN: 0 mL / OUT: 700 mL / NET: -700 mL    GENERAL:  [ ]Alert  [ ]Oriented x   [x ]Lethargic  [ ]Cachexia  [ ]Unarousable  [ ]Verbal  [x ]Non-Verbal  Behavioral:   [x ] Anxiety  [ ] Delirium [ ] Agitation [ ] Other  HEENT:  [ ]Normal   [ x]Dry mouth   [ ]ET Tube/Trach  [ ]Oral lesions  PULMONARY:   [x ]Clear [ ]Tachypnea  [ ]Audible excessive secretions   [ ]Rhonchi        [ ]Right [ ]Left [ ]Bilateral  [x ]Crackles        [ ]Right [ ]Left [ ]Bilateral  [ ]Wheezing     [ ]Right [ ]Left [ ]Bilateral  [x ]Diminshed BS [ ]Right [ ]Left [ ]Bilateral    CARDIOVASCULAR:    [x ]Regular [ ]Irregular [ ]Tachy  [ ]Abner [ ]Murmur [ ]Other  GASTROINTESTINAL:  [x ]Soft  [ ]Distended   [x ]+BS  [ x]Non tender [ ]Tender  [ ]PEG [ ]OGT/ NGT   Last BM: None ( dulcolax)        GENITOURINARY:  [ ]Normal [x ] Incontinent   [ ]Oliguria/Anuria   [ ]Birch  MUSCULOSKELETAL:   [ ]Normal   [x ]Weakness  [x ]Bed/Wheelchair bound [ ]Edema  NEUROLOGIC:   [ ]No focal deficits  [x ] Cognitive impairment  [ ] Dysphagia [ ]Dysarthria [ ] Paresis [ ]Other   SKIN:   [x] Fungating mass over right breast with areas of erythema, blisters and oozing discharge.  [x] RUE swelling  [ ]Pressure ulcer(s)  [ ]y [ ]n  Present on admission      CRITICAL CARE:  [ ] Shock Present  [ ]Septic [ ]Cardiogenic [ ]Neurologic [ ]Hypovolemic  [ ]  Vasopressors [ ]  Inotropes   [x ] Respiratory failure present [ ] Mechanical Ventilation [ ] Non-invasive ventilatory support [ ] High-Flow  [ x] Acute  [ x] Chronic [ ] Hypoxic  [ ] Hypercarbic [ ] Other  [ x] Other organ failure   skin: fungating Breast mass      LABS: None new.                  RADIOLOGY & ADDITIONAL STUDIES: None new.               PROTEIN CALORIE MALNUTRITION: [ ] mild [ ] moderate [ ] severe  [ ] underweight [ ] morbid obesity    https://www.andeal.org/vault/2440/web/files/ONC/Table_Clinical%20Characteristics%20to%20Document%20Malnutrition-White%20JV%20et%20al%202012.pdf    Height (cm): 167.6 (06-03-20 @ 20:28), 167.6 (09-17-19 @ 09:00)  Weight (kg): 73.3 (06-21-20 @ 10:53), 74.2 (06-03-20 @ 20:28), 71.9 (09-17-19 @ 09:00)  BMI (kg/m2): 26.1 (06-21-20 @ 10:53), 26.4 (06-03-20 @ 20:28), 25.6 (09-17-19 @ 09:00)    [ x] PPSV2 < or = 30% [ ] significant weight loss [ ] poor nutritional intake [ ] anasarca Albumin, Serum: 2.8 g/dL (06-23-20 @ 07:09)    Artificial Nutrition [ ]     REFERRALS:   [ ]Chaplaincy  [x ] Hospice  [ ]Child Life  [x ]Social Work  [ ]Case management [ ]Holistic Therapy [ ] Physical Therapy [ ] Dietary   Goals of Care Document:

## 2020-06-24 NOTE — PROGRESS NOTE ADULT - PROBLEM SELECTOR PLAN 4
start Morphine drip @ 0.5 mg/hr   C/W nasal canula oxygen 3 L  Due to ? Lymphangitic spread, will start Methylprednisolone 60m g IV q 6   Pulm is following for possible thoracentesis Vs pleurX Extreme swelling in the RUE  RUE DVT  on Lovenox 70 mg BID

## 2020-06-24 NOTE — PROGRESS NOTE ADULT - PROBLEM SELECTOR PLAN 1
Likely 2/2 metastatic breast ca with R. Breast fungating ulcer  Received morphine 1 mg IVp q 4 ATC and Morphine 1.5 mg to 2 mg IVP ( Total of 14.5mg / 24 hours)   Will start a morphine drip @ 0.5 mg /hr with Breakthrough of 2 mg ivp PRN 2/2 metastatic breast ca with R. Breast fungating ulcer  Received morphine 1 mg IVp q 4 ATC and Morphine 1.5 mg to 2 mg IVP ( Total of 14.5mg / 24 hours)   Will start a morphine drip @ 0.5 mg /hr with Breakthrough of 2 mg ivp PRN

## 2020-06-24 NOTE — DIETITIAN INITIAL EVALUATION ADULT. - PROBLEM SELECTOR PLAN 2
-CT Head WC 6/21:  small hyperdense foci withing the R corona radiata and L temporal lobes with mild edema. T2/ L2 pathologic compression fx.  - Neurosurgery following:  no acute neurosurgical intervention  - Start prednisone 50mg QD  -TLSO brace for comfort  - MRI brain wwo ordered  - Palliativec consult placed.

## 2020-06-24 NOTE — GOALS OF CARE CONVERSATION - ADVANCED CARE PLANNING - CONVERSATION DETAILS
[facility] by [facility] physician.  Called and left message void of PHI for pt's "sister"/HCP Anh Hui , requesting call back.  Awaiting return call.

## 2020-06-24 NOTE — DIETITIAN INITIAL EVALUATION ADULT. - PROBLEM SELECTOR PLAN 5
-diagnosed in 2018, s/p chemo and radiation treatment now w/ now metastatic spread to spine, liver, brain, lung and extensive skin changes on R chest wall.   - Palliative consulted for GOC discussion  -  c/w home oxy PRN, gabapentin, and magic mouthwash for pain  - Wound care: Bacitracin/silver sulfazidine  - DNR status

## 2020-06-24 NOTE — PROGRESS NOTE ADULT - PROBLEM SELECTOR PLAN 3
Large loculated R. pleural effusion and small left pleural effusion.  Pulmonary following for Thoracocentesis vs Pleurx ( but patient may not be able to tolerate as rapidly declining )

## 2020-06-24 NOTE — DIETITIAN INITIAL EVALUATION ADULT. - PROBLEM SELECTOR PLAN 3
- large loculated R pleural effusion, and extensive metastatic disease (hepatic, spine)  - Pulm consulted for therapeutic and diagnostic thoracentesis

## 2020-06-24 NOTE — PROGRESS NOTE ADULT - PROBLEM SELECTOR PLAN 2
likely 2/2 to metastatic disease and malignant pleural effusion  C/W morphine ATC   C/W nasal canula oxygen 3L  Pulmonary following for thoracentesis Vs pleurx ( but patient may not be able to tolerate as rapidly declining )

## 2020-06-24 NOTE — PROGRESS NOTE ADULT - PROBLEM SELECTOR PLAN 6
CT chest with metastatic breast ca with R. Breast fungating ulcer, osseous  mets large right Pleural effusion, small left pleural effusion.  No DMT Updated sister Anh Villalta(HCP)  [phone], that patient has been started on a Morphine drip to keep her comfortable as had required multiple doses of PRN's in the past 24 hours and still not comfortable. She agreed to inpatient hospice referral. also explained to sister that patient has entered the dying process. she expresses understanding and emotional support provided.  Hospice referral made.

## 2020-06-25 NOTE — DISCHARGE NOTE FOR THE EXPIRED PATIENT - SECONDARY DIAGNOSIS.
Brain metastasis Deep vein thrombosis (DVT) Acute respiratory failure with hypoxia Dyspnea Lymphedema Metastatic cancer Pleural effusion Wound of breast with complication, right, initial encounter

## 2020-06-25 NOTE — PROGRESS NOTE ADULT - PROBLEM SELECTOR PLAN 6
Patient has been accepted to inpatient hospice at [facility] and at [facility], but patient at this time is actively dying.   Called Anh Villalta([facility])  [phone], to let her know of the changes. Anh now presently at bedside with patient.  Emotional support provided.

## 2020-06-25 NOTE — PROGRESS NOTE ADULT - SUBJECTIVE AND OBJECTIVE BOX
GAP TEAM PALLIATIVE CARE UNIT PROGRESS NOTE:      [  ] Patient on hospice program.    INDICATION FOR PALLIATIVE CARE UNIT SERVICES:  Symptom management of pain and  SOB in the setting of  Metastatic breast cancer C/B Fungating breast ulcer and  RUE DVT    INTERVAL HPI/OVERNIGHT EVENTS: Patient seen and examined at bedside, Actively dying, On morphine gtt @ 0.5 mg/hr with breakthrough of morphine 2mg IVP ( received x 2 overnight) . Received  Ativan 0.5 mg IVP q4 hr x3    Will make Ativan 0.5 mg IVP q 1 hr PRN          DNR on chart: Yes    Allergies    No Known Allergies    Intolerances    MEDICATIONS  (STANDING):  BACItracin   Ointment 1 Application(s) Topical two times a day  enoxaparin Injectable 70 milliGRAM(s) SubCutaneous two times a day  methylPREDNISolone sodium succinate Injectable 60 milliGRAM(s) IV Push every 6 hours  morphine  Infusion 0.5 mG/Hr (0.5 mL/Hr) IV Continuous <Continuous>  pantoprazole  Injectable 40 milliGRAM(s) IV Push daily  silver sulfADIAZINE 1% Cream 1 Application(s) Topical daily    MEDICATIONS  (PRN):  bisacodyl Suppository 10 milliGRAM(s) Rectal daily PRN Constipation  glycopyrrolate Injectable 0.4 milliGRAM(s) IV Push every 4 hours PRN secretions  LORazepam   Injectable 0.5 milliGRAM(s) IV Push every 1 hour PRN Agitation  morphine  - Injectable 2 milliGRAM(s) IV Push every 1 hour PRN Dyspnea  morphine  - Injectable 2 milliGRAM(s) IV Push every 1 hour PRN Pain    ITEMS UNCHECKED ARE NOT PRESENT    PRESENT SYMPTOMS: [x ]Unable to obtain due to poor mentation ( Actively Dying)   Source if other than patient:  [ ]Family   [ ]Team     Pain: [x ] yes [ ] no  ( see pain ad score)  QOL impact -   Location -                    Aggravating factors -  Quality -  Radiation -  Timing-  Severity (0-10 scale):  Minimal acceptable level (0-10 scale):     Dyspnea:                           [ ]Mild [ ]Moderate [ ]Severe  Anxiety:                             [ ]Mild [ ]Moderate [ ]Severe  Fatigue:                             [ ]Mild [ ]Moderate [ ]Severe  Nausea:                             [ ]Mild [ ]Moderate [ ]Severe  Loss of appetite:              [ ]Mild [ ]Moderate [ ]Severe  Constipation:                    [ ]Mild [ ]Moderate [ ]Severe    PAINAD Score: 1-6 (in the past 24-48 hours) facial  grimacing, unable to console, tense, labored breathing.     [email or web address] (Ctrl +  left click to view)  		  Other Symptoms:  [ ]All other review of systems negative     Palliative Performance Status Version 2:        10 %         [email or web address]  PHYSICAL EXAM:  Vital Signs Last 24 Hrs  T(C): 36.7 (25 Jun 2020 08:35), Max: 36.7 (25 Jun 2020 08:35)  T(F): 98.1 (25 Jun 2020 08:35), Max: 98.1 (25 Jun 2020 08:35)  HR: 113 (25 Jun 2020 08:35) (113 - 113)  BP: 84/56 (25 Jun 2020 08:35) (84/56 - 84/56)  BP(mean): --  RR: 24 (25 Jun 2020 08:35) (24 - 24)  SpO2: 91% (25 Jun 2020 08:35) (91% - 91%) I&O's Summary  GENERAL:  [ ]Alert  [ ]Oriented x   [ x]Lethargic  [ ]Cachexia  [ x]Unarousable  [ ]Verbal  [x ]Non-Verbal  Behavioral:   [x ] Anxiety  [ ] Delirium [ ] Agitation [ ] Other  HEENT:  [ ]Normal   [x ]Dry mouth   [ ]ET Tube/Trach  [ ]Oral lesions  PULMONARY:   [ ]Clear [ ]Tachypnea  [ ]Audible excessive secretions   [ ]Rhonchi        [ ]Right [ ]Left [ ]Bilateral  [x ]Crackles        [ ]Right [ ]Left [ ]Bilateral  [ ]Wheezing     [ ]Right [ ]Left [ ]Bilateral  [x ]Diminshed BS [ ]Right [ ]Left [ x]Bilateral    CARDIOVASCULAR:    [ x]Regular [ ]Irregular [ ]Tachy  [ ]Abner [ ]Murmur [ ]Other  GASTROINTESTINAL:  [ x]Soft  [ ]Distended   [ x]+BS  [ ]Non tender [ ]Tender  [ ]PEG [ ]OGT/ NGT   Last BM:  None ( Suppository given)     GENITOURINARY:  [ ]Normal [x ] Incontinent   [ ]Oliguria/Anuria   [ ]Birch  MUSCULOSKELETAL:   [ ]Normal   [ ]Weakness  [x ]Bed/Wheelchair bound [ ]Edema  NEUROLOGIC:   [ ]No focal deficits  [ x] Cognitive impairment  [ ] Dysphagia [ ]Dysarthria [ ] Paresis [ ]Other   SKIN:   [ ]Normal  [ ]Rash     [x] Fungating mass over right breast with areas of erythema, blisters and oozing discharge.  [x] RUE swelling   [x ]y  Present on admission      CRITICAL CARE:  [ ] Shock Present  [ ]Septic [ ]Cardiogenic [ ]Neurologic [ ]Hypovolemic  [ ]  Vasopressors [ ]  Inotropes   [x ] Respiratory failure present [ ] Mechanical Ventilation [ ] Non-invasive ventilatory support [ ] High-Flow  [ x] Acute  [ x] Chronic [ ] Hypoxic  [ ] Hypercarbic [ ] Other  [x ] Other organ failure: skin: fungating Breast mass      LABS: None New      RADIOLOGY & ADDITIONAL STUDIES: None New      PROTEIN CALORIE MALNUTRITION: [ ] mild [ ] moderate [ ] severe  [ ] underweight [ ] morbid obesity    https://www.andeal.org/vault/2440/web/files/ONC/Table_Clinical%20Characteristics%20to%20Document%20Malnutrition-White%20JV%20et%20al%301552.pdf    Height (cm): 167.6 (06-03-20 @ 20:28), 167.6 (09-17-19 @ 09:00)  Weight (kg): 73.3 (06-21-20 @ 10:53), 74.2 (06-03-20 @ 20:28), 71.9 (09-17-19 @ 09:00)  BMI (kg/m2): 26.1 (06-21-20 @ 10:53), 26.4 (06-03-20 @ 20:28), 25.6 (09-17-19 @ 09:00)    [ x] PPSV2 < or = 30% [ ] significant weight loss [ ] poor nutritional intake [ ] anasarca Albumin, Serum: 2.8 g/dL (06-23-20 @ 07:09)    Artificial Nutrition [ ]     REFERRALS:   [ ]Chaplaincy  [x ] Hospice  [ ]Child Life  [x ]Social Work  [ ]Case management [ ]Holistic Therapy [ ] Physical Therapy [ ] Dietary   Goals of Care Document: ARNALDO Aponte (06-24-20 @ 18:14)  Goals of Care Conversation:   Conversation Discussion:  · Conversation  Hospice Referral  · Conversation Details  [facility].  Called and left message void of PHI for pt's "sister"/HCP Anh Hui , requesting call back.  Awaiting return call.      Electronic Signatures:  Chloe Aponte (RN)  (Signed 24-Jun-2020 18:16)  	Authored: Goals of Care Conversation      Last Updated: 24-Jun-2020 18:16 by Chloe Aponte (RN) GAP TEAM PALLIATIVE CARE UNIT PROGRESS NOTE:      [  ] Patient on hospice program.    INDICATION FOR PALLIATIVE CARE UNIT SERVICES:  Symptom management of pain and  SOB in the setting of  Metastatic breast cancer C/B Fungating breast ulcer and  RUE DVT    INTERVAL HPI/OVERNIGHT EVENTS: Patient seen and examined at bedside, Actively dying, On morphine gtt @ 0.5 mg/hr with breakthrough of morphine 2mg IVP ( received x 2 overnight) . Received  Ativan 0.5 mg IVP q4 hr x3    Will make Ativan 0.5 mg IVP q 1 hr PRN          DNR on chart: Yes    Allergies    No Known Allergies    Intolerances    MEDICATIONS  (STANDING):  BACItracin   Ointment 1 Application(s) Topical two times a day  enoxaparin Injectable 70 milliGRAM(s) SubCutaneous two times a day  methylPREDNISolone sodium succinate Injectable 60 milliGRAM(s) IV Push every 6 hours  morphine  Infusion 0.5 mG/Hr (0.5 mL/Hr) IV Continuous <Continuous>  pantoprazole  Injectable 40 milliGRAM(s) IV Push daily  silver sulfADIAZINE 1% Cream 1 Application(s) Topical daily    MEDICATIONS  (PRN):  bisacodyl Suppository 10 milliGRAM(s) Rectal daily PRN Constipation  glycopyrrolate Injectable 0.4 milliGRAM(s) IV Push every 4 hours PRN secretions  LORazepam   Injectable 0.5 milliGRAM(s) IV Push every 1 hour PRN Agitation  morphine  - Injectable 2 milliGRAM(s) IV Push every 1 hour PRN Dyspnea  morphine  - Injectable 2 milliGRAM(s) IV Push every 1 hour PRN Pain    ITEMS UNCHECKED ARE NOT PRESENT    PRESENT SYMPTOMS: [x ]Unable to obtain due to poor mentation ( Actively Dying)   Source if other than patient:  [ ]Family   [ ]Team     Pain: [x ] yes [ ] no  ( see pain ad score)  QOL impact -   Location -                    Aggravating factors -  Quality -  Radiation -  Timing-  Severity (0-10 scale):  Minimal acceptable level (0-10 scale):     Dyspnea:                           [ ]Mild [ ]Moderate [ ]Severe  Anxiety:                             [ ]Mild [ ]Moderate [ ]Severe  Fatigue:                             [ ]Mild [ ]Moderate [ ]Severe  Nausea:                             [ ]Mild [ ]Moderate [ ]Severe  Loss of appetite:              [ ]Mild [ ]Moderate [ ]Severe  Constipation:                    [ ]Mild [ ]Moderate [ ]Severe    PAINAD Score: 1-6 (in the past 24-48 hours) facial  grimacing, unable to console, tense, labored breathing.     [email or web address] (Ctrl +  left click to view)  		  Other Symptoms:  [ ]All other review of systems negative     Palliative Performance Status Version 2:        10 %         [email or web address]  PHYSICAL EXAM:  Vital Signs Last 24 Hrs  T(C): 36.7 (25 Jun 2020 08:35), Max: 36.7 (25 Jun 2020 08:35)  T(F): 98.1 (25 Jun 2020 08:35), Max: 98.1 (25 Jun 2020 08:35)  HR: 113 (25 Jun 2020 08:35) (113 - 113)  BP: 84/56 (25 Jun 2020 08:35) (84/56 - 84/56)  BP(mean): --  RR: 24 (25 Jun 2020 08:35) (24 - 24)  SpO2: 91% (25 Jun 2020 08:35) (91% - 91%) I&O's Summary  GENERAL:  [ ]Alert  [ ]Oriented x   [ x]Lethargic  [ ]Cachexia  [ x]Unarousable  [ ]Verbal  [x ]Non-Verbal  Behavioral:   [x ] Anxiety  [ ] Delirium [ ] Agitation [ ] Other  HEENT:  [ ]Normal   [x ]Dry mouth   [ ]ET Tube/Trach  [ ]Oral lesions  PULMONARY:   [ ]Clear [ ]Tachypnea  [ ]Audible excessive secretions   [ ]Rhonchi        [ ]Right [ ]Left [ ]Bilateral  [x ]Crackles        [ ]Right [ ]Left [ ]Bilateral  [ ]Wheezing     [ ]Right [ ]Left [ ]Bilateral  [x ]Diminshed BS [ ]Right [ ]Left [ x]Bilateral    CARDIOVASCULAR:    [ ]Regular [ ]Irregular [x ]Tachy  [ ]Abner [ ]Murmur [ ]Other  GASTROINTESTINAL:  [ x]Soft  [ ]Distended   [ x]+BS  [ ]Non tender [ ]Tender  [ ]PEG [ ]OGT/ NGT   Last BM:  None ( Suppository given)     GENITOURINARY:  [ ]Normal [x ] Incontinent   [ ]Oliguria/Anuria   [ ]Birch  MUSCULOSKELETAL:   [ ]Normal   [ ]Weakness  [x ]Bed/Wheelchair bound [ ]Edema  NEUROLOGIC:   [ ]No focal deficits  [ x] Cognitive impairment  [ ] Dysphagia [ ]Dysarthria [ ] Paresis [ ]Other   SKIN:   [ ]Normal  [ ]Rash     [x] Fungating mass over right breast with areas of erythema, blisters and oozing discharge.  [x] RUE swelling   [x ]y  Present on admission      CRITICAL CARE:  [ ] Shock Present  [ ]Septic [ ]Cardiogenic [ ]Neurologic [ ]Hypovolemic  [ ]  Vasopressors [ ]  Inotropes   [x ] Respiratory failure present [ ] Mechanical Ventilation [ ] Non-invasive ventilatory support [ ] High-Flow  [ x] Acute  [ x] Chronic [ ] Hypoxic  [ ] Hypercarbic [ ] Other  [x ] Other organ failure: skin: fungating Breast mass      LABS: None New      RADIOLOGY & ADDITIONAL STUDIES: None New      PROTEIN CALORIE MALNUTRITION: [ ] mild [ ] moderate [ ] severe  [ ] underweight [ ] morbid obesity    https://www.andeal.org/vault/2440/web/files/ONC/Table_Clinical%20Characteristics%20to%20Document%20Malnutrition-White%20JV%20et%20al%770715.pdf    Height (cm): 167.6 (06-03-20 @ 20:28), 167.6 (09-17-19 @ 09:00)  Weight (kg): 73.3 (06-21-20 @ 10:53), 74.2 (06-03-20 @ 20:28), 71.9 (09-17-19 @ 09:00)  BMI (kg/m2): 26.1 (06-21-20 @ 10:53), 26.4 (06-03-20 @ 20:28), 25.6 (09-17-19 @ 09:00)    [ x] PPSV2 < or = 30% [ ] significant weight loss [ ] poor nutritional intake [ ] anasarca Albumin, Serum: 2.8 g/dL (06-23-20 @ 07:09)    Artificial Nutrition [ ]     REFERRALS:   [ ]Chaplaincy  [x ] Hospice  [ ]Child Life  [x ]Social Work  [ ]Case management [ ]Holistic Therapy [ ] Physical Therapy [ ] Dietary   Goals of Care Document: ARNALDO Aponte (06-24-20 @ 18:14)  Goals of Care Conversation:   Conversation Discussion:  · Conversation  Hospice Referral  · Conversation Details  [facility]  Called and left message void of PHI for pt's "sister"/HCP Anh Hui , requesting call back.  Awaiting return call.      Electronic Signatures:  Chloe Aponte (RN)  (Signed 24-Jun-2020 18:16)  	Authored: Goals of Care Conversation      Last Updated: 24-Jun-2020 18:16 by Chloe Aponte (RN)

## 2020-06-25 NOTE — PROGRESS NOTE ADULT - PROBLEM SELECTOR PLAN 3
Large loculated R. pleural effusion and small left pleural effusion.  Pulmonary consult appreciated Turn and position, robinul 0.4mg IVP every 6 hours.

## 2020-06-25 NOTE — PROGRESS NOTE ADULT - ASSESSMENT
69 year old female with metastatic  breast cancer (diagnosed in 2018. Mets to bone with malignant pleural effusion. Also with fungating wound and extensive metastases above and below the diaphragm including lymph node metastases and hepatic metastases. 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 Furthermore with ? Brain mets) s/p chemotherapy and radiation treatment ( last session about 6 months prior to admission),  anemia, MVP, presents for shortness of breath. During her recent admission she was undecided and in fact hesitant about getting DMT and did not want to address codes status. However, she assigned her "... very close family friend (often referred to as her "sister") Anh Meridalary, [phone], as sole proxy, and did not identify any alternate." She is now DNR/I based on the HCP inputs.  Patient is in PCU for end of life care - management of pain and dyspnea.

## 2020-06-25 NOTE — PROGRESS NOTE ADULT - ATTENDING COMMENTS
I have personally seen and examined this patient and agree with the above assessment and plan, which I have reviewed and edited where appropriate.   Patient near agonal and in the final stage of dying.  Family aware and  educated as to what to expect.  Questions answered.  Emotional support provided.
Seen at bedside, no off BIPAP, is slightly lethargic. CTH performed this am given re-initiation of full dose ac, CTH stable, nop bleed, just redemonstration of mass with vasogenic edema. Pulm consulted. Plan for pleural drainage today. Palliative also consulted for continued GOC conversations given stage IV disease with readmission
I have personally seen and examined this patient and agree with the above assessment and plan, which I have reviewed and edited where appropriate.   Patient has deteriorated since yesterday, ongoing management of symptoms in the setting of metastatic breast cancer.  Family aware of situation.
I have personally seen and examined this patient and agree with the above assessment and plan, which I have reviewed and edited where appropriate.   Metastatic breast cancer with pain from breast mass/radiation damage, dyspnea with pleural effusion.  Plan as above.  Family  educated as to what to expect.  Questions answered.  Emotional support provided.   Hospice evaluation.

## 2020-06-25 NOTE — PROGRESS NOTE ADULT - PROBLEM SELECTOR PLAN 2
likely 2/2 to metastatic disease and malignant pleural effusion  C/W morphine drip and Breakthroughs   C/W nasal canula oxygen 3L for comfort

## 2020-06-25 NOTE — PROGRESS NOTE ADULT - PROBLEM SELECTOR PLAN 5
CT chest with metastatic breast ca with R. Breast fungating ulcer, osseous  mets large right Pleural effusion, small left pleural effusion.  No DMT

## 2020-06-25 NOTE — DISCHARGE NOTE FOR THE EXPIRED PATIENT - HOSPITAL COURSE
69 year old female with metastatic  breast cancer (diagnosed in 2018. Mets to bone with malignant pleural effusion. Also with fungating wound and extensive metastases above and below the diaphragm including lymph node metastases and hepatic metastases. 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 Furthermore with ? Brain mets) s/p chemotherapy and radiation treatment ( last session about 6 months prior to admission),  anemia, MVP, presents for shortness of breath. During her recent admission she was undecided and in fact hesitant about getting DMT and did not want to address codes status. However, she assigned her "... very close family friend (often referred to as her "sister") Anh Hui, [phone], as sole proxy, and did not identify any alternate." She is now DNR/I based on the HCP inputs.  Patient is in PCU for end of life care - management of pain and dyspnea.      During the stay in the PCU patient was treated with Morphine img IVP q 4 ATC and 2 mg IVP q 1 PRN, Ativan 0.5 mg IVP PRN, Rubinol 0.4 mg IVP q 6 PRN, Methylprednisolone 60 mg IVP q 6, Dulcolax suppository. As patient became increasingly symptomatic actually entering the active dying phase she was later on transitioned to a morphine drip @0.5 mg/hr.  patient  on  at 1.53 pm, Sister Anh at bedside. Emotional support provided. Autopsy declined.

## 2020-06-25 NOTE — PROGRESS NOTE ADULT - PROBLEM SELECTOR PLAN 1
Patient actively dying  2/2 metastatic breast ca with R. Breast fungating ulcer  C/W morphine drip @ 0.5 mg /hr with Breakthrough of 2 mg ivp PRN, received breakthrough x2

## 2020-06-26 LAB
CULTURE RESULTS: SIGNIFICANT CHANGE UP
CULTURE RESULTS: SIGNIFICANT CHANGE UP
SPECIMEN SOURCE: SIGNIFICANT CHANGE UP
SPECIMEN SOURCE: SIGNIFICANT CHANGE UP

## 2021-07-22 NOTE — ED ADULT NURSE NOTE - EXTENSIONS OF SELF_ADULT
None Nsaids Counseling: NSAID Counseling: I discussed with the patient that NSAIDs should be taken with food. Prolonged use of NSAIDs can result in the development of stomach ulcers.  Patient advised to stop taking NSAIDs if abdominal pain occurs.  The patient verbalized understanding of the proper use and possible adverse effects of NSAIDs.  All of the patient's questions and concerns were addressed.

## 2021-07-26 NOTE — PHYSICAL THERAPY INITIAL EVALUATION ADULT - DISCHARGE DISPOSITION, PT EVAL
Patient Education   Personalized Prevention Plan  You are due for the preventive services outlined below.  Your care team is available to assist you in scheduling these services.  If you have already completed any of these items, please share that information with your care team to update in your medical record.  Health Maintenance Due   Topic Date Due     URINE DRUG SCREEN  Never done     Zoster (Shingles) Vaccine (2 of 3) 02/17/2009     Heart Failure Action Plan  07/18/2019     Basic Metabolic Panel  12/01/2020     PHQ-2  01/01/2021     ANNUAL REVIEW OF HM ORDERS  02/25/2021     Liver Monitoring Lab  06/01/2021     Cholesterol Lab  06/01/2021     Complete Blood Count  06/15/2021     FALL RISK ASSESSMENT  07/24/2021     Annual Wellness Visit  07/24/2021        Patient Education   Personalized Prevention Plan  You are due for the preventive services outlined below.  Your care team is available to assist you in scheduling these services.  If you have already completed any of these items, please share that information with your care team to update in your medical record.  Health Maintenance Due   Topic Date Due     URINE DRUG SCREEN  Never done     Zoster (Shingles) Vaccine (2 of 3) 02/17/2009     Heart Failure Action Plan  07/18/2019     Basic Metabolic Panel  12/01/2020     PHQ-2  01/01/2021     ANNUAL REVIEW OF HM ORDERS  02/25/2021     Liver Monitoring Lab  06/01/2021     Cholesterol Lab  06/01/2021     Complete Blood Count  06/15/2021     FALL RISK ASSESSMENT  07/24/2021     Annual Wellness Visit  07/24/2021         no skilled PT needs/home

## 2022-01-23 NOTE — PROGRESS NOTE ADULT - REASON FOR ADMISSION
SOB and RUE swelling x one month
3-5x/week

## 2022-03-07 NOTE — DIETITIAN INITIAL EVALUATION ADULT. - PROBLEM/PLAN-1
Sarecycline Pregnancy And Lactation Text: This medication is Pregnancy Category D and not consider safe during pregnancy. It is also excreted in breast milk. Tazorac Pregnancy And Lactation Text: This medication is not safe during pregnancy. It is unknown if this medication is excreted in breast milk. Doxycycline Pregnancy And Lactation Text: This medication is Pregnancy Category D and not consider safe during pregnancy. It is also excreted in breast milk but is considered safe for shorter treatment courses. DISPLAY PLAN FREE TEXT Erythromycin Counseling:  I discussed with the patient the risks of erythromycin including but not limited to GI upset, allergic reaction, drug rash, diarrhea, increase in liver enzymes, and yeast infections. Birth Control Pills Counseling: Birth Control Pill Counseling: I discussed with the patient the potential side effects of OCPs including but not limited to increased risk of stroke, heart attack, thrombophlebitis, deep venous thrombosis, hepatic adenomas, breast changes, GI upset, headaches, and depression.  The patient verbalized understanding of the proper use and possible adverse effects of OCPs. All of the patient's questions and concerns were addressed. Bactrim Counseling:  I discussed with the patient the risks of sulfa antibiotics including but not limited to GI upset, allergic reaction, drug rash, diarrhea, dizziness, photosensitivity, and yeast infections.  Rarely, more serious reactions can occur including but not limited to aplastic anemia, agranulocytosis, methemoglobinemia, blood dyscrasias, liver or kidney failure, lung infiltrates or desquamative/blistering drug rashes. Isotretinoin Counseling: Patient should get monthly blood tests, not donate blood, not drive at night if vision affected, not share medication, and not undergo elective surgery for 6 months after tx completed. Side effects reviewed, pt to contact office should one occur. Detail Level: Detailed Topical Sulfur Applications Pregnancy And Lactation Text: This medication is Pregnancy Category C and has an unknown safety profile during pregnancy. It is unknown if this topical medication is excreted in breast milk. Sarecycline Counseling: Patient advised regarding possible photosensitivity and discoloration of the teeth, skin, lips, tongue and gums.  Patient instructed to avoid sunlight, if possible.  When exposed to sunlight, patients should wear protective clothing, sunglasses, and sunscreen.  The patient was instructed to call the office immediately if the following severe adverse effects occur:  hearing changes, easy bruising/bleeding, severe headache, or vision changes.  The patient verbalized understanding of the proper use and possible adverse effects of sarecycline.  All of the patient's questions and concerns were addressed. Spironolactone Pregnancy And Lactation Text: This medication can cause feminization of the male fetus and should be avoided during pregnancy. The active metabolite is also found in breast milk. Doxycycline Counseling:  Patient counseled regarding possible photosensitivity and increased risk for sunburn.  Patient instructed to avoid sunlight, if possible.  When exposed to sunlight, patients should wear protective clothing, sunglasses, and sunscreen.  The patient was instructed to call the office immediately if the following severe adverse effects occur:  hearing changes, easy bruising/bleeding, severe headache, or vision changes.  The patient verbalized understanding of the proper use and possible adverse effects of doxycycline.  All of the patient's questions and concerns were addressed. Topical Clindamycin Pregnancy And Lactation Text: This medication is Pregnancy Category B and is considered safe during pregnancy. It is unknown if it is excreted in breast milk. Isotretinoin Pregnancy And Lactation Text: This medication is Pregnancy Category X and is considered extremely dangerous during pregnancy. It is unknown if it is excreted in breast milk. Birth Control Pills Pregnancy And Lactation Text: This medication should be avoided if pregnant and for the first 30 days post-partum. Topical Clindamycin Counseling: Patient counseled that this medication may cause skin irritation or allergic reactions.  In the event of skin irritation, the patient was advised to reduce the amount of the drug applied or use it less frequently.   The patient verbalized understanding of the proper use and possible adverse effects of clindamycin.  All of the patient's questions and concerns were addressed. Topical Retinoid Pregnancy And Lactation Text: This medication is Pregnancy Category C. It is unknown if this medication is excreted in breast milk. Topical Sulfur Applications Counseling: Topical Sulfur Counseling: Patient counseled that this medication may cause skin irritation or allergic reactions.  In the event of skin irritation, the patient was advised to reduce the amount of the drug applied or use it less frequently.   The patient verbalized understanding of the proper use and possible adverse effects of topical sulfur application.  All of the patient's questions and concerns were addressed. Dapsone Pregnancy And Lactation Text: This medication is Pregnancy Category C and is not considered safe during pregnancy or breast feeding. Minocycline Counseling: Patient advised regarding possible photosensitivity and discoloration of the teeth, skin, lips, tongue and gums.  Patient instructed to avoid sunlight, if possible.  When exposed to sunlight, patients should wear protective clothing, sunglasses, and sunscreen.  The patient was instructed to call the office immediately if the following severe adverse effects occur:  hearing changes, easy bruising/bleeding, severe headache, or vision changes.  The patient verbalized understanding of the proper use and possible adverse effects of minocycline.  All of the patient's questions and concerns were addressed. High Dose Vitamin A Pregnancy And Lactation Text: High dose vitamin A therapy is contraindicated during pregnancy and breast feeding. Benzoyl Peroxide Pregnancy And Lactation Text: This medication is Pregnancy Category C. It is unknown if benzoyl peroxide is excreted in breast milk. Tetracycline Counseling: Patient counseled regarding possible photosensitivity and increased risk for sunburn.  Patient instructed to avoid sunlight, if possible.  When exposed to sunlight, patients should wear protective clothing, sunglasses, and sunscreen.  The patient was instructed to call the office immediately if the following severe adverse effects occur:  hearing changes, easy bruising/bleeding, severe headache, or vision changes.  The patient verbalized understanding of the proper use and possible adverse effects of tetracycline.  All of the patient's questions and concerns were addressed. Patient understands to avoid pregnancy while on therapy due to potential birth defects. Benzoyl Peroxide Counseling: Patient counseled that medicine may cause skin irritation and bleach clothing.  In the event of skin irritation, the patient was advised to reduce the amount of the drug applied or use it less frequently.   The patient verbalized understanding of the proper use and possible adverse effects of benzoyl peroxide.  All of the patient's questions and concerns were addressed. Include Pregnancy/Lactation Warning?: No Erythromycin Pregnancy And Lactation Text: This medication is Pregnancy Category B and is considered safe during pregnancy. It is also excreted in breast milk. Spironolactone Counseling: Patient advised regarding risks of diarrhea, abdominal pain, hyperkalemia, birth defects (for female patients), liver toxicity and renal toxicity. The patient may need blood work to monitor liver and kidney function and potassium levels while on therapy. The patient verbalized understanding of the proper use and possible adverse effects of spironolactone.  All of the patient's questions and concerns were addressed. Azithromycin Pregnancy And Lactation Text: This medication is considered safe during pregnancy and is also secreted in breast milk. Azithromycin Counseling:  I discussed with the patient the risks of azithromycin including but not limited to GI upset, allergic reaction, drug rash, diarrhea, and yeast infections. Topical Retinoid counseling:  Patient advised to apply a pea-sized amount only at bedtime and wait 30 minutes after washing their face before applying.  If too drying, patient may add a non-comedogenic moisturizer. The patient verbalized understanding of the proper use and possible adverse effects of retinoids.  All of the patient's questions and concerns were addressed. Tazorac Counseling:  Patient advised that medication is irritating and drying.  Patient may need to apply sparingly and wash off after an hour before eventually leaving it on overnight.  The patient verbalized understanding of the proper use and possible adverse effects of tazorac.  All of the patient's questions and concerns were addressed. Bactrim Pregnancy And Lactation Text: This medication is Pregnancy Category D and is known to cause fetal risk.  It is also excreted in breast milk. High Dose Vitamin A Counseling: Side effects reviewed, pt to contact office should one occur. Dapsone Counseling: I discussed with the patient the risks of dapsone including but not limited to hemolytic anemia, agranulocytosis, rashes, methemoglobinemia, kidney failure, peripheral neuropathy, headaches, GI upset, and liver toxicity.  Patients who start dapsone require monitoring including baseline LFTs and weekly CBCs for the first month, then every month thereafter.  The patient verbalized understanding of the proper use and possible adverse effects of dapsone.  All of the patient's questions and concerns were addressed.

## 2022-09-29 NOTE — DIETITIAN INITIAL EVALUATION ADULT. - EST PROTEIN NEEDS7
70.68 [Follow-Up Visit] : a follow-up [FreeTextEntry2] : locally advanced, inflammatory breast cancer

## 2024-02-27 NOTE — PROVIDER CONTACT NOTE (OTHER) - BACKGROUND
given to family
Admitted with SOB, Pleural effusion, Pulmonary edema, anemia, RUE DVT, h/o stage 4 breast ca

## 2024-05-02 NOTE — ED ADULT NURSE NOTE - EXTENSIONS OF SELF_ADULT
[FreeTextEntry1] : 68-year-old female with a history of hypertension, hyperlipidemia, left bundle branch block, anxiety, hypothyroidism, Carrington's esophagus who presents for follow-up.  Since her last visit she has been doing well. She reports no chest pain, shortness of breath, lightheadedness, syncope, orthopnea, PND, lower extremity edema or waking. She does suffer from anxiety and nervousness and occasionally when she gets anxious she will feel her heart beating or racing. Prior Holter monitors have shown no significant arrhythmias. She does remain active on a regular basis and can go up two flights of stairs without any exertional symptoms of chest pain or shortness of breath. She is planning to undergo hiatal hernia surgery some time around August.  Labs 3/2024: , HDL 58, LDL 78. A1c 5.8 and creatinine WNL Echocardiogram 2023: Normal LV size and function with EF 60%. Mild MR. Holter 2023: No significant arrhythmia Nuclear stress test 2022: No evidence of ischemia or infarct. Abdominal ultrasound 2023: No abdominal aortic aneurysm Carotid Doppler 2023: minimal atherosclerosis
None